# Patient Record
Sex: MALE | Race: WHITE | Employment: OTHER | URBAN - METROPOLITAN AREA
[De-identification: names, ages, dates, MRNs, and addresses within clinical notes are randomized per-mention and may not be internally consistent; named-entity substitution may affect disease eponyms.]

---

## 2017-01-01 ENCOUNTER — ANESTHESIA (OUTPATIENT)
Dept: ENDOSCOPY | Facility: OTHER | Age: 77
DRG: 064 | End: 2017-01-01
Payer: MEDICARE

## 2017-01-01 ENCOUNTER — SURGERY (OUTPATIENT)
Age: 77
End: 2017-01-01

## 2017-01-01 ENCOUNTER — ANESTHESIA EVENT (OUTPATIENT)
Dept: ENDOSCOPY | Facility: OTHER | Age: 77
DRG: 064 | End: 2017-01-01
Payer: MEDICARE

## 2017-01-01 ENCOUNTER — HOSPITAL ENCOUNTER (INPATIENT)
Facility: OTHER | Age: 77
LOS: 11 days | Discharge: NURSING, REHAB OR LONG-TERM CARE | DRG: 064 | End: 2017-02-24
Attending: INTERNAL MEDICINE | Admitting: INTERNAL MEDICINE
Payer: MEDICARE

## 2017-01-01 ENCOUNTER — TELEPHONE (OUTPATIENT)
Dept: NEUROLOGY | Facility: CLINIC | Age: 77
End: 2017-01-01

## 2017-01-01 ENCOUNTER — PATIENT OUTREACH (OUTPATIENT)
Dept: ADMINISTRATIVE | Facility: CLINIC | Age: 77
End: 2017-01-01
Payer: MEDICARE

## 2017-01-01 ENCOUNTER — TELEMEDICINE (OUTPATIENT)
Dept: TELEMEDICINE | Facility: OTHER | Age: 77
End: 2017-01-01
Payer: MEDICARE

## 2017-01-01 VITALS
TEMPERATURE: 98 F | WEIGHT: 169.56 LBS | HEIGHT: 70 IN | SYSTOLIC BLOOD PRESSURE: 148 MMHG | HEART RATE: 73 BPM | BODY MASS INDEX: 24.27 KG/M2 | RESPIRATION RATE: 20 BRPM | DIASTOLIC BLOOD PRESSURE: 70 MMHG | OXYGEN SATURATION: 91 %

## 2017-01-01 DIAGNOSIS — R13.11 ORAL PHASE DYSPHAGIA: ICD-10-CM

## 2017-01-01 DIAGNOSIS — I10 ESSENTIAL HYPERTENSION: ICD-10-CM

## 2017-01-01 DIAGNOSIS — I63.412 EMBOLIC STROKE INVOLVING LEFT MIDDLE CEREBRAL ARTERY: ICD-10-CM

## 2017-01-01 DIAGNOSIS — R47.01 APHASIA: ICD-10-CM

## 2017-01-01 DIAGNOSIS — R45.1 AGITATION: ICD-10-CM

## 2017-01-01 DIAGNOSIS — R06.03 RESPIRATORY DISTRESS: Primary | ICD-10-CM

## 2017-01-01 DIAGNOSIS — G81.91 RIGHT HEMIPLEGIA: ICD-10-CM

## 2017-01-01 DIAGNOSIS — I63.512 ACUTE ISCHEMIC LEFT MCA STROKE: ICD-10-CM

## 2017-01-01 DIAGNOSIS — I63.9 CVA (CEREBRAL VASCULAR ACCIDENT): ICD-10-CM

## 2017-01-01 LAB
25(OH)D3+25(OH)D2 SERPL-MCNC: 16 NG/ML
ALBUMIN SERPL BCP-MCNC: 3.2 G/DL
ALBUMIN SERPL BCP-MCNC: 3.6 G/DL
ALBUMIN SERPL ELPH-MCNC: 2.64 G/DL
ALP SERPL-CCNC: 123 U/L
ALP SERPL-CCNC: 135 U/L
ALPHA1 GLOB SERPL ELPH-MCNC: 0.53 G/DL
ALPHA2 GLOB SERPL ELPH-MCNC: 1.51 G/DL
ALT SERPL W/O P-5'-P-CCNC: 7 U/L
ALT SERPL W/O P-5'-P-CCNC: 8 U/L
ANA SER QL IF: NORMAL
ANION GAP SERPL CALC-SCNC: 10 MMOL/L
ANION GAP SERPL CALC-SCNC: 11 MMOL/L
ANION GAP SERPL CALC-SCNC: 12 MMOL/L
ANION GAP SERPL CALC-SCNC: 13 MMOL/L
ANION GAP SERPL CALC-SCNC: 13 MMOL/L
ANION GAP SERPL CALC-SCNC: 14 MMOL/L
ANION GAP SERPL CALC-SCNC: 21 MMOL/L
ANISOCYTOSIS BLD QL SMEAR: SLIGHT
AST SERPL-CCNC: 8 U/L
AST SERPL-CCNC: 8 U/L
B-GLOBULIN SERPL ELPH-MCNC: 0.85 G/DL
BACTERIA #/AREA URNS HPF: ABNORMAL /HPF
BACTERIA UR CULT: NO GROWTH
BASO STIPL BLD QL SMEAR: ABNORMAL
BASOPHILS # BLD AUTO: 0.02 K/UL
BASOPHILS # BLD AUTO: 0.03 K/UL
BASOPHILS # BLD AUTO: 0.05 K/UL
BASOPHILS # BLD AUTO: ABNORMAL K/UL
BASOPHILS NFR BLD: 0.1 %
BASOPHILS NFR BLD: 0.1 %
BASOPHILS NFR BLD: 0.2 %
BASOPHILS NFR BLD: 0.3 %
BASOPHILS NFR BLD: 0.5 %
BASOPHILS NFR BLD: 1 %
BILIRUB SERPL-MCNC: 0.4 MG/DL
BILIRUB SERPL-MCNC: 0.5 MG/DL
BILIRUB UR QL STRIP: NEGATIVE
BUN SERPL-MCNC: 102 MG/DL
BUN SERPL-MCNC: 102 MG/DL
BUN SERPL-MCNC: 112 MG/DL
BUN SERPL-MCNC: 25 MG/DL
BUN SERPL-MCNC: 27 MG/DL
BUN SERPL-MCNC: 31 MG/DL
BUN SERPL-MCNC: 37 MG/DL
BUN SERPL-MCNC: 53 MG/DL
BUN SERPL-MCNC: 71 MG/DL
BUN SERPL-MCNC: 74 MG/DL
BUN SERPL-MCNC: 81 MG/DL
BUN SERPL-MCNC: 99 MG/DL
CALCIUM SERPL-MCNC: 8.3 MG/DL
CALCIUM SERPL-MCNC: 8.3 MG/DL
CALCIUM SERPL-MCNC: 8.4 MG/DL
CALCIUM SERPL-MCNC: 8.4 MG/DL
CALCIUM SERPL-MCNC: 8.5 MG/DL
CALCIUM SERPL-MCNC: 8.7 MG/DL
CALCIUM SERPL-MCNC: 8.8 MG/DL
CALCIUM SERPL-MCNC: 8.9 MG/DL
CALCIUM SERPL-MCNC: 9.3 MG/DL
CHLORIDE SERPL-SCNC: 102 MMOL/L
CHLORIDE SERPL-SCNC: 106 MMOL/L
CHLORIDE SERPL-SCNC: 107 MMOL/L
CHLORIDE SERPL-SCNC: 110 MMOL/L
CHLORIDE SERPL-SCNC: 111 MMOL/L
CHLORIDE SERPL-SCNC: 112 MMOL/L
CHLORIDE SERPL-SCNC: 114 MMOL/L
CHLORIDE SERPL-SCNC: 117 MMOL/L
CHOLEST/HDLC SERPL: 8.4 {RATIO}
CLARITY UR: CLEAR
CO2 SERPL-SCNC: 16 MMOL/L
CO2 SERPL-SCNC: 16 MMOL/L
CO2 SERPL-SCNC: 18 MMOL/L
CO2 SERPL-SCNC: 21 MMOL/L
CO2 SERPL-SCNC: 21 MMOL/L
CO2 SERPL-SCNC: 22 MMOL/L
CO2 SERPL-SCNC: 24 MMOL/L
CO2 SERPL-SCNC: 24 MMOL/L
CO2 SERPL-SCNC: 9 MMOL/L
COLOR UR: YELLOW
CREAT SERPL-MCNC: 2 MG/DL
CREAT SERPL-MCNC: 2.2 MG/DL
CREAT SERPL-MCNC: 2.2 MG/DL
CREAT SERPL-MCNC: 2.4 MG/DL
CREAT SERPL-MCNC: 3.1 MG/DL
CREAT SERPL-MCNC: 3.5 MG/DL
CREAT SERPL-MCNC: 3.6 MG/DL
CREAT SERPL-MCNC: 3.6 MG/DL
CREAT SERPL-MCNC: 4 MG/DL
CREAT SERPL-MCNC: 4.3 MG/DL
CREAT SERPL-MCNC: 4.3 MG/DL
CREAT SERPL-MCNC: 4.5 MG/DL
CREAT UR-MCNC: 28 MG/DL
CREAT UR-MCNC: 28 MG/DL
DIASTOLIC DYSFUNCTION: YES
DIFFERENTIAL METHOD: ABNORMAL
DOHLE BOD BLD QL SMEAR: PRESENT
EOSINOPHIL # BLD AUTO: 0.1 K/UL
EOSINOPHIL # BLD AUTO: 0.2 K/UL
EOSINOPHIL # BLD AUTO: 0.4 K/UL
EOSINOPHIL # BLD AUTO: 0.4 K/UL
EOSINOPHIL # BLD AUTO: 0.6 K/UL
EOSINOPHIL # BLD AUTO: 0.7 K/UL
EOSINOPHIL # BLD AUTO: 1 K/UL
EOSINOPHIL # BLD AUTO: 1.1 K/UL
EOSINOPHIL # BLD AUTO: ABNORMAL K/UL
EOSINOPHIL NFR BLD: 0.7 %
EOSINOPHIL NFR BLD: 0.8 %
EOSINOPHIL NFR BLD: 1.1 %
EOSINOPHIL NFR BLD: 1.2 %
EOSINOPHIL NFR BLD: 2.2 %
EOSINOPHIL NFR BLD: 3 %
EOSINOPHIL NFR BLD: 3 %
EOSINOPHIL NFR BLD: 3.1 %
EOSINOPHIL NFR BLD: 3.8 %
EOSINOPHIL NFR BLD: 5.4 %
EOSINOPHIL NFR BLD: 7.4 %
EOSINOPHIL NFR BLD: 9.5 %
EOSINOPHIL URNS QL WRIGHT STN: NORMAL
ERYTHROCYTE [DISTWIDTH] IN BLOOD BY AUTOMATED COUNT: 14.6 %
ERYTHROCYTE [DISTWIDTH] IN BLOOD BY AUTOMATED COUNT: 14.7 %
ERYTHROCYTE [DISTWIDTH] IN BLOOD BY AUTOMATED COUNT: 14.8 %
ERYTHROCYTE [DISTWIDTH] IN BLOOD BY AUTOMATED COUNT: 15 %
ERYTHROCYTE [DISTWIDTH] IN BLOOD BY AUTOMATED COUNT: 15 %
ERYTHROCYTE [DISTWIDTH] IN BLOOD BY AUTOMATED COUNT: 15.2 %
ERYTHROCYTE [DISTWIDTH] IN BLOOD BY AUTOMATED COUNT: 15.6 %
ERYTHROCYTE [DISTWIDTH] IN BLOOD BY AUTOMATED COUNT: 15.7 %
ERYTHROCYTE [DISTWIDTH] IN BLOOD BY AUTOMATED COUNT: 15.8 %
ERYTHROCYTE [DISTWIDTH] IN BLOOD BY AUTOMATED COUNT: 16.8 %
EST. GFR  (AFRICAN AMERICAN): 13 ML/MIN/1.73 M^2
EST. GFR  (AFRICAN AMERICAN): 14 ML/MIN/1.73 M^2
EST. GFR  (AFRICAN AMERICAN): 14 ML/MIN/1.73 M^2
EST. GFR  (AFRICAN AMERICAN): 15 ML/MIN/1.73 M^2
EST. GFR  (AFRICAN AMERICAN): 18 ML/MIN/1.73 M^2
EST. GFR  (AFRICAN AMERICAN): 21 ML/MIN/1.73 M^2
EST. GFR  (AFRICAN AMERICAN): 29 ML/MIN/1.73 M^2
EST. GFR  (AFRICAN AMERICAN): 32 ML/MIN/1.73 M^2
EST. GFR  (AFRICAN AMERICAN): 32 ML/MIN/1.73 M^2
EST. GFR  (AFRICAN AMERICAN): 36 ML/MIN/1.73 M^2
EST. GFR  (NON AFRICAN AMERICAN): 12 ML/MIN/1.73 M^2
EST. GFR  (NON AFRICAN AMERICAN): 13 ML/MIN/1.73 M^2
EST. GFR  (NON AFRICAN AMERICAN): 15 ML/MIN/1.73 M^2
EST. GFR  (NON AFRICAN AMERICAN): 15 ML/MIN/1.73 M^2
EST. GFR  (NON AFRICAN AMERICAN): 16 ML/MIN/1.73 M^2
EST. GFR  (NON AFRICAN AMERICAN): 18 ML/MIN/1.73 M^2
EST. GFR  (NON AFRICAN AMERICAN): 25 ML/MIN/1.73 M^2
EST. GFR  (NON AFRICAN AMERICAN): 27 ML/MIN/1.73 M^2
EST. GFR  (NON AFRICAN AMERICAN): 27 ML/MIN/1.73 M^2
EST. GFR  (NON AFRICAN AMERICAN): 31 ML/MIN/1.73 M^2
ESTIMATED AVG GLUCOSE: 232 MG/DL
ESTIMATED PA SYSTOLIC PRESSURE: 32.16
FERRITIN SERPL-MCNC: 78 NG/ML
GAMMA GLOB SERPL ELPH-MCNC: 0.68 G/DL
GIANT PLATELETS BLD QL SMEAR: PRESENT
GLOBAL PERICARDIAL EFFUSION: ABNORMAL
GLUCOSE SERPL-MCNC: 121 MG/DL
GLUCOSE SERPL-MCNC: 131 MG/DL
GLUCOSE SERPL-MCNC: 137 MG/DL
GLUCOSE SERPL-MCNC: 200 MG/DL
GLUCOSE SERPL-MCNC: 203 MG/DL
GLUCOSE SERPL-MCNC: 215 MG/DL
GLUCOSE SERPL-MCNC: 222 MG/DL
GLUCOSE SERPL-MCNC: 230 MG/DL
GLUCOSE SERPL-MCNC: 236 MG/DL
GLUCOSE SERPL-MCNC: 257 MG/DL
GLUCOSE SERPL-MCNC: 271 MG/DL
GLUCOSE SERPL-MCNC: 59 MG/DL
GLUCOSE UR QL STRIP: ABNORMAL
HAV IGM SERPL QL IA: NEGATIVE
HBA1C MFR BLD HPLC: 9.7 %
HBV CORE IGM SERPL QL IA: NEGATIVE
HBV SURFACE AG SERPL QL IA: NEGATIVE
HCT VFR BLD AUTO: 30.2 %
HCT VFR BLD AUTO: 30.6 %
HCT VFR BLD AUTO: 32 %
HCT VFR BLD AUTO: 32.4 %
HCT VFR BLD AUTO: 33.2 %
HCT VFR BLD AUTO: 33.5 %
HCT VFR BLD AUTO: 33.8 %
HCT VFR BLD AUTO: 33.9 %
HCT VFR BLD AUTO: 34.8 %
HCT VFR BLD AUTO: 35 %
HCT VFR BLD AUTO: 35.9 %
HCT VFR BLD AUTO: 36.5 %
HCV AB SERPL QL IA: NEGATIVE
HDL/CHOLESTEROL RATIO: 11.9 %
HDLC SERPL-MCNC: 236 MG/DL
HDLC SERPL-MCNC: 28 MG/DL
HGB BLD-MCNC: 10.1 G/DL
HGB BLD-MCNC: 10.3 G/DL
HGB BLD-MCNC: 10.4 G/DL
HGB BLD-MCNC: 10.5 G/DL
HGB BLD-MCNC: 10.5 G/DL
HGB BLD-MCNC: 10.9 G/DL
HGB BLD-MCNC: 11 G/DL
HGB BLD-MCNC: 11 G/DL
HGB BLD-MCNC: 11.3 G/DL
HGB BLD-MCNC: 9.2 G/DL
HGB BLD-MCNC: 9.4 G/DL
HGB BLD-MCNC: 9.8 G/DL
HGB UR QL STRIP: ABNORMAL
HIV 1+2 AB+HIV1 P24 AG SERPL QL IA: NEGATIVE
HYALINE CASTS #/AREA URNS LPF: 0 /LPF
HYPOCHROMIA BLD QL SMEAR: ABNORMAL
INR PPP: 1
INTERPRETATION SERPL IFE-IMP: NORMAL
INTERPRETATION UR IFE-IMP: NORMAL
IRON SERPL-MCNC: 14 UG/DL
KETONES UR QL STRIP: NEGATIVE
LACTATE SERPL-SCNC: 1.4 MMOL/L
LDLC SERPL CALC-MCNC: 128.6 MG/DL
LEUKOCYTE ESTERASE UR QL STRIP: NEGATIVE
LYMPHOCYTES # BLD AUTO: 1.5 K/UL
LYMPHOCYTES # BLD AUTO: 1.9 K/UL
LYMPHOCYTES # BLD AUTO: 1.9 K/UL
LYMPHOCYTES # BLD AUTO: 2 K/UL
LYMPHOCYTES # BLD AUTO: 2.1 K/UL
LYMPHOCYTES # BLD AUTO: 2.2 K/UL
LYMPHOCYTES # BLD AUTO: 2.2 K/UL
LYMPHOCYTES # BLD AUTO: 2.3 K/UL
LYMPHOCYTES # BLD AUTO: 2.4 K/UL
LYMPHOCYTES # BLD AUTO: 2.6 K/UL
LYMPHOCYTES # BLD AUTO: ABNORMAL K/UL
LYMPHOCYTES NFR BLD: 11.4 %
LYMPHOCYTES NFR BLD: 12.6 %
LYMPHOCYTES NFR BLD: 14.4 %
LYMPHOCYTES NFR BLD: 15.3 %
LYMPHOCYTES NFR BLD: 15.3 %
LYMPHOCYTES NFR BLD: 16.2 %
LYMPHOCYTES NFR BLD: 16.9 %
LYMPHOCYTES NFR BLD: 16.9 %
LYMPHOCYTES NFR BLD: 17.3 %
LYMPHOCYTES NFR BLD: 17.3 %
LYMPHOCYTES NFR BLD: 26.1 %
LYMPHOCYTES NFR BLD: 9 %
MAGNESIUM SERPL-MCNC: 1.7 MG/DL
MCH RBC QN AUTO: 18.7 PG
MCH RBC QN AUTO: 18.8 PG
MCH RBC QN AUTO: 18.8 PG
MCH RBC QN AUTO: 18.9 PG
MCH RBC QN AUTO: 19 PG
MCH RBC QN AUTO: 19.1 PG
MCH RBC QN AUTO: 19.3 PG
MCH RBC QN AUTO: 19.3 PG
MCH RBC QN AUTO: 19.4 PG
MCHC RBC AUTO-ENTMCNC: 30.5 %
MCHC RBC AUTO-ENTMCNC: 30.6 %
MCHC RBC AUTO-ENTMCNC: 30.6 %
MCHC RBC AUTO-ENTMCNC: 30.7 %
MCHC RBC AUTO-ENTMCNC: 31 %
MCHC RBC AUTO-ENTMCNC: 31.1 %
MCHC RBC AUTO-ENTMCNC: 31.1 %
MCHC RBC AUTO-ENTMCNC: 31.2 %
MCHC RBC AUTO-ENTMCNC: 31.6 %
MCV RBC AUTO: 60 FL
MCV RBC AUTO: 60 FL
MCV RBC AUTO: 61 FL
MCV RBC AUTO: 62 FL
MCV RBC AUTO: 63 FL
MCV RBC AUTO: 63 FL
MICROSCOPIC COMMENT: ABNORMAL
MONOCYTES # BLD AUTO: 0.7 K/UL
MONOCYTES # BLD AUTO: 0.9 K/UL
MONOCYTES # BLD AUTO: 1 K/UL
MONOCYTES # BLD AUTO: 1.2 K/UL
MONOCYTES # BLD AUTO: 1.2 K/UL
MONOCYTES # BLD AUTO: 1.3 K/UL
MONOCYTES # BLD AUTO: 1.4 K/UL
MONOCYTES # BLD AUTO: 1.4 K/UL
MONOCYTES # BLD AUTO: 1.5 K/UL
MONOCYTES # BLD AUTO: 1.7 K/UL
MONOCYTES # BLD AUTO: ABNORMAL K/UL
MONOCYTES NFR BLD: 10.3 %
MONOCYTES NFR BLD: 10.9 %
MONOCYTES NFR BLD: 11.1 %
MONOCYTES NFR BLD: 12.4 %
MONOCYTES NFR BLD: 14.2 %
MONOCYTES NFR BLD: 15 %
MONOCYTES NFR BLD: 6.9 %
MONOCYTES NFR BLD: 7.1 %
MONOCYTES NFR BLD: 8 %
MONOCYTES NFR BLD: 8.2 %
MONOCYTES NFR BLD: 8.6 %
MONOCYTES NFR BLD: 9.6 %
NEUTROPHILS # BLD AUTO: 11.1 K/UL
NEUTROPHILS # BLD AUTO: 13.3 K/UL
NEUTROPHILS # BLD AUTO: 5.6 K/UL
NEUTROPHILS # BLD AUTO: 8.4 K/UL
NEUTROPHILS # BLD AUTO: 8.4 K/UL
NEUTROPHILS # BLD AUTO: 8.7 K/UL
NEUTROPHILS # BLD AUTO: 9 K/UL
NEUTROPHILS # BLD AUTO: 9.1 K/UL
NEUTROPHILS # BLD AUTO: 9.2 K/UL
NEUTROPHILS # BLD AUTO: 9.4 K/UL
NEUTROPHILS NFR BLD: 56.8 %
NEUTROPHILS NFR BLD: 64.8 %
NEUTROPHILS NFR BLD: 66.6 %
NEUTROPHILS NFR BLD: 68.2 %
NEUTROPHILS NFR BLD: 68.5 %
NEUTROPHILS NFR BLD: 68.8 %
NEUTROPHILS NFR BLD: 71 %
NEUTROPHILS NFR BLD: 71.4 %
NEUTROPHILS NFR BLD: 73.4 %
NEUTROPHILS NFR BLD: 75.2 %
NEUTROPHILS NFR BLD: 78.4 %
NEUTROPHILS NFR BLD: 80.3 %
NEUTS BAND NFR BLD MANUAL: 1 %
NITRITE UR QL STRIP: NEGATIVE
NONHDLC SERPL-MCNC: 208 MG/DL
NRBC BLD-RTO: 0 /100 WBC
NRBC BLD-RTO: 0 /100 WBC
OB PNL STL: NEGATIVE
OVALOCYTES BLD QL SMEAR: ABNORMAL
PATHOLOGIST INTERPRETATION IFE: NORMAL
PATHOLOGIST INTERPRETATION SPE: NORMAL
PATHOLOGIST INTERPRETATION UIFE: NORMAL
PATHOLOGIST INTERPRETATION UPE: NORMAL
PH UR STRIP: 5 [PH] (ref 5–8)
PHOSPHATE SERPL-MCNC: 2.8 MG/DL
PLATELET # BLD AUTO: 244 K/UL
PLATELET # BLD AUTO: 255 K/UL
PLATELET # BLD AUTO: 257 K/UL
PLATELET # BLD AUTO: 264 K/UL
PLATELET # BLD AUTO: 272 K/UL
PLATELET # BLD AUTO: 282 K/UL
PLATELET # BLD AUTO: 287 K/UL
PLATELET # BLD AUTO: 298 K/UL
PLATELET # BLD AUTO: 300 K/UL
PLATELET # BLD AUTO: 334 K/UL
PLATELET # BLD AUTO: 389 K/UL
PLATELET # BLD AUTO: 389 K/UL
PLATELET BLD QL SMEAR: ABNORMAL
PMV BLD AUTO: 11 FL
PMV BLD AUTO: 11.3 FL
PMV BLD AUTO: 11.6 FL
PMV BLD AUTO: ABNORMAL FL
POCT GLUCOSE: 109 MG/DL (ref 70–110)
POCT GLUCOSE: 117 MG/DL (ref 70–110)
POCT GLUCOSE: 120 MG/DL (ref 70–110)
POCT GLUCOSE: 121 MG/DL (ref 70–110)
POCT GLUCOSE: 133 MG/DL (ref 70–110)
POCT GLUCOSE: 136 MG/DL (ref 70–110)
POCT GLUCOSE: 141 MG/DL (ref 70–110)
POCT GLUCOSE: 152 MG/DL (ref 70–110)
POCT GLUCOSE: 166 MG/DL (ref 70–110)
POCT GLUCOSE: 166 MG/DL (ref 70–110)
POCT GLUCOSE: 171 MG/DL (ref 70–110)
POCT GLUCOSE: 173 MG/DL (ref 70–110)
POCT GLUCOSE: 181 MG/DL (ref 70–110)
POCT GLUCOSE: 182 MG/DL (ref 70–110)
POCT GLUCOSE: 182 MG/DL (ref 70–110)
POCT GLUCOSE: 187 MG/DL (ref 70–110)
POCT GLUCOSE: 194 MG/DL (ref 70–110)
POCT GLUCOSE: 203 MG/DL (ref 70–110)
POCT GLUCOSE: 203 MG/DL (ref 70–110)
POCT GLUCOSE: 204 MG/DL (ref 70–110)
POCT GLUCOSE: 207 MG/DL (ref 70–110)
POCT GLUCOSE: 219 MG/DL (ref 70–110)
POCT GLUCOSE: 219 MG/DL (ref 70–110)
POCT GLUCOSE: 220 MG/DL (ref 70–110)
POCT GLUCOSE: 221 MG/DL (ref 70–110)
POCT GLUCOSE: 223 MG/DL (ref 70–110)
POCT GLUCOSE: 227 MG/DL (ref 70–110)
POCT GLUCOSE: 229 MG/DL (ref 70–110)
POCT GLUCOSE: 232 MG/DL (ref 70–110)
POCT GLUCOSE: 239 MG/DL (ref 70–110)
POCT GLUCOSE: 240 MG/DL (ref 70–110)
POCT GLUCOSE: 240 MG/DL (ref 70–110)
POCT GLUCOSE: 241 MG/DL (ref 70–110)
POCT GLUCOSE: 246 MG/DL (ref 70–110)
POCT GLUCOSE: 250 MG/DL (ref 70–110)
POCT GLUCOSE: 252 MG/DL (ref 70–110)
POCT GLUCOSE: 259 MG/DL (ref 70–110)
POCT GLUCOSE: 262 MG/DL (ref 70–110)
POCT GLUCOSE: 271 MG/DL (ref 70–110)
POCT GLUCOSE: 274 MG/DL (ref 70–110)
POCT GLUCOSE: 274 MG/DL (ref 70–110)
POCT GLUCOSE: 282 MG/DL (ref 70–110)
POCT GLUCOSE: 292 MG/DL (ref 70–110)
POCT GLUCOSE: 302 MG/DL (ref 70–110)
POCT GLUCOSE: 315 MG/DL (ref 70–110)
POCT GLUCOSE: 339 MG/DL (ref 70–110)
POCT GLUCOSE: 342 MG/DL (ref 70–110)
POCT GLUCOSE: 355 MG/DL (ref 70–110)
POCT GLUCOSE: 361 MG/DL (ref 70–110)
POCT GLUCOSE: 366 MG/DL (ref 70–110)
POCT GLUCOSE: 444 MG/DL (ref 70–110)
POCT GLUCOSE: 67 MG/DL (ref 70–110)
POCT GLUCOSE: 94 MG/DL (ref 70–110)
POIKILOCYTOSIS BLD QL SMEAR: SLIGHT
POLYCHROMASIA BLD QL SMEAR: ABNORMAL
POTASSIUM SERPL-SCNC: 3.9 MMOL/L
POTASSIUM SERPL-SCNC: 4 MMOL/L
POTASSIUM SERPL-SCNC: 4.1 MMOL/L
POTASSIUM SERPL-SCNC: 4.1 MMOL/L
POTASSIUM SERPL-SCNC: 4.3 MMOL/L
POTASSIUM SERPL-SCNC: 4.4 MMOL/L
POTASSIUM SERPL-SCNC: 4.4 MMOL/L
POTASSIUM SERPL-SCNC: 4.5 MMOL/L
POTASSIUM SERPL-SCNC: 4.5 MMOL/L
POTASSIUM SERPL-SCNC: 4.8 MMOL/L
PROT 24H UR-MRATE: 570 MG/SPEC
PROT PATTERN UR ELPH-IMP: NORMAL
PROT SERPL-MCNC: 6.2 G/DL
PROT SERPL-MCNC: 6.3 G/DL
PROT SERPL-MCNC: 7 G/DL
PROT UR QL STRIP: ABNORMAL
PROT UR-MCNC: 40 MG/DL
PROT UR-MCNC: 95 MG/DL
PROT/CREAT RATIO, UR: 1.43
PROTHROMBIN TIME: 11.3 SEC
PTH-INTACT SERPL-MCNC: 198.1 PG/ML
RBC # BLD AUTO: 4.88 M/UL
RBC # BLD AUTO: 5.01 M/UL
RBC # BLD AUTO: 5.18 M/UL
RBC # BLD AUTO: 5.36 M/UL
RBC # BLD AUTO: 5.47 M/UL
RBC # BLD AUTO: 5.49 M/UL
RBC # BLD AUTO: 5.5 M/UL
RBC # BLD AUTO: 5.56 M/UL
RBC # BLD AUTO: 5.71 M/UL
RBC # BLD AUTO: 5.71 M/UL
RBC # BLD AUTO: 5.77 M/UL
RBC # BLD AUTO: 5.82 M/UL
RBC #/AREA URNS HPF: 75 /HPF (ref 0–4)
RETIRED EF AND QEF - SEE NOTES: 60 (ref 55–65)
RPR SER QL: NORMAL
SATURATED IRON: 5 %
SCHISTOCYTES BLD QL SMEAR: ABNORMAL
SODIUM SERPL-SCNC: 138 MMOL/L
SODIUM SERPL-SCNC: 140 MMOL/L
SODIUM SERPL-SCNC: 141 MMOL/L
SODIUM SERPL-SCNC: 141 MMOL/L
SODIUM SERPL-SCNC: 142 MMOL/L
SODIUM SERPL-SCNC: 147 MMOL/L
SODIUM SERPL-SCNC: 149 MMOL/L
SODIUM UR-SCNC: 94 MMOL/L
SP GR UR STRIP: 1.01 (ref 1–1.03)
T4 FREE SERPL-MCNC: 0.87 NG/DL
T4 FREE SERPL-MCNC: 0.92 NG/DL
TARGETS BLD QL SMEAR: ABNORMAL
TOTAL IRON BINDING CAPACITY: 258 UG/DL
TRANSFERRIN SERPL-MCNC: 174 MG/DL
TRICUSPID VALVE REGURGITATION: ABNORMAL
TRIGL SERPL-MCNC: 397 MG/DL
TSH SERPL DL<=0.005 MIU/L-ACNC: 4.49 UIU/ML
TSH SERPL DL<=0.005 MIU/L-ACNC: 5.01 UIU/ML
URATE SERPL-MCNC: 10.3 MG/DL
URINE COLLECTION DURATION: 24 HR
URINE VOLUME: 600 ML
URN SPEC COLLECT METH UR: ABNORMAL
UROBILINOGEN UR STRIP-ACNC: NEGATIVE EU/DL
WBC # BLD AUTO: 11.78 K/UL
WBC # BLD AUTO: 11.8 K/UL
WBC # BLD AUTO: 11.82 K/UL
WBC # BLD AUTO: 12.12 K/UL
WBC # BLD AUTO: 12.35 K/UL
WBC # BLD AUTO: 12.82 K/UL
WBC # BLD AUTO: 13.22 K/UL
WBC # BLD AUTO: 13.62 K/UL
WBC # BLD AUTO: 14.27 K/UL
WBC # BLD AUTO: 15.31 K/UL
WBC # BLD AUTO: 16.63 K/UL
WBC # BLD AUTO: 9.96 K/UL
WBC #/AREA URNS HPF: 1 /HPF (ref 0–5)
WBC NRBC COR # BLD: 11.82 K/UL

## 2017-01-01 PROCEDURE — 25000242 PHARM REV CODE 250 ALT 637 W/ HCPCS: Performed by: FAMILY MEDICINE

## 2017-01-01 PROCEDURE — 63600175 PHARM REV CODE 636 W HCPCS: Performed by: INTERNAL MEDICINE

## 2017-01-01 PROCEDURE — 97112 NEUROMUSCULAR REEDUCATION: CPT

## 2017-01-01 PROCEDURE — 27000221 HC OXYGEN, UP TO 24 HOURS

## 2017-01-01 PROCEDURE — 25000003 PHARM REV CODE 250: Performed by: INTERNAL MEDICINE

## 2017-01-01 PROCEDURE — 97530 THERAPEUTIC ACTIVITIES: CPT

## 2017-01-01 PROCEDURE — 84550 ASSAY OF BLOOD/URIC ACID: CPT

## 2017-01-01 PROCEDURE — 87086 URINE CULTURE/COLONY COUNT: CPT

## 2017-01-01 PROCEDURE — 25000003 PHARM REV CODE 250: Performed by: HOSPITALIST

## 2017-01-01 PROCEDURE — 94640 AIRWAY INHALATION TREATMENT: CPT

## 2017-01-01 PROCEDURE — 84443 ASSAY THYROID STIM HORMONE: CPT

## 2017-01-01 PROCEDURE — 90471 IMMUNIZATION ADMIN: CPT | Performed by: HOSPITALIST

## 2017-01-01 PROCEDURE — 27000190 HC CPAP FULL FACE MASK W/VALVE

## 2017-01-01 PROCEDURE — 86334 IMMUNOFIX E-PHORESIS SERUM: CPT | Mod: 26,,, | Performed by: PATHOLOGY

## 2017-01-01 PROCEDURE — 11000001 HC ACUTE MED/SURG PRIVATE ROOM

## 2017-01-01 PROCEDURE — 86703 HIV-1/HIV-2 1 RESULT ANTBDY: CPT

## 2017-01-01 PROCEDURE — G0427 INPT/ED TELECONSULT70: HCPCS | Mod: GT,,, | Performed by: PSYCHIATRY & NEUROLOGY

## 2017-01-01 PROCEDURE — 93005 ELECTROCARDIOGRAM TRACING: CPT

## 2017-01-01 PROCEDURE — 43246 EGD PLACE GASTROSTOMY TUBE: CPT | Performed by: INTERNAL MEDICINE

## 2017-01-01 PROCEDURE — 99900035 HC TECH TIME PER 15 MIN (STAT)

## 2017-01-01 PROCEDURE — 97110 THERAPEUTIC EXERCISES: CPT

## 2017-01-01 PROCEDURE — 92526 ORAL FUNCTION THERAPY: CPT

## 2017-01-01 PROCEDURE — 93017 CV STRESS TEST TRACING ONLY: CPT

## 2017-01-01 PROCEDURE — 27201018 HC KIT, PEG (ANY): Performed by: INTERNAL MEDICINE

## 2017-01-01 PROCEDURE — 85025 COMPLETE CBC W/AUTO DIFF WBC: CPT

## 2017-01-01 PROCEDURE — G0008 ADMIN INFLUENZA VIRUS VAC: HCPCS | Performed by: HOSPITALIST

## 2017-01-01 PROCEDURE — 80048 BASIC METABOLIC PNL TOTAL CA: CPT

## 2017-01-01 PROCEDURE — 94660 CPAP INITIATION&MGMT: CPT

## 2017-01-01 PROCEDURE — 36415 COLL VENOUS BLD VENIPUNCTURE: CPT

## 2017-01-01 PROCEDURE — 99233 SBSQ HOSP IP/OBS HIGH 50: CPT | Mod: ,,, | Performed by: PSYCHIATRY & NEUROLOGY

## 2017-01-01 PROCEDURE — 83540 ASSAY OF IRON: CPT

## 2017-01-01 PROCEDURE — 97535 SELF CARE MNGMENT TRAINING: CPT

## 2017-01-01 PROCEDURE — 93010 ELECTROCARDIOGRAM REPORT: CPT | Mod: ,,, | Performed by: INTERNAL MEDICINE

## 2017-01-01 PROCEDURE — 85007 BL SMEAR W/DIFF WBC COUNT: CPT

## 2017-01-01 PROCEDURE — 99233 SBSQ HOSP IP/OBS HIGH 50: CPT | Mod: ,,, | Performed by: HOSPITALIST

## 2017-01-01 PROCEDURE — 99223 1ST HOSP IP/OBS HIGH 75: CPT | Mod: ,,, | Performed by: PSYCHIATRY & NEUROLOGY

## 2017-01-01 PROCEDURE — 84165 PROTEIN E-PHORESIS SERUM: CPT

## 2017-01-01 PROCEDURE — 3E0234Z INTRODUCTION OF SERUM, TOXOID AND VACCINE INTO MUSCLE, PERCUTANEOUS APPROACH: ICD-10-PCS | Performed by: INTERNAL MEDICINE

## 2017-01-01 PROCEDURE — 92507 TX SP LANG VOICE COMM INDIV: CPT

## 2017-01-01 PROCEDURE — 37000009 HC ANESTHESIA EA ADD 15 MINS: Performed by: INTERNAL MEDICINE

## 2017-01-01 PROCEDURE — 63600175 PHARM REV CODE 636 W HCPCS: Performed by: HOSPITALIST

## 2017-01-01 PROCEDURE — 84166 PROTEIN E-PHORESIS/URINE/CSF: CPT | Mod: 26,,, | Performed by: PATHOLOGY

## 2017-01-01 PROCEDURE — 20000000 HC ICU ROOM

## 2017-01-01 PROCEDURE — 84156 ASSAY OF PROTEIN URINE: CPT

## 2017-01-01 PROCEDURE — 25000003 PHARM REV CODE 250: Performed by: NURSE PRACTITIONER

## 2017-01-01 PROCEDURE — 82728 ASSAY OF FERRITIN: CPT

## 2017-01-01 PROCEDURE — 97802 MEDICAL NUTRITION INDIV IN: CPT

## 2017-01-01 PROCEDURE — 87205 SMEAR GRAM STAIN: CPT

## 2017-01-01 PROCEDURE — 37000008 HC ANESTHESIA 1ST 15 MINUTES: Performed by: INTERNAL MEDICINE

## 2017-01-01 PROCEDURE — 80053 COMPREHEN METABOLIC PANEL: CPT

## 2017-01-01 PROCEDURE — 83605 ASSAY OF LACTIC ACID: CPT

## 2017-01-01 PROCEDURE — 80074 ACUTE HEPATITIS PANEL: CPT

## 2017-01-01 PROCEDURE — 0DH63UZ INSERTION OF FEEDING DEVICE INTO STOMACH, PERCUTANEOUS APPROACH: ICD-10-PCS | Performed by: INTERNAL MEDICINE

## 2017-01-01 PROCEDURE — 93306 TTE W/DOPPLER COMPLETE: CPT

## 2017-01-01 PROCEDURE — 63600175 PHARM REV CODE 636 W HCPCS: Performed by: FAMILY MEDICINE

## 2017-01-01 PROCEDURE — 86335 IMMUNFIX E-PHORSIS/URINE/CSF: CPT

## 2017-01-01 PROCEDURE — 97803 MED NUTRITION INDIV SUBSEQ: CPT

## 2017-01-01 PROCEDURE — 92523 SPEECH SOUND LANG COMPREHEN: CPT

## 2017-01-01 PROCEDURE — 31720 CLEARANCE OF AIRWAYS: CPT

## 2017-01-01 PROCEDURE — 86334 IMMUNOFIX E-PHORESIS SERUM: CPT

## 2017-01-01 PROCEDURE — 63600175 PHARM REV CODE 636 W HCPCS: Performed by: NURSE ANESTHETIST, CERTIFIED REGISTERED

## 2017-01-01 PROCEDURE — 83735 ASSAY OF MAGNESIUM: CPT

## 2017-01-01 PROCEDURE — 85610 PROTHROMBIN TIME: CPT

## 2017-01-01 PROCEDURE — 97532 *HC OT COG SKL DEV EA 15: CPT

## 2017-01-01 PROCEDURE — 63600175 PHARM REV CODE 636 W HCPCS: Performed by: ANESTHESIOLOGY

## 2017-01-01 PROCEDURE — 85027 COMPLETE CBC AUTOMATED: CPT

## 2017-01-01 PROCEDURE — 99232 SBSQ HOSP IP/OBS MODERATE 35: CPT | Mod: ,,, | Performed by: INTERNAL MEDICINE

## 2017-01-01 PROCEDURE — 97162 PT EVAL MOD COMPLEX 30 MIN: CPT

## 2017-01-01 PROCEDURE — 84166 PROTEIN E-PHORESIS/URINE/CSF: CPT

## 2017-01-01 PROCEDURE — 97166 OT EVAL MOD COMPLEX 45 MIN: CPT

## 2017-01-01 PROCEDURE — 86335 IMMUNFIX E-PHORSIS/URINE/CSF: CPT | Mod: 26,,, | Performed by: PATHOLOGY

## 2017-01-01 PROCEDURE — 99233 SBSQ HOSP IP/OBS HIGH 50: CPT | Mod: ,,, | Performed by: INTERNAL MEDICINE

## 2017-01-01 PROCEDURE — 82272 OCCULT BLD FECES 1-3 TESTS: CPT

## 2017-01-01 PROCEDURE — 84165 PROTEIN E-PHORESIS SERUM: CPT | Mod: 26,,, | Performed by: PATHOLOGY

## 2017-01-01 PROCEDURE — 99223 1ST HOSP IP/OBS HIGH 75: CPT | Mod: ,,, | Performed by: INTERNAL MEDICINE

## 2017-01-01 PROCEDURE — 84300 ASSAY OF URINE SODIUM: CPT

## 2017-01-01 PROCEDURE — 81000 URINALYSIS NONAUTO W/SCOPE: CPT

## 2017-01-01 PROCEDURE — 90662 IIV NO PRSV INCREASED AG IM: CPT | Performed by: HOSPITALIST

## 2017-01-01 PROCEDURE — 25000003 PHARM REV CODE 250: Performed by: NURSE ANESTHETIST, CERTIFIED REGISTERED

## 2017-01-01 PROCEDURE — 82306 VITAMIN D 25 HYDROXY: CPT

## 2017-01-01 PROCEDURE — 86592 SYPHILIS TEST NON-TREP QUAL: CPT

## 2017-01-01 PROCEDURE — 25000242 PHARM REV CODE 250 ALT 637 W/ HCPCS: Performed by: HOSPITALIST

## 2017-01-01 PROCEDURE — 84439 ASSAY OF FREE THYROXINE: CPT

## 2017-01-01 PROCEDURE — 92610 EVALUATE SWALLOWING FUNCTION: CPT

## 2017-01-01 PROCEDURE — 0DJ08ZZ INSPECTION OF UPPER INTESTINAL TRACT, VIA NATURAL OR ARTIFICIAL OPENING ENDOSCOPIC: ICD-10-PCS | Performed by: INTERNAL MEDICINE

## 2017-01-01 PROCEDURE — 86038 ANTINUCLEAR ANTIBODIES: CPT

## 2017-01-01 PROCEDURE — 83036 HEMOGLOBIN GLYCOSYLATED A1C: CPT

## 2017-01-01 PROCEDURE — 84100 ASSAY OF PHOSPHORUS: CPT

## 2017-01-01 PROCEDURE — 80061 LIPID PANEL: CPT

## 2017-01-01 PROCEDURE — 83970 ASSAY OF PARATHORMONE: CPT

## 2017-01-01 RX ORDER — FUROSEMIDE 10 MG/ML
40 INJECTION INTRAMUSCULAR; INTRAVENOUS DAILY
Status: DISCONTINUED | OUTPATIENT
Start: 2017-01-01 | End: 2017-01-01

## 2017-01-01 RX ORDER — ALLOPURINOL 100 MG/1
200 TABLET ORAL DAILY
Status: DISCONTINUED | OUTPATIENT
Start: 2017-01-01 | End: 2017-01-01

## 2017-01-01 RX ORDER — FENTANYL CITRATE 50 UG/ML
25 INJECTION, SOLUTION INTRAMUSCULAR; INTRAVENOUS EVERY 5 MIN PRN
Status: DISCONTINUED | OUTPATIENT
Start: 2017-01-01 | End: 2017-01-01 | Stop reason: HOSPADM

## 2017-01-01 RX ORDER — AMIODARONE HYDROCHLORIDE 200 MG/1
200 TABLET ORAL
Status: DISCONTINUED | OUTPATIENT
Start: 2017-01-01 | End: 2017-01-01 | Stop reason: HOSPADM

## 2017-01-01 RX ORDER — AMIODARONE HYDROCHLORIDE 200 MG/1
200 TABLET ORAL DAILY
Qty: 60 TABLET | Refills: 1 | Status: SHIPPED | OUTPATIENT
Start: 2017-01-01 | End: 2018-02-24

## 2017-01-01 RX ORDER — INSULIN ASPART 100 [IU]/ML
1-10 INJECTION, SOLUTION INTRAVENOUS; SUBCUTANEOUS EVERY 6 HOURS PRN
Status: DISCONTINUED | OUTPATIENT
Start: 2017-01-01 | End: 2017-01-01 | Stop reason: HOSPADM

## 2017-01-01 RX ORDER — ONDANSETRON 2 MG/ML
4 INJECTION INTRAMUSCULAR; INTRAVENOUS ONCE AS NEEDED
Status: ACTIVE | OUTPATIENT
Start: 2017-01-01 | End: 2017-01-01

## 2017-01-01 RX ORDER — METOPROLOL TARTRATE 25 MG/1
25 TABLET, FILM COATED ORAL 2 TIMES DAILY
Status: DISCONTINUED | OUTPATIENT
Start: 2017-01-01 | End: 2017-01-01

## 2017-01-01 RX ORDER — METOPROLOL TARTRATE 50 MG/1
50 TABLET ORAL 2 TIMES DAILY
Status: DISCONTINUED | OUTPATIENT
Start: 2017-01-01 | End: 2017-01-01 | Stop reason: HOSPADM

## 2017-01-01 RX ORDER — HYDRALAZINE HYDROCHLORIDE 25 MG/1
25 TABLET, FILM COATED ORAL EVERY 8 HOURS
Status: DISCONTINUED | OUTPATIENT
Start: 2017-01-01 | End: 2017-01-01

## 2017-01-01 RX ORDER — ATORVASTATIN CALCIUM 20 MG/1
40 TABLET, FILM COATED ORAL DAILY
Status: DISCONTINUED | OUTPATIENT
Start: 2017-01-01 | End: 2017-01-01 | Stop reason: HOSPADM

## 2017-01-01 RX ORDER — CHOLECALCIFEROL (VITAMIN D3) 25 MCG
2000 TABLET ORAL DAILY
Status: DISCONTINUED | OUTPATIENT
Start: 2017-01-01 | End: 2017-01-01 | Stop reason: HOSPADM

## 2017-01-01 RX ORDER — HEPARIN SODIUM 5000 [USP'U]/ML
5000 INJECTION, SOLUTION INTRAVENOUS; SUBCUTANEOUS EVERY 8 HOURS
Status: DISCONTINUED | OUTPATIENT
Start: 2017-01-01 | End: 2017-01-01

## 2017-01-01 RX ORDER — HYDRALAZINE HYDROCHLORIDE 20 MG/ML
20 INJECTION INTRAMUSCULAR; INTRAVENOUS ONCE
Status: COMPLETED | OUTPATIENT
Start: 2017-01-01 | End: 2017-01-01

## 2017-01-01 RX ORDER — ASPIRIN 81 MG/1
81 TABLET ORAL DAILY
Refills: 0 | COMMUNITY
Start: 2017-01-01 | End: 2018-02-24

## 2017-01-01 RX ORDER — NIFEDIPINE 60 MG/1
60 TABLET, EXTENDED RELEASE ORAL DAILY
Qty: 30 TABLET | Refills: 0 | Status: SHIPPED | OUTPATIENT
Start: 2017-01-01 | End: 2018-02-24

## 2017-01-01 RX ORDER — SODIUM CHLORIDE 0.9 % (FLUSH) 0.9 %
3 SYRINGE (ML) INJECTION
Status: DISCONTINUED | OUTPATIENT
Start: 2017-01-01 | End: 2017-01-01 | Stop reason: HOSPADM

## 2017-01-01 RX ORDER — SIMVASTATIN 40 MG/1
40 TABLET, FILM COATED ORAL NIGHTLY
Status: ON HOLD | COMMUNITY
End: 2017-01-01 | Stop reason: HOSPADM

## 2017-01-01 RX ORDER — SODIUM CHLORIDE 9 MG/ML
INJECTION, SOLUTION INTRAVENOUS CONTINUOUS
Status: DISCONTINUED | OUTPATIENT
Start: 2017-01-01 | End: 2017-01-01

## 2017-01-01 RX ORDER — SODIUM CHLORIDE, SODIUM LACTATE, POTASSIUM CHLORIDE, CALCIUM CHLORIDE 600; 310; 30; 20 MG/100ML; MG/100ML; MG/100ML; MG/100ML
INJECTION, SOLUTION INTRAVENOUS CONTINUOUS PRN
Status: DISCONTINUED | OUTPATIENT
Start: 2017-01-01 | End: 2017-01-01

## 2017-01-01 RX ORDER — SODIUM CHLORIDE 450 MG/100ML
INJECTION, SOLUTION INTRAVENOUS CONTINUOUS
Status: DISCONTINUED | OUTPATIENT
Start: 2017-01-01 | End: 2017-01-01

## 2017-01-01 RX ORDER — GLUCAGON 1 MG
1 KIT INJECTION
Status: DISCONTINUED | OUTPATIENT
Start: 2017-01-01 | End: 2017-01-01 | Stop reason: HOSPADM

## 2017-01-01 RX ORDER — GABAPENTIN 300 MG/1
300 CAPSULE ORAL 3 TIMES DAILY
COMMUNITY

## 2017-01-01 RX ORDER — METOPROLOL TARTRATE 50 MG/1
50 TABLET ORAL 2 TIMES DAILY
Qty: 60 TABLET | Refills: 1 | Status: SHIPPED | OUTPATIENT
Start: 2017-01-01 | End: 2018-02-24

## 2017-01-01 RX ORDER — METOPROLOL TARTRATE 50 MG/1
50 TABLET ORAL 3 TIMES DAILY
Status: DISCONTINUED | OUTPATIENT
Start: 2017-01-01 | End: 2017-01-01

## 2017-01-01 RX ORDER — ATORVASTATIN CALCIUM 40 MG/1
40 TABLET, FILM COATED ORAL DAILY
Qty: 30 TABLET | Refills: 1 | Status: SHIPPED | OUTPATIENT
Start: 2017-01-01 | End: 2018-02-24

## 2017-01-01 RX ORDER — IPRATROPIUM BROMIDE AND ALBUTEROL SULFATE 2.5; .5 MG/3ML; MG/3ML
3 SOLUTION RESPIRATORY (INHALATION) EVERY 6 HOURS
Status: DISCONTINUED | OUTPATIENT
Start: 2017-01-01 | End: 2017-01-01 | Stop reason: HOSPADM

## 2017-01-01 RX ORDER — OXYCODONE HYDROCHLORIDE 5 MG/1
5 TABLET ORAL
Status: DISCONTINUED | OUTPATIENT
Start: 2017-01-01 | End: 2017-01-01 | Stop reason: HOSPADM

## 2017-01-01 RX ORDER — CALCITRIOL 0.25 UG/1
0.25 CAPSULE ORAL DAILY
Status: DISCONTINUED | OUTPATIENT
Start: 2017-01-01 | End: 2017-01-01 | Stop reason: HOSPADM

## 2017-01-01 RX ORDER — ACETAMINOPHEN 650 MG/1
650 SUPPOSITORY RECTAL EVERY 6 HOURS PRN
Status: DISCONTINUED | OUTPATIENT
Start: 2017-01-01 | End: 2017-01-01 | Stop reason: HOSPADM

## 2017-01-01 RX ORDER — FEBUXOSTAT 40 MG/1
40 TABLET, FILM COATED ORAL DAILY
Status: DISCONTINUED | OUTPATIENT
Start: 2017-01-01 | End: 2017-01-01 | Stop reason: HOSPADM

## 2017-01-01 RX ORDER — HYDRALAZINE HYDROCHLORIDE 20 MG/ML
20 INJECTION INTRAMUSCULAR; INTRAVENOUS ONCE
Status: DISCONTINUED | OUTPATIENT
Start: 2017-01-01 | End: 2017-01-01

## 2017-01-01 RX ORDER — ASPIRIN 81 MG/1
81 TABLET ORAL DAILY
Status: DISCONTINUED | OUTPATIENT
Start: 2017-01-01 | End: 2017-01-01

## 2017-01-01 RX ORDER — INSULIN GLARGINE 100 [IU]/ML
25 INJECTION, SOLUTION SUBCUTANEOUS NIGHTLY
Qty: 7.5 ML | Refills: 11 | Status: SHIPPED | OUTPATIENT
Start: 2017-01-01 | End: 2018-02-24

## 2017-01-01 RX ORDER — FAMOTIDINE 10 MG/ML
20 INJECTION INTRAVENOUS 2 TIMES DAILY
Status: DISCONTINUED | OUTPATIENT
Start: 2017-01-01 | End: 2017-01-01 | Stop reason: HOSPADM

## 2017-01-01 RX ORDER — CLOPIDOGREL BISULFATE 75 MG/1
75 TABLET ORAL DAILY
Status: DISCONTINUED | OUTPATIENT
Start: 2017-01-01 | End: 2017-01-01

## 2017-01-01 RX ORDER — FUROSEMIDE 10 MG/ML
10 INJECTION INTRAMUSCULAR; INTRAVENOUS ONCE
Status: COMPLETED | OUTPATIENT
Start: 2017-01-01 | End: 2017-01-01

## 2017-01-01 RX ORDER — LISINOPRIL 20 MG/1
20 TABLET ORAL DAILY
Status: DISCONTINUED | OUTPATIENT
Start: 2017-01-01 | End: 2017-01-01

## 2017-01-01 RX ORDER — IPRATROPIUM BROMIDE AND ALBUTEROL SULFATE 2.5; .5 MG/3ML; MG/3ML
3 SOLUTION RESPIRATORY (INHALATION) ONCE
Status: COMPLETED | OUTPATIENT
Start: 2017-01-01 | End: 2017-01-01

## 2017-01-01 RX ORDER — ASPIRIN 81 MG/1
81 TABLET ORAL DAILY
Status: DISCONTINUED | OUTPATIENT
Start: 2017-01-01 | End: 2017-01-01 | Stop reason: HOSPADM

## 2017-01-01 RX ORDER — LORAZEPAM 2 MG/ML
0.5 INJECTION INTRAMUSCULAR ONCE
Status: COMPLETED | OUTPATIENT
Start: 2017-01-01 | End: 2017-01-01

## 2017-01-01 RX ORDER — MEPERIDINE HYDROCHLORIDE 50 MG/ML
12.5 INJECTION INTRAMUSCULAR; INTRAVENOUS; SUBCUTANEOUS ONCE AS NEEDED
Status: ACTIVE | OUTPATIENT
Start: 2017-01-01 | End: 2017-01-01

## 2017-01-01 RX ORDER — LABETALOL HYDROCHLORIDE 5 MG/ML
10 INJECTION, SOLUTION INTRAVENOUS EVERY 4 HOURS PRN
Status: DISCONTINUED | OUTPATIENT
Start: 2017-01-01 | End: 2017-01-01 | Stop reason: HOSPADM

## 2017-01-01 RX ORDER — ASPIRIN 300 MG/1
300 SUPPOSITORY RECTAL DAILY
Status: DISCONTINUED | OUTPATIENT
Start: 2017-01-01 | End: 2017-01-01

## 2017-01-01 RX ORDER — NIFEDIPINE 30 MG/1
60 TABLET, EXTENDED RELEASE ORAL DAILY
Status: DISCONTINUED | OUTPATIENT
Start: 2017-01-01 | End: 2017-01-01 | Stop reason: HOSPADM

## 2017-01-01 RX ORDER — HYDROMORPHONE HYDROCHLORIDE 2 MG/ML
0.4 INJECTION, SOLUTION INTRAMUSCULAR; INTRAVENOUS; SUBCUTANEOUS EVERY 5 MIN PRN
Status: DISCONTINUED | OUTPATIENT
Start: 2017-01-01 | End: 2017-01-01 | Stop reason: HOSPADM

## 2017-01-01 RX ORDER — LABETALOL HYDROCHLORIDE 5 MG/ML
10 INJECTION, SOLUTION INTRAVENOUS
Status: DISCONTINUED | OUTPATIENT
Start: 2017-01-01 | End: 2017-01-01

## 2017-01-01 RX ORDER — PROPOFOL 10 MG/ML
VIAL (ML) INTRAVENOUS
Status: DISCONTINUED | OUTPATIENT
Start: 2017-01-01 | End: 2017-01-01

## 2017-01-01 RX ORDER — CLOPIDOGREL BISULFATE 75 MG/1
75 TABLET ORAL DAILY
COMMUNITY

## 2017-01-01 RX ORDER — FERROUS SULFATE 325(65) MG
325 TABLET, DELAYED RELEASE (ENTERIC COATED) ORAL 2 TIMES DAILY
Status: DISCONTINUED | OUTPATIENT
Start: 2017-01-01 | End: 2017-01-01 | Stop reason: HOSPADM

## 2017-01-01 RX ORDER — GLIPIZIDE 10 MG/1
10 TABLET ORAL 2 TIMES DAILY WITH MEALS
Status: ON HOLD | COMMUNITY
End: 2017-01-01 | Stop reason: HOSPADM

## 2017-01-01 RX ORDER — ERGOCALCIFEROL 1.25 MG/1
50000 CAPSULE ORAL
COMMUNITY

## 2017-01-01 RX ORDER — LISINOPRIL 40 MG/1
40 TABLET ORAL DAILY
Status: ON HOLD | COMMUNITY
End: 2017-01-01 | Stop reason: HOSPADM

## 2017-01-01 RX ORDER — ALLOPURINOL 100 MG/1
200 TABLET ORAL DAILY
COMMUNITY

## 2017-01-01 RX ADMIN — FAMOTIDINE 20 MG: 10 INJECTION, SOLUTION INTRAVENOUS at 08:02

## 2017-01-01 RX ADMIN — HYDRALAZINE HYDROCHLORIDE 25 MG: 25 TABLET, FILM COATED ORAL at 05:02

## 2017-01-01 RX ADMIN — LABETALOL HYDROCHLORIDE 10 MG: 5 INJECTION, SOLUTION INTRAVENOUS at 02:02

## 2017-01-01 RX ADMIN — DEXTROSE MONOHYDRATE 12.5 G: 25 INJECTION, SOLUTION INTRAVENOUS at 05:02

## 2017-01-01 RX ADMIN — INSULIN ASPART 6 UNITS: 100 INJECTION, SOLUTION INTRAVENOUS; SUBCUTANEOUS at 10:02

## 2017-01-01 RX ADMIN — FAMOTIDINE 20 MG: 10 INJECTION, SOLUTION INTRAVENOUS at 09:02

## 2017-01-01 RX ADMIN — HEPARIN SODIUM 5000 UNITS: 5000 INJECTION, SOLUTION INTRAVENOUS; SUBCUTANEOUS at 09:02

## 2017-01-01 RX ADMIN — NIFEDIPINE 60 MG: 30 TABLET, FILM COATED, EXTENDED RELEASE ORAL at 11:02

## 2017-01-01 RX ADMIN — INSULIN ASPART 4 UNITS: 100 INJECTION, SOLUTION INTRAVENOUS; SUBCUTANEOUS at 12:02

## 2017-01-01 RX ADMIN — ASPIRIN 81 MG: 81 TABLET, COATED ORAL at 11:02

## 2017-01-01 RX ADMIN — LABETALOL HYDROCHLORIDE 10 MG: 5 INJECTION, SOLUTION INTRAVENOUS at 06:02

## 2017-01-01 RX ADMIN — ATORVASTATIN CALCIUM 40 MG: 20 TABLET, FILM COATED ORAL at 08:02

## 2017-01-01 RX ADMIN — IPRATROPIUM BROMIDE AND ALBUTEROL SULFATE 3 ML: .5; 3 SOLUTION RESPIRATORY (INHALATION) at 01:02

## 2017-01-01 RX ADMIN — LABETALOL HYDROCHLORIDE 10 MG: 5 INJECTION, SOLUTION INTRAVENOUS at 10:02

## 2017-01-01 RX ADMIN — HEPARIN SODIUM 5000 UNITS: 5000 INJECTION, SOLUTION INTRAVENOUS; SUBCUTANEOUS at 02:02

## 2017-01-01 RX ADMIN — SODIUM CHLORIDE, SODIUM LACTATE, POTASSIUM CHLORIDE, AND CALCIUM CHLORIDE: 600; 310; 30; 20 INJECTION, SOLUTION INTRAVENOUS at 07:02

## 2017-01-01 RX ADMIN — LABETALOL HYDROCHLORIDE 10 MG: 5 INJECTION, SOLUTION INTRAVENOUS at 11:02

## 2017-01-01 RX ADMIN — AMIODARONE HYDROCHLORIDE 200 MG: 200 TABLET ORAL at 09:02

## 2017-01-01 RX ADMIN — AMIODARONE HYDROCHLORIDE 200 MG: 200 TABLET ORAL at 05:02

## 2017-01-01 RX ADMIN — INSULIN ASPART 3 UNITS: 100 INJECTION, SOLUTION INTRAVENOUS; SUBCUTANEOUS at 12:02

## 2017-01-01 RX ADMIN — ALLOPURINOL 200 MG: 100 TABLET ORAL at 10:02

## 2017-01-01 RX ADMIN — AMIODARONE HYDROCHLORIDE 0.5 MG/MIN: 1.8 INJECTION, SOLUTION INTRAVENOUS at 05:02

## 2017-01-01 RX ADMIN — FAMOTIDINE 20 MG: 10 INJECTION, SOLUTION INTRAVENOUS at 10:02

## 2017-01-01 RX ADMIN — AMIODARONE HYDROCHLORIDE 0.5 MG/MIN: 1.8 INJECTION, SOLUTION INTRAVENOUS at 08:02

## 2017-01-01 RX ADMIN — ATORVASTATIN CALCIUM 40 MG: 20 TABLET, FILM COATED ORAL at 01:02

## 2017-01-01 RX ADMIN — METOPROLOL TARTRATE 25 MG: 25 TABLET, FILM COATED ORAL at 08:02

## 2017-01-01 RX ADMIN — IPRATROPIUM BROMIDE AND ALBUTEROL SULFATE 3 ML: .5; 3 SOLUTION RESPIRATORY (INHALATION) at 07:02

## 2017-01-01 RX ADMIN — HEPARIN SODIUM 5000 UNITS: 5000 INJECTION, SOLUTION INTRAVENOUS; SUBCUTANEOUS at 05:02

## 2017-01-01 RX ADMIN — INSULIN ASPART 1 UNITS: 100 INJECTION, SOLUTION INTRAVENOUS; SUBCUTANEOUS at 09:02

## 2017-01-01 RX ADMIN — METOPROLOL TARTRATE 50 MG: 50 TABLET ORAL at 05:02

## 2017-01-01 RX ADMIN — INSULIN ASPART 2 UNITS: 100 INJECTION, SOLUTION INTRAVENOUS; SUBCUTANEOUS at 01:02

## 2017-01-01 RX ADMIN — CLOPIDOGREL 75 MG: 75 TABLET, FILM COATED ORAL at 08:02

## 2017-01-01 RX ADMIN — NIFEDIPINE 60 MG: 30 TABLET, FILM COATED, EXTENDED RELEASE ORAL at 01:02

## 2017-01-01 RX ADMIN — LABETALOL HYDROCHLORIDE 10 MG: 5 INJECTION, SOLUTION INTRAVENOUS at 03:02

## 2017-01-01 RX ADMIN — INSULIN ASPART 1 UNITS: 100 INJECTION, SOLUTION INTRAVENOUS; SUBCUTANEOUS at 11:02

## 2017-01-01 RX ADMIN — IPRATROPIUM BROMIDE AND ALBUTEROL SULFATE 3 ML: .5; 3 SOLUTION RESPIRATORY (INHALATION) at 12:02

## 2017-01-01 RX ADMIN — NIFEDIPINE 60 MG: 30 TABLET, FILM COATED, EXTENDED RELEASE ORAL at 08:02

## 2017-01-01 RX ADMIN — FERROUS SULFATE TAB EC 324 MG (65 MG FE EQUIVALENT) 325 MG: 324 (65 FE) TABLET DELAYED RESPONSE at 08:02

## 2017-01-01 RX ADMIN — INSULIN ASPART 6 UNITS: 100 INJECTION, SOLUTION INTRAVENOUS; SUBCUTANEOUS at 02:02

## 2017-01-01 RX ADMIN — LABETALOL HYDROCHLORIDE 10 MG: 5 INJECTION, SOLUTION INTRAVENOUS at 09:02

## 2017-01-01 RX ADMIN — AMIODARONE HYDROCHLORIDE 200 MG: 200 TABLET ORAL at 06:02

## 2017-01-01 RX ADMIN — INSULIN ASPART 6 UNITS: 100 INJECTION, SOLUTION INTRAVENOUS; SUBCUTANEOUS at 06:02

## 2017-01-01 RX ADMIN — INFLUENZA A VIRUS A/CALIFORNIA/7/2009 X-179A (H1N1) ANTIGEN (FORMALDEHYDE INACTIVATED), INFLUENZA A VIRUS A/HONG KONG/4801/2014 X-263B (H3N2) ANTIGEN (FORMALDEHYDE INACTIVATED), AND INFLUENZA B VIRUS B/BRISBANE/60/2008 ANTIGEN (FORMALDEHYDE INACTIVATED) 0.5 ML: 60; 60; 60 INJECTION, SUSPENSION INTRAMUSCULAR at 10:02

## 2017-01-01 RX ADMIN — ATORVASTATIN CALCIUM 40 MG: 20 TABLET, FILM COATED ORAL at 11:02

## 2017-01-01 RX ADMIN — METOPROLOL TARTRATE 50 MG: 50 TABLET ORAL at 10:02

## 2017-01-01 RX ADMIN — AMIODARONE HYDROCHLORIDE 200 MG: 200 TABLET ORAL at 02:02

## 2017-01-01 RX ADMIN — INSULIN ASPART 8 UNITS: 100 INJECTION, SOLUTION INTRAVENOUS; SUBCUTANEOUS at 05:02

## 2017-01-01 RX ADMIN — IPRATROPIUM BROMIDE AND ALBUTEROL SULFATE 3 ML: .5; 3 SOLUTION RESPIRATORY (INHALATION) at 06:02

## 2017-01-01 RX ADMIN — METOPROLOL TARTRATE 50 MG: 50 TABLET ORAL at 09:02

## 2017-01-01 RX ADMIN — AMIODARONE HYDROCHLORIDE 200 MG: 200 TABLET ORAL at 10:02

## 2017-01-01 RX ADMIN — HYDRALAZINE HYDROCHLORIDE 25 MG: 25 TABLET, FILM COATED ORAL at 09:02

## 2017-01-01 RX ADMIN — CALCITRIOL 0.25 MCG: 0.25 CAPSULE, LIQUID FILLED ORAL at 01:02

## 2017-01-01 RX ADMIN — ASPIRIN 81 MG: 81 TABLET, COATED ORAL at 08:02

## 2017-01-01 RX ADMIN — INSULIN ASPART 2 UNITS: 100 INJECTION, SOLUTION INTRAVENOUS; SUBCUTANEOUS at 05:02

## 2017-01-01 RX ADMIN — AMIODARONE HYDROCHLORIDE 200 MG: 200 TABLET ORAL at 08:02

## 2017-01-01 RX ADMIN — ATORVASTATIN CALCIUM 40 MG: 20 TABLET, FILM COATED ORAL at 10:02

## 2017-01-01 RX ADMIN — NIFEDIPINE 60 MG: 30 TABLET, FILM COATED, EXTENDED RELEASE ORAL at 10:02

## 2017-01-01 RX ADMIN — CALCITRIOL 0.25 MCG: 0.25 CAPSULE, LIQUID FILLED ORAL at 11:02

## 2017-01-01 RX ADMIN — LABETALOL HYDROCHLORIDE 10 MG: 5 INJECTION, SOLUTION INTRAVENOUS at 05:02

## 2017-01-01 RX ADMIN — INSULIN DETEMIR 5 UNITS: 100 INJECTION, SOLUTION SUBCUTANEOUS at 09:02

## 2017-01-01 RX ADMIN — HEPARIN SODIUM 5000 UNITS: 5000 INJECTION, SOLUTION INTRAVENOUS; SUBCUTANEOUS at 01:02

## 2017-01-01 RX ADMIN — FEBUXOSTAT 40 MG: 40 TABLET ORAL at 08:02

## 2017-01-01 RX ADMIN — ALLOPURINOL 200 MG: 100 TABLET ORAL at 08:02

## 2017-01-01 RX ADMIN — FUROSEMIDE 10 MG: 10 INJECTION, SOLUTION INTRAMUSCULAR; INTRAVENOUS at 02:02

## 2017-01-01 RX ADMIN — HYDRALAZINE HYDROCHLORIDE 25 MG: 25 TABLET, FILM COATED ORAL at 01:02

## 2017-01-01 RX ADMIN — AMIODARONE HYDROCHLORIDE 150 MG: 1.5 INJECTION, SOLUTION INTRAVENOUS at 01:02

## 2017-01-01 RX ADMIN — INSULIN ASPART 2 UNITS: 100 INJECTION, SOLUTION INTRAVENOUS; SUBCUTANEOUS at 12:02

## 2017-01-01 RX ADMIN — HYDRALAZINE HYDROCHLORIDE 25 MG: 25 TABLET, FILM COATED ORAL at 02:02

## 2017-01-01 RX ADMIN — HEPARIN SODIUM 5000 UNITS: 5000 INJECTION, SOLUTION INTRAVENOUS; SUBCUTANEOUS at 06:02

## 2017-01-01 RX ADMIN — SODIUM CHLORIDE: 0.45 INJECTION, SOLUTION INTRAVENOUS at 05:02

## 2017-01-01 RX ADMIN — VITAMIN D, TAB 1000IU (100/BT) 2000 UNITS: 25 TAB at 08:02

## 2017-01-01 RX ADMIN — ASPIRIN 81 MG: 81 TABLET, COATED ORAL at 10:02

## 2017-01-01 RX ADMIN — FENTANYL CITRATE 25 MCG: 50 INJECTION, SOLUTION INTRAMUSCULAR; INTRAVENOUS at 08:02

## 2017-01-01 RX ADMIN — INSULIN ASPART 4 UNITS: 100 INJECTION, SOLUTION INTRAVENOUS; SUBCUTANEOUS at 01:02

## 2017-01-01 RX ADMIN — INSULIN DETEMIR 25 UNITS: 100 INJECTION, SOLUTION SUBCUTANEOUS at 09:02

## 2017-01-01 RX ADMIN — SODIUM CHLORIDE: 0.9 INJECTION, SOLUTION INTRAVENOUS at 12:02

## 2017-01-01 RX ADMIN — LISINOPRIL 20 MG: 20 TABLET ORAL at 02:02

## 2017-01-01 RX ADMIN — LABETALOL HYDROCHLORIDE 10 MG: 5 INJECTION, SOLUTION INTRAVENOUS at 12:02

## 2017-01-01 RX ADMIN — ALLOPURINOL 200 MG: 100 TABLET ORAL at 01:02

## 2017-01-01 RX ADMIN — AMIODARONE HYDROCHLORIDE 200 MG: 200 TABLET ORAL at 12:02

## 2017-01-01 RX ADMIN — INSULIN ASPART 1 UNITS: 100 INJECTION, SOLUTION INTRAVENOUS; SUBCUTANEOUS at 05:02

## 2017-01-01 RX ADMIN — VITAMIN D, TAB 1000IU (100/BT) 2000 UNITS: 25 TAB at 01:02

## 2017-01-01 RX ADMIN — SODIUM CHLORIDE: 0.9 INJECTION, SOLUTION INTRAVENOUS at 09:02

## 2017-01-01 RX ADMIN — SODIUM CHLORIDE: 0.45 INJECTION, SOLUTION INTRAVENOUS at 09:02

## 2017-01-01 RX ADMIN — INSULIN ASPART 10 UNITS: 100 INJECTION, SOLUTION INTRAVENOUS; SUBCUTANEOUS at 02:02

## 2017-01-01 RX ADMIN — PROPOFOL 30 MG: 10 INJECTION, EMULSION INTRAVENOUS at 07:02

## 2017-01-01 RX ADMIN — INSULIN ASPART 4 UNITS: 100 INJECTION, SOLUTION INTRAVENOUS; SUBCUTANEOUS at 03:02

## 2017-01-01 RX ADMIN — INSULIN ASPART 4 UNITS: 100 INJECTION, SOLUTION INTRAVENOUS; SUBCUTANEOUS at 07:02

## 2017-01-01 RX ADMIN — SODIUM CHLORIDE: 0.9 INJECTION, SOLUTION INTRAVENOUS at 02:02

## 2017-01-01 RX ADMIN — METOPROLOL TARTRATE 50 MG: 50 TABLET ORAL at 01:02

## 2017-01-01 RX ADMIN — METOPROLOL TARTRATE 50 MG: 50 TABLET ORAL at 02:02

## 2017-01-01 RX ADMIN — AMIODARONE HYDROCHLORIDE 200 MG: 200 TABLET ORAL at 11:02

## 2017-01-01 RX ADMIN — INSULIN DETEMIR 25 UNITS: 100 INJECTION, SOLUTION SUBCUTANEOUS at 08:02

## 2017-01-01 RX ADMIN — LORAZEPAM 0.5 MG: 2 INJECTION, SOLUTION INTRAMUSCULAR; INTRAVENOUS at 03:02

## 2017-01-01 RX ADMIN — INSULIN ASPART 2 UNITS: 100 INJECTION, SOLUTION INTRAVENOUS; SUBCUTANEOUS at 09:02

## 2017-01-01 RX ADMIN — AMIODARONE HYDROCHLORIDE 0.5 MG/MIN: 1.8 INJECTION, SOLUTION INTRAVENOUS at 02:02

## 2017-01-01 RX ADMIN — INSULIN ASPART 4 UNITS: 100 INJECTION, SOLUTION INTRAVENOUS; SUBCUTANEOUS at 05:02

## 2017-01-01 RX ADMIN — METOPROLOL TARTRATE 50 MG: 50 TABLET ORAL at 11:02

## 2017-01-01 RX ADMIN — FAMOTIDINE 20 MG: 10 INJECTION, SOLUTION INTRAVENOUS at 11:02

## 2017-01-01 RX ADMIN — FERROUS SULFATE TAB EC 324 MG (65 MG FE EQUIVALENT) 325 MG: 324 (65 FE) TABLET DELAYED RESPONSE at 09:02

## 2017-01-01 RX ADMIN — FERROUS SULFATE TAB EC 324 MG (65 MG FE EQUIVALENT) 325 MG: 324 (65 FE) TABLET DELAYED RESPONSE at 10:02

## 2017-01-01 RX ADMIN — VITAMIN D, TAB 1000IU (100/BT) 2000 UNITS: 25 TAB at 11:02

## 2017-01-01 RX ADMIN — CALCITRIOL 0.25 MCG: 0.25 CAPSULE, LIQUID FILLED ORAL at 08:02

## 2017-01-01 RX ADMIN — IPRATROPIUM BROMIDE AND ALBUTEROL SULFATE 3 ML: .5; 3 SOLUTION RESPIRATORY (INHALATION) at 08:02

## 2017-01-01 RX ADMIN — HEPARIN SODIUM 5000 UNITS: 5000 INJECTION, SOLUTION INTRAVENOUS; SUBCUTANEOUS at 10:02

## 2017-01-01 RX ADMIN — CALCITRIOL 0.25 MCG: 0.25 CAPSULE, LIQUID FILLED ORAL at 10:02

## 2017-01-01 RX ADMIN — VITAMIN D, TAB 1000IU (100/BT) 2000 UNITS: 25 TAB at 10:02

## 2017-01-01 RX ADMIN — AMIODARONE HYDROCHLORIDE 1 MG/MIN: 1.8 INJECTION, SOLUTION INTRAVENOUS at 01:02

## 2017-01-01 RX ADMIN — FEBUXOSTAT 40 MG: 40 TABLET ORAL at 02:02

## 2017-01-01 RX ADMIN — AMIODARONE HYDROCHLORIDE 0.5 MG/MIN: 1.8 INJECTION, SOLUTION INTRAVENOUS at 07:02

## 2017-01-01 RX ADMIN — HYDRALAZINE HYDROCHLORIDE 25 MG: 25 TABLET, FILM COATED ORAL at 06:02

## 2017-01-01 RX ADMIN — INSULIN ASPART 6 UNITS: 100 INJECTION, SOLUTION INTRAVENOUS; SUBCUTANEOUS at 05:02

## 2017-01-01 RX ADMIN — CLOPIDOGREL 75 MG: 75 TABLET, FILM COATED ORAL at 10:02

## 2017-01-01 RX ADMIN — AMIODARONE HYDROCHLORIDE 200 MG: 200 TABLET ORAL at 01:02

## 2017-01-01 RX ADMIN — INSULIN ASPART 3 UNITS: 100 INJECTION, SOLUTION INTRAVENOUS; SUBCUTANEOUS at 04:02

## 2017-01-01 RX ADMIN — FERROUS SULFATE TAB EC 324 MG (65 MG FE EQUIVALENT) 325 MG: 324 (65 FE) TABLET DELAYED RESPONSE at 11:02

## 2017-01-01 RX ADMIN — FAMOTIDINE 20 MG: 10 INJECTION, SOLUTION INTRAVENOUS at 01:02

## 2017-01-01 RX ADMIN — METOPROLOL TARTRATE 25 MG: 25 TABLET, FILM COATED ORAL at 09:02

## 2017-01-01 RX ADMIN — INSULIN ASPART 4 UNITS: 100 INJECTION, SOLUTION INTRAVENOUS; SUBCUTANEOUS at 06:02

## 2017-01-01 RX ADMIN — ASPIRIN 81 MG: 81 TABLET, COATED ORAL at 01:02

## 2017-01-01 RX ADMIN — FEBUXOSTAT 40 MG: 40 TABLET ORAL at 11:02

## 2017-01-01 RX ADMIN — INSULIN ASPART 4 UNITS: 100 INJECTION, SOLUTION INTRAVENOUS; SUBCUTANEOUS at 02:02

## 2017-01-01 RX ADMIN — INSULIN ASPART 10 UNITS: 100 INJECTION, SOLUTION INTRAVENOUS; SUBCUTANEOUS at 05:02

## 2017-01-01 RX ADMIN — HYDRALAZINE HYDROCHLORIDE 20 MG: 20 INJECTION INTRAMUSCULAR; INTRAVENOUS at 03:02

## 2017-01-01 RX ADMIN — INSULIN ASPART 4 UNITS: 100 INJECTION, SOLUTION INTRAVENOUS; SUBCUTANEOUS at 11:02

## 2017-01-01 RX ADMIN — INSULIN DETEMIR 25 UNITS: 100 INJECTION, SOLUTION SUBCUTANEOUS at 11:02

## 2017-01-01 RX ADMIN — FUROSEMIDE 40 MG: 10 INJECTION, SOLUTION INTRAMUSCULAR; INTRAVENOUS at 10:02

## 2017-01-01 RX ADMIN — LABETALOL HYDROCHLORIDE 10 MG: 5 INJECTION, SOLUTION INTRAVENOUS at 08:02

## 2017-01-01 RX ADMIN — FUROSEMIDE 40 MG: 10 INJECTION, SOLUTION INTRAMUSCULAR; INTRAVENOUS at 01:02

## 2017-01-01 RX ADMIN — AMIODARONE HYDROCHLORIDE 200 MG: 200 TABLET ORAL at 07:02

## 2017-01-01 RX ADMIN — INSULIN ASPART 8 UNITS: 100 INJECTION, SOLUTION INTRAVENOUS; SUBCUTANEOUS at 11:02

## 2017-01-01 RX ADMIN — HYDRALAZINE HYDROCHLORIDE 25 MG: 25 TABLET, FILM COATED ORAL at 10:02

## 2017-01-01 RX ADMIN — IPRATROPIUM BROMIDE AND ALBUTEROL SULFATE 3 ML: .5; 3 SOLUTION RESPIRATORY (INHALATION) at 02:02

## 2017-01-01 RX ADMIN — METOPROLOL TARTRATE 50 MG: 50 TABLET ORAL at 08:02

## 2017-01-01 RX ADMIN — METOPROLOL TARTRATE 50 MG: 50 TABLET ORAL at 06:02

## 2017-02-13 PROBLEM — I63.9 CVA (CEREBRAL VASCULAR ACCIDENT): Status: ACTIVE | Noted: 2017-01-01

## 2017-02-13 PROBLEM — R47.01 APHASIA: Status: ACTIVE | Noted: 2017-01-01

## 2017-02-13 PROBLEM — N18.30 CKD (CHRONIC KIDNEY DISEASE), STAGE III: Status: ACTIVE | Noted: 2017-01-01

## 2017-02-13 PROBLEM — G81.91 HEMIPARESIS, RIGHT: Status: ACTIVE | Noted: 2017-01-01

## 2017-02-13 PROBLEM — E11.22 TYPE 2 DIABETES MELLITUS WITH DIABETIC CHRONIC KIDNEY DISEASE: Status: ACTIVE | Noted: 2017-01-01

## 2017-02-13 PROBLEM — D50.9 IRON DEFICIENCY ANEMIA: Status: ACTIVE | Noted: 2017-01-01

## 2017-02-13 PROBLEM — R13.11 ORAL PHASE DYSPHAGIA: Status: ACTIVE | Noted: 2017-01-01

## 2017-02-13 PROBLEM — I10 HTN (HYPERTENSION), MALIGNANT: Status: ACTIVE | Noted: 2017-01-01

## 2017-02-13 NOTE — PLAN OF CARE
Problem: Patient Care Overview  Goal: Plan of Care Review  Outcome: Ongoing (interventions implemented as appropriate)  Pt arrived via helicopter at 1335. Pt nonverbal, follows simple commands at times, and ZOHAIB orientation. Afebrile, NSR, and hypertensive. HTN managed with PRN labetalol and hydralazine. Sat's >95% on 2 L NC. Noticeable right sided weakness and deficits. Right sided facial droop noted. RUE flaccid; LUE weakness. Neuro consulted. MRI of brain and neck ordered. NG tube placed, waiting verification from xray for use. Marcial in place on arrival, adequate UOP; yellow/concentrated. ECHO and 12 lead preformed. NS continuous at 125 cc/hr. Safety maintained. Free from falls or injury. Bed locked and in low position. Side rails x3. Will continue to monitor closely.

## 2017-02-13 NOTE — TELEMEDICINE CONSULT
[facility]  Tele-Consult    Consultation started: 2/13/2017 at 11:06 AM   Consulting Provider:  Mandie  The patient location is [facility]  Spoke hospital nurse at bedside with patient assisting consultant.     Subjective:   Subjective   History of Present Illness:  Esteban Goins is a 77 y.o. male who presents with aphasia and right hemipleegia.  Found by brother at 0900 this morning.     These symptoms have never happened before. There are no identified triggers or modifying factors. There have been no recurrent events. There are no other associated symptoms.    Wake up Stroke?: yes  Last known normal:  2000  Recent bleeding noted: no  Does the patient take any Blood Thinners? no     H&P was obtained from relative(s).  Past Medical History: HTN, MI, DM    Medications: No current outpatient prescriptions on file.    Allergies: Review of patient's allergies indicates:  Allergies not on file    Objective:     Vitals: There were no vitals filed for this visit.     Objective   Physical Exam:  General: well developed, well nourished   HENT: Head:normocephalic and atraumatic   HENT: Ears: right ear normal and left ear normal  Nose: normal nares and no discharge  Eyes:conjunctivae/corneas clear. PERRL.  Neck: normal, no obvious masses and trachea to midline  Lungs: normal respiratory effort  Cardiovascular: Heart: regular rate and rhythm   Cardiovascular: Extremities: no cyanosis, no edema and no clubbing  Abdomen: appears normal and not distended  Skin:  skin color and texture normal, no rash and no bruises  Musculoskeletal: normal range of motion in all extremities       Imaging Notes: Abnormal CT mild hypodensity in left MCA territoy consistet w/ EIC's, no hyperdensev vessel seen. Impression performed at: 1112    NIH Stroke Scale:  Interval: baseline (upon arrival/admit)  Level of Consciousness: 0 - alert  LOC Questions: 2 - answers none correctly  LOC Commands: 1 -  performs one correctly  Best Gaze: 1 - partial gaze palsy  Visual: 0 - no visual loss  Facial Palsy: 0 - normal  Motor Left Arm: 0 - no drift  Motor Right Arm: 2 - can't resist gravity  Motor Left Le - no drift  Motor Right Leg: 3 - no effort against gravity  Limb Ataxia: 0 - absent  Sensory: 1 - mild to moderate loss  Best Language: 2 - severe aphasia  Dysarthria: 2 - near to unintelligible  Extinction and Inattention: 0 - no neglect  NIH Stroke Scale Total: 14        Assessment - Diagnosis - Goals:     Plan     Diagnosis/Impression: subacute left MCA stroke; early ischemic changes noted in the left MCA territory sparing the deep territorires likely consistent w/ subacute distal branch occlusion; out of window for IVtPA and endovascular, and likely not LVO given topography on CT; no proximal hyperdensity seen either    TPA Recommendation: TPA not recommended due to Outside of treament window    Recommendation: NPO until after pass dysphagia screen. Diagnostic studies: CTA Head to assess vasculature , CTA Neck/Arch to assess vasculature, HgbA1C to assess blood glucose levels, Lipid Profile to assess cholesterol levels, MRI head without contrast to assess brain parenchyma, Trans-thoracic cardiac echo to assess cardiac function/status  Consults: Rehab Consult; Occupational Therapy, Rehab Consult; Physical Therapy and Rehab Consult; Speech Therapy  Antithrombotics: Aspirin: 325 mg oral daily  Statins: Atorvastatin- 40 mg oral daily    Disposition Recommendation:  transfer to [facility] by  ground  stat       Possible Interventional Revascularization Candidate? out of window for therapy, changes arleady seen on CT scan and no clues to proximal occlusion    Recommended the emergency room physician to have a brief discussion with the patient and/or family if available regarding the risks and benefits of treatment, and to briefly document the occurrence of that discussion in his clinical encounter note.      The attending portion of this evaluation, treatment, and documentation was performed per Aren Potter via audiovisual.      Billing code:  (moderate to severe stroke, large areas of edema, some mimics)    · This patient has a critical neurological condition/illness, with high morbidity and mortality.  · There is a high probability for acute neurological change leading to clinical and possibly life-threatening deterioration requiring highest level of physician preparedness for urgent intervention.  · Care was coordinated with other physicians involved in the patient's care.  · Radiologic studies and laboratory data were reviewed and interpreted, and plan of care was re-assessed based on the results.  · Diagnosis, treatment options and prognosis may have been discussed with the patient and/or family members or caregiver.  Further advanced medical management and further evaluation is warranted for his care.      Consultation ended: 2/13/2017 at 1136    Marcial Potter MD  Vascular Neurology

## 2017-02-13 NOTE — SUBJECTIVE & OBJECTIVE
Past Medical History   Diagnosis Date    Coronary artery disease     Diabetes mellitus     Hypertension     MI (myocardial infarction)        History reviewed. No pertinent past surgical history.    Review of patient's allergies indicates:  No Known Allergies    Current Neurological Medications: none    No current facility-administered medications on file prior to encounter.      No current outpatient prescriptions on file prior to encounter.     Family History     None        Social History Main Topics    Smoking status: Unknown If Ever Smoked    Smokeless tobacco: Not on file    Alcohol use Not on file    Drug use: Not on file    Sexual activity: Not on file     Review of Systems   Unable to perform ROS: Patient nonverbal     Objective:     Vital Signs (Most Recent):  Temp: 97 °F (36.1 °C) (02/13/17 1500)  Pulse: 70 (02/13/17 1545)  Resp: 17 (02/13/17 1545)  BP: (!) 222/115 (02/13/17 1545)  SpO2: 99 % (02/13/17 1545) Vital Signs (24h Range):  Temp:  [96.5 °F (35.8 °C)-97 °F (36.1 °C)] 97 °F (36.1 °C)  Pulse:  [68-86] 70  Resp:  [13-19] 17  SpO2:  [91 %-100 %] 99 %  BP: (222-268)/(105-143) 222/115     Weight: 84.7 kg (186 lb 11.7 oz)  Body mass index is 26.79 kg/(m^2).    Physical Exam   Constitutional: He appears well-developed.   HENT:   Head: Normocephalic and atraumatic.   Eyes: EOM are normal. Pupils are equal, round, and reactive to light.   Neck: Normal range of motion. Neck supple. Carotid bruit is not present.   Neurological: Finger-nose-finger test: Normal on the left.   Reflex Scores:       Tricep reflexes are 1+ on the right side and 1+ on the left side.       Bicep reflexes are 1+ on the right side and 1+ on the left side.       Brachioradialis reflexes are 1+ on the right side and 1+ on the left side.       Patellar reflexes are 1+ on the right side and 0 on the left side.       Achilles reflexes are 0 on the right side and 0 on the left side.  Vitals reviewed.      NEUROLOGICAL EXAMINATION:      MENTAL STATUS   Speech: mute   Level of consciousness: alert       Patient is awake and responsive but essentially nonverbal secondary to a global aphasia predominantly expressive.  He follows simple commands such as grasping my hand on the left.     CRANIAL NERVES     CN II   Visual fields full to confrontation.     CN III, IV, VI   Pupils are equal, round, and reactive to light.  Extraocular motions are normal.     CN V   Facial sensation intact.     CN VII   Right facial weakness: central  Left facial weakness: none    CN VIII   CN VIII normal.     CN IX, X   CN IX normal.   CN X normal.     CN XI   CN XI normal.     CN XII   CN XII normal.        Gag slightly diminished     MOTOR EXAM     Strength   Right strength: Dense right hemiparesis with power 0/5 in upper extremity and only minimal movements of the toes with power in the lower extremities being 0/5 decreased tone.  Left strength: Normal power and tone on the left    REFLEXES     Reflexes   Right brachioradialis: 1+  Left brachioradialis: 1+  Right biceps: 1+  Left biceps: 1+  Right triceps: 1+  Left triceps: 1+  Right patellar: 1+  Left patellar: 0  Right achilles: 0  Left achilles: 0  Right plantar: upgoing  Left plantar: normal    SENSORY EXAM        Difficult to reliably assess because of his expressive aphasia but withdrawal responses to stimuli on the left are good.     GAIT AND COORDINATION      Coordination   Finger-nose-finger test: Normal on the left.    Tremor   Resting tremor: absent  Intention tremor: absent  Action tremor: absent       Gait not tested however the patient unable to ambulate because of the right hemiplegia       Significant Labs: All pertinent lab results from the past 24 hours have been reviewed.    Significant Imaging: I have reviewed all pertinent imaging results/findings within the past 24 hours.

## 2017-02-13 NOTE — PLAN OF CARE
02/13/17 1729   Discharge Assessment   Assessment Type Discharge Planning Assessment   Confirmed/corrected address and phone number on facesheet? Yes   Assessment information obtained from? Patient;Medical Record   Prior to hospitilization cognitive status: Alert/Oriented   Prior to hospitalization functional status: Assistive Equipment   Current cognitive status: Coma/Sedated/Intubated   Arrived From home or self-care   Able to Return to Prior Arrangements unable to determine at this time (comments)   Is patient able to care for self after discharge? Unable to determine at this time (comments)   How many people do you have in your home that can help with your care after discharge? 1   Patient currently being followed by outpatient case management? Unable to determine (comments)   On Dialysis? No   Discharge Plan A Skilled Nursing Facility   Discharge Plan B Rehab   Patient/Family In Agreement With Plan yes

## 2017-02-13 NOTE — CONSULTS
[facility]  Neurology  Consult Note    Patient Name: Esteban Goins  MRN: [id number]  Admission Date: 2/13/2017  Hospital Length of Stay: 0 days  Code Status: Full Code   Attending Provider: Christos Hernández MD   Consulting Provider: Florencio Gastelum MD  Primary Care Physician: Primary Doctor No  Principal Problem:CVA (cerebral vascular accident)    Consults   Subjective:     Chief Complaint:  Stroke     HPI:   This is a 77-year-old male who was referred for evaluation of right-sided weakness.  The patient apparently was found this morning by his brother, in bed with a dense right-sided weakness and significant speech difficulty.  It is reported that when he went to bed last night he had talked his brother and was reported to have no symptoms. EMS was called and he was brought to [facility].  A CT of the head showed a small 5mm low density area in the left thalamus consistent with a lacunar infarct. Chronic small vessel ischemic changes are noted.  On arrival to the emergency room he was noted to be severely hypertensive (200/119).  He was then transferred to this facility for a higher level of care.  The patient was unable to give any history because of the severe global aphasia.  Medical records from [facility] were available for review.  The patient was evaluated by telemetry medicine-neurology and was felt not to be a candidate for TPA.     Past Medical History   Diagnosis Date    Coronary artery disease     Diabetes mellitus     Hypertension     MI (myocardial infarction)        History reviewed. No pertinent past surgical history.    Review of patient's allergies indicates:  No Known Allergies    Current Neurological Medications: none    No current facility-administered medications on file prior to encounter.      No current outpatient prescriptions on file prior to encounter.     Family History     None        Social History Main Topics    Smoking  status: Unknown If Ever Smoked    Smokeless tobacco: Not on file    Alcohol use Not on file    Drug use: Not on file    Sexual activity: Not on file     Review of Systems   Unable to perform ROS: Patient nonverbal     Objective:     Vital Signs (Most Recent):  Temp: 97 °F (36.1 °C) (02/13/17 1500)  Pulse: 70 (02/13/17 1545)  Resp: 17 (02/13/17 1545)  BP: (!) 222/115 (02/13/17 1545)  SpO2: 99 % (02/13/17 1545) Vital Signs (24h Range):  Temp:  [96.5 °F (35.8 °C)-97 °F (36.1 °C)] 97 °F (36.1 °C)  Pulse:  [68-86] 70  Resp:  [13-19] 17  SpO2:  [91 %-100 %] 99 %  BP: (222-268)/(105-143) 222/115     Weight: 84.7 kg (186 lb 11.7 oz)  Body mass index is 26.79 kg/(m^2).    Physical Exam   Constitutional: He appears well-developed.   HENT:   Head: Normocephalic and atraumatic.   Eyes: EOM are normal. Pupils are equal, round, and reactive to light.   Neck: Normal range of motion. Neck supple. Carotid bruit is not present.   Neurological: Finger-nose-finger test: Normal on the left.   Reflex Scores:       Tricep reflexes are 1+ on the right side and 1+ on the left side.       Bicep reflexes are 1+ on the right side and 1+ on the left side.       Brachioradialis reflexes are 1+ on the right side and 1+ on the left side.       Patellar reflexes are 1+ on the right side and 0 on the left side.       Achilles reflexes are 0 on the right side and 0 on the left side.  Vitals reviewed.      NEUROLOGICAL EXAMINATION:     MENTAL STATUS   Speech: mute   Level of consciousness: alert       Patient is awake and responsive but essentially nonverbal secondary to a global aphasia predominantly expressive.  He follows simple commands such as grasping my hand on the left.     CRANIAL NERVES     CN II   Visual fields full to confrontation.     CN III, IV, VI   Pupils are equal, round, and reactive to light.  Extraocular motions are normal.     CN V   Facial sensation intact.     CN VII   Right facial weakness: central  Left facial weakness:  none    CN VIII   CN VIII normal.     CN IX, X   CN IX normal.   CN X normal.     CN XI   CN XI normal.     CN XII   CN XII normal.        Gag slightly diminished     MOTOR EXAM     Strength   Right strength: Dense right hemiparesis with power 0/5 in upper extremity and only minimal movements of the toes with power in the lower extremities being 0/5 decreased tone.  Left strength: Normal power and tone on the left    REFLEXES     Reflexes   Right brachioradialis: 1+  Left brachioradialis: 1+  Right biceps: 1+  Left biceps: 1+  Right triceps: 1+  Left triceps: 1+  Right patellar: 1+  Left patellar: 0  Right achilles: 0  Left achilles: 0  Right plantar: upgoing  Left plantar: normal    SENSORY EXAM        Difficult to reliably assess because of his expressive aphasia but withdrawal responses to stimuli on the left are good.     GAIT AND COORDINATION      Coordination   Finger-nose-finger test: Normal on the left.    Tremor   Resting tremor: absent  Intention tremor: absent  Action tremor: absent       Gait not tested however the patient unable to ambulate because of the right hemiplegia       Significant Labs: All pertinent lab results from the past 24 hours have been reviewed.    Significant Imaging: I have reviewed all pertinent imaging results/findings within the past 24 hours.    Assessment and Plan:     * CVA (cerebral vascular accident)  See neurology assessment below    Hemiparesis, right  The patient's clinical history and findings are consistent with an acute left thalamic/internal capsule infarct with residual dense right hemiparesis and predominantly expressive aphasia.  Risk factors with uncontrolled hypertension, history of diabetes.  Recommendations: Management of hypertension as per protocol gradually improving systolic blood pressure over the next 24-48 hours.  MRI scan of the brain and MRA of the brain and neck however avoid contrast in view of the abnormal renal function.  Speech therapy evaluation  for swallowing and expressive aphasia.  Nothing by mouth until swallowing assessment is completed.  PT/OT evaluation.  We will follow patient with you      VTE Risk Mitigation         Ordered     heparin (porcine) injection 5,000 Units  Every 8 hours     Route:  Subcutaneous        02/13/17 1421     Medium Risk of VTE  Once      02/13/17 1421     Place sequential compression device  Until discontinued      02/13/17 1421          Thank you for your consult. I will follow-up with patient. Please contact us if you have any additional questions.    Florencio Gastelum MD  Neurology  [facility]

## 2017-02-13 NOTE — H&P
History & Physical  Hospital Medicine      SUBJECTIVE:     Chief Complaint/Reason for Admission: CVA (cerebral vascular accident)    History of Present Illness:  Esteban Clay is a 77 y.o. male who presents with right hemiparesis and aphasia. He was apparently found by his brother in the morning and it was noted that he had signs of a stroke. EMS was called and he was brought to [facility]. CT of the head shows Small 5mm low density area in the left thalamus poss. Small lacunar infarct. Chronic small vessel ischemic changes are noted. On arrival the pt. Was severely hypertensive (200/119). He was transferred to our facility for higher level of care.   On my evaluation the pt. Is in NAD, non verbal but awake. He has aphasia and can not follow commands. He moans to stimuli. Right arm shows flaccid paralysis, right leg is withdrawn to painful stimuli.      No family is available for questioning. The information is taken from the pt. Chart.   Review of patient's allergies indicates:      Past history includes DM2, CKD, HTN    Allergies not on file     No past medical history on file.  No past surgical history on file.  No family history on file.  Social History   Substance Use Topics    Smoking status: Not on file    Smokeless tobacco: Not on file    Alcohol use Not on file       Review of Systems:  Review of Systems   Unable to perform ROS: Mental status change         OBJECTIVE:     Vital Signs (Most Recent)       Physical Exam:  Physical Exam   Constitutional: He is well-developed, well-nourished, and in no distress.   HENT:   Head: Normocephalic and atraumatic.   Right facial droop   Eyes: Pupils are equal, round, and reactive to light.   Neck: Normal range of motion.   Cardiovascular: Normal rate and regular rhythm.    No murmur heard.  Pulmonary/Chest: Effort normal and breath sounds normal. No respiratory distress. He has no wheezes.   Abdominal: Soft. He exhibits no distension. There is no  tenderness.   Musculoskeletal: He exhibits no edema or deformity.   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 Right facial droop. PERRLA, flaccid right arm paralysis, withdraws to pain with right leg, but impaired strength (1/5).   Babinski negative.   Left leg good strength, left arm normal strength.    Skin: He is not diaphoretic.       Laboratory  CBC: No results for input(s): WBC, RBC, HGB, HCT, PLT, MCV, MCH, MCHC in the last 168 hours.    CMP: No results for input(s): GLU, CALCIUM, ALBUMIN, PROT, NA, K, CO2, CL, BUN, CREATININE, ALKPHOS, ALT, AST, BILITOT in the last 168 hours.      Diagnostic Results:  Labs: Reviewed       H/H 10/34, MCV 62. Troponin 0.017. BUN 34/Creatinine 2.3. Remainder WNL.     EKG NSR.     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       1: Acute CVA with right hemiparesis and aphasia  - unknown onset of sx.   - outside thrombolysis window  - Check MRI/MRA, Echo  - permissive HTN with PRN hydralazine for SBP > 200.   - neurology evaluation  - Speech evaluation  - ASA and Statin.     2: HTN:  - malignant HTN  - permissive HTN for now    3; CKD III  - likely ckd, but unknown baseline  - evidence for DM2 and proteinuria supports  - will need to get outpatient records to confirm baseline  - avoid nephrotoxins for now  - monitor trends.     4; Anemia  - likely Fe Deficient given MCV  - check iron panel, FOBT  - monitor    5: Prophylaxis  - Heparin

## 2017-02-13 NOTE — ASSESSMENT & PLAN NOTE
The patient's clinical history and findings are consistent with an acute left thalamic/internal capsule infarct with residual dense right hemiparesis and predominantly expressive aphasia.  Risk factors with uncontrolled hypertension, history of diabetes.  Recommendations: Management of hypertension as per protocol gradually improving systolic blood pressure over the next 24-48 hours.  MRI scan of the brain and MRA of the brain and neck however avoid contrast in view of the abnormal renal function.  Speech therapy evaluation for swallowing and expressive aphasia.  Nothing by mouth until swallowing assessment is completed.  PT/OT evaluation.  We will follow patient with you

## 2017-02-13 NOTE — TELEPHONE ENCOUNTER
----- Message from Cindy Owens sent at 2/13/2017  2:33 PM CST -----  Contact: Eun from [facility]   HOSPITAL CONSULT     Patient name: Esteban Goins  MRN [id number]  Room Number: ICU bed 8   Ordering physician: Dr. Hernández   Diagnosis: CVA   Nurse: Eun [phone]

## 2017-02-14 PROBLEM — I63.512 ACUTE ISCHEMIC LEFT MCA STROKE: Status: ACTIVE | Noted: 2017-01-01

## 2017-02-14 PROBLEM — I48.0 PAROXYSMAL ATRIAL FIBRILLATION: Status: ACTIVE | Noted: 2017-01-01

## 2017-02-14 NOTE — ASSESSMENT & PLAN NOTE
- Appears to have iron deficiency   - Iron studies pending.  - Stools for occult blood also pending.  - Likely element of anemia due to CKD as well

## 2017-02-14 NOTE — PROGRESS NOTES
[facility] Hospital Medicine  Progress Note    Patient Name: Esteban Goins  MRN: [id number]  Patient Class: IP- Inpatient   Admission Date: 2/13/2017  Length of Stay: 1 days  Attending Physician: Cristy Cullen MD  Primary Care Provider: Primary Doctor No        Subjective:     Principal Problem:Acute ischemic left MCA stroke    HPI:  Mr. Goins is a 77 year old man who was found by his brother with aphasia and right hemiparesis, was brought to [facility] and found to have severe hypertension and evidence of a stroke.  He was transferred to [facility] for a higher level of care.  On initial evaluation the patient was nonverbal and unable to follow commands.  Right arm showed flaccid paralysis and right leg withdrew to painful stimuli.  Initial labs showed likely stage IV CKD with proteinuria and uncontrolled diabetes.      Hospital Course:  Patient was admitted to the ICU and was seen by the neurologist.  MRI/MRA showed an occlusion of the left superior division of a segment of the MCA resulting in a large left cerebral infarction involving the left parietal, temporal and frontal cortices.  Permissive hypertension was recommended for 24-48 hours.  He was unable to speak or swallow when evaluated by Speech and an NGT was placed.  Overnight he developed atrial fibrillation with a rapid ventricular response and was started on an amiodarone drip.                Interval History:  Patient sleeping heavily and difficult to awaken, but eventually wakes up on his own.  Moves his left arm and leg randomly, not really following commands but makes eye contact.  NGT and oxygen in place.  He is in atrial fibrillation with .    Review of Systems   Unable to perform ROS: Patient unresponsive     Objective:     Vital Signs (Most Recent):  Temp: 98.3 °F (36.8 °C) (02/14/17 0730)  Pulse: 110 (02/14/17 0800)  Resp: (!) 28 (02/14/17 0800)  BP: (!) 181/85 (02/14/17 0800)  SpO2: 95 %  (02/14/17 0800) Vital Signs (24h Range):  Temp:  [96.5 °F (35.8 °C)-99.6 °F (37.6 °C)] 98.3 °F (36.8 °C)  Pulse:  [] 110  Resp:  [12-35] 28  SpO2:  [91 %-100 %] 95 %  BP: (143-268)/() 181/85     Weight: 83.6 kg (184 lb 4.9 oz)  Body mass index is 26.44 kg/(m^2).    Intake/Output Summary (Last 24 hours) at 02/14/17 0826  Last data filed at 02/14/17 0705   Gross per 24 hour   Intake          1814.62 ml   Output             1940 ml   Net          -125.38 ml      Physical Exam   Constitutional: He appears well-developed and well-nourished.   HENT:   Head: Normocephalic and atraumatic.   Edentulous.  Tongue protruding and appears dry.  Secretions evident in back of throat.   Eyes: Conjunctivae are normal. Pupils are equal, round, and reactive to light.   Squints his left eye.   Neck: Normal range of motion. Neck supple. No thyromegaly present.   Cardiovascular:   Irregularly irregular rhythm with .   Pulmonary/Chest: He has no wheezes. He has no rales.   Breathing unlabored, unable to take a deep breath on command.  Coarse upper airway noise.   Abdominal: Soft. Bowel sounds are normal. He exhibits distension. There is no tenderness.   Musculoskeletal:   Left hand deformed with only the thumb and 3rd digit remaining, heavily scarred.  Has trace of lower extremity edema right leg (dependent).   Neurological: He is alert.   Paralyzed on the right.  When asked to push down with left leg he moves his toes but is unable to follow specific directions.  Slight grasp with left hand on command.   Skin: Skin is warm and dry.       Significant Labs: All pertinent labs within the past 24 hours have been reviewed.    Significant Imaging: I have reviewed all pertinent imaging results/findings within the past 24 hours.    Assessment/Plan:      * Acute ischemic left MCA stroke  - Patient found down by brother, onset of symptoms unknown so presented outside thrombolysis window.  - MRI/MRA with large left MCA stroke.  -  2D echo with diastolic dysfunction  - Continue permission hypertension today with addition of oral metoprolol 25 mg bid.  - Speech/PT/OT  - Patient was on Plavix at home for uncertain reason - need records.    Hemiparesis, right  - Due to left MCA stroke.      Oral phase dysphagia  - Speech following and recommend NPO status for now.  - Secretions evident.  - Will need to start tube feeding soon.      Aphasia  - Expressive and receptive.  - Patient yawns frequently and vocalizes with moans  - Unable to speak but makes eye contact.      HTN (hypertension), malignant  - On lisinopril 40 mg daily at home.  - Continue to hold this today while starting oral metoprolol and atrial fibrillation.      Iron deficiency anemia  - Appears to have iron deficiency   - Iron studies pending.  - Stools for occult blood also pending.  - Likely element of anemia due to CKD as well       CKD (chronic kidney disease), stage III  - Chronic kidney disease stage III-IV with uncertain baseline.  - Patient on allopurinol for likely hyperuricemia  - Proteinuria with diabetes as contributor.  - Lisinopril held for now but will resume when permissive hypertension period is over.  - Non urgent consult to nephrology as this appears chronic.      Type 2 diabetes mellitus with diabetic chronic kidney disease  - Patient on glipizide at home - SSI while here.  - Will probably need to be started on insulin as appears to be poorly controlled.  - HbA1c and records pending.      Paroxysmal atrial fibrillation  - Patient in NSR on arrival but in atrial fibrillation currently  - On amiodarone drip and oral metoprolol added this morning.  - Consult cardiologist.      VTE Risk Mitigation         Ordered     heparin (porcine) injection 5,000 Units  Every 8 hours     Route:  Subcutaneous        02/13/17 1421     Medium Risk of VTE  Once      02/13/17 1421     Place sequential compression device  Until discontinued      02/13/17 1421          Cristy Maher,  MD  Department of Hospital Medicine   [facility]

## 2017-02-14 NOTE — PT/OT/SLP EVAL
Occupational Therapy  Evaluation/Treatment    Esteban Goins   MRN: [id number]   Admitting Diagnosis: Acute ischemic left MCA stroke    OT Date of Treatment: 02/14/17   OT Start Time: 1031  OT Stop Time: 1101  OT Total Time (min): 30 min    Billable Minutes:  Evaluation 10  Therapeutic Exercise 10  Neuromuscular Re-education 10  Total Time 30    Diagnosis: Acute ischemic left MCA stroke   The encounter diagnosis was CVA (cerebral vascular accident).      Past Medical History   Diagnosis Date    Coronary artery disease     Diabetes mellitus     Hypertension     MI (myocardial infarction)       History reviewed. No pertinent past surgical history.    Referring physician: Nika  Date referred to OT: 2/13/2017    General Precautions: Standard, fall, aspiration, NPO  Orthopedic Precautions: N/A  Braces: N/A    Do you have any cultural, spiritual, Lutheran conflicts, given your current situation?: none stated -pt nonverbal     Patient History:  Living Environment  Lives With: sibling(s)  Living Arrangements: mobile home (per chart review)  Home Accessibility: stairs to enter home (tub /shower combo)  Number of Stairs to Enter Home: 3  Stair Railings at Home: outside, present at both sides  Living Environment Comment: Per chart review, pt. lives alone up to 3 months ago moved in with brother to help with diabetes management and transportation.  Lives in trailer with 3 steps with BHR; has a tub/shower combo. Pt. indep ADLS, IADLS cooking/cleaning. P{t. does not drive; enjoys going to truck stop casinos.   Equipment Currently Used at Home: none    Prior level of function:   Bed Mobility/Transfers: independent (pt. nonverbal but per chart review, pt lperformed selfcare.  ongoing assessment)  Grooming: independent  Bathing: independent  Upper Body Dressing: independent  Lower Body Dressing: independent  Toileting: independent  Home Management Skills: independent  Driving License: No  Mode of Transportation: Family      Dominant hand:  unknown    Subjective:  Communicated with yahir prior to session.    Chief Complaint: none; nonverbal; expressive aphasia  Patient/Family stated goals: none  Pain Ratin/10 (pt. aphasic but no grimacing noted with movement)              Pain Rating Post-Intervention: 0/10    Objective:  Patient found with: telemetry, NG tube, oxygen, ward catheter    Cognitive Exam:  Oriented to: Person  Follows Commands/attention: Inattentive, Easily distracted and Follows one-step commands-inconsistent  Communication: expressive aphasia  Memory:  Impaired STM and Poor immediate recall  Safety awareness/insight to disability: impaired  Coping skills/emotional control: Appropriate to situation    Visual/perceptual:  Impaired  visual field R    Physical Exam:  Postural examination/scapula alignment: Rounded shoulder and Posterior pelvic tilt  Skin integrity: Visible skin intact  Edema: None noted BUE    Sensation:     No response to pain stim RUE nail bed     Upper Extremity Range of Motion:  Right Upper Extremity: no AROM; PROM wfl  Left Upper Extremity: WFL-hx only had thumb and index    Upper Extremity Strength:  Right Upper Extremity: 0/5  Left Upper Extremity: NT but moves LUE against gravity purposeful but not on command   Strength: L hand NT poor following instructions; R hand flaccid    Fine motor coordination:    absent R; fair L- did pinch grasp oral swab     Gross motor coordination:  NT    Functional Mobility:  Bed Mobility:  Scooting/Bridging: Total Assistance  Supine to Sit: Total Assistance  Sit to Supine: Total Assistance    Transfers:  Sit <> Stand Assistance: Activity did not occur    Functional Ambulation: *NA    Activities of Daily Living:  Feeding Level of Assistance: Activity did not occur (NG tube and NPO)    UE Dressing Level of Assistance: Total assistance    LE Dressing Level of Assistance: Total assistance (socks in bed)    Grooming Position: other (bedlevel)  Grooming Level of  "Assistance: Total assistance (Ruby to iniitate washing facw ith cloth; avoids oral care when swab placed in mouth;noxious )  Toileting Where Assessed: Bed level  Toileting Level of Assistance:  (dep in bed; ward)         Balance:   Static Sit: POOR+: Needs MINIMAL assist to maintain  Dynamic Sit: 0: N/A  Static Stand: NT  Dynamic stand: NT    Therapeutic Activities and Exercises:    Pt. Performed bed mobility with total assist forNDT segmental kiersten Marty side and supine<>Sit; sat EOB 5 min with min assist and moments of midline sitting posture with SBA but reverted back to LUE pushing to affected side (right side).  Pt. Returned to supine with total assist but did follow simple command to lower self onto elbow.  Grooming total assist with Ruby; pt. avoids mouth hygiene  With oral swab; noxious topt as he demonstrated with facial grimacing and moving head from side to side to avoid swab being placed in his mouth. RUE PROM x 10 reps all planes supine in bed; no pain; no resistance in shoulder. Dep of 2 to pull pt up to HOB using drawsheet.     AM-PAC 6 CLICK ADL  How much help from another person does this patient currently need?  1 = Unable, Total/Dependent Assistance  2 = A lot, Maximum/Moderate Assistance  3 = A little, Minimum/Contact Guard/Supervision  4 = None, Modified [place]/Independent    Putting on and taking off regular lower body clothing? : 1  Bathing (including washing, rinsing, drying)?: 1  Toileting, which includes using toilet, bedpan, or urinal? : 1  Putting on and taking off regular upper body clothing?: 1  Taking care of personal grooming such as brushing teeth?: 1  Eating meals?: 1  Total Score: 6    AM-PAC Raw Score CMS "G-Code Modifier Level of Impairment Assistance   6 % Total / Unable   7 - 9 CM 80 - 100% Maximal Assist   10 - 14 CL 60 - 80% Moderate Assist   15 - 19 CK 40 - 60% Moderate Assist   20 - 22 CJ 20 - 40% Minimal Assist   23 CI 1-20% SBA / CGA   24 CH 0% Independent/ Mod " I       Patient left left sideline with all lines intact, call button in reach, bed alarm on and nurse notified    Assessment:  Esteban Goins is a 77 y.o. male with a medical diagnosis of Acute ischemic left MCA stroke and presents with Pt. Limited by impaired cognition especially poor with following simple command and initiation,RUE and RLE flaccidity, expressive aphasia, R visual field deficits,  Midline orientation, impaired siting and standing balance limiting indep and safety in ADLS and fx mobility.  Pt. Performed bed mobility with total assist forNDT segmental kiersten Marty side and supine<>Sit; sat EOB 5 min with min assist and moments of midline sitting posture with SBA but reverted back to LUE pushing to affected side (right side).  Pt. Returned to supine with total assist but did follow simple command to lower self onto elbow.  Grooming total assist with [place]; pt. avoids mouth hygiene  With oral swab; noxious topt as he demonstrated with facial grimacing and moving head from side to side to avoid swab being placed in his mouth. RUE PROM x 10 reps all planes supine in bed; no pain; no resistance in shoulder. Dep of 2 to pull pt up to HOB using drawsheet. IPR recommended.  Pt. Was indep PLOF.  Continue with OT POC.    Rehab identified problem list/impairments: Rehab identified problem list/impairments: weakness, impaired sensation, impaired functional mobilty, impaired balance, impaired cognition, decreased upper extremity function, decreased safety awareness, abnormal tone, impaired coordination, impaired cardiopulmonary response to activity, impaired endurance, impaired self care skills, visual deficits, decreased coordination, decreased lower extremity function, decreased ROM, impaired fine motor    Rehab potential is good.    Activity tolerance: Fair    Discharge recommendations: Discharge Facility/Level Of Care Needs: rehabilitation facility     Barriers to discharge: Barriers to Discharge: Inaccessible  home environment, Decreased caregiver support    Equipment recommendations:  (TBD)     GOALS:   Occupational Therapy Goals        Problem: Occupational Therapy Goal    Goal Priority Disciplines Outcome Interventions   Occupational Therapy Goal     OT, PT/OT Ongoing (interventions implemented as appropriate)    Description:  Goals to be met by: 2/24/2017    Patient will increase functional independence with ADLs by performing:    Grooming while bedlevel with Moderate Assistance.  Sitting at edge of bed x20  minutes with Stand-by Assistance.  Supine to sit with Maximum Assistance.  Stand pivot transfers assessment without distress.  Increased functional strength to 2/5 for RUE.  RUE Upper extremity exercise program  10 reps per handout, with assistance as needed.                PLAN:  Patient to be seen 6 x/week to address the above listed problems via self-care/home management, therapeutic activities, cognitive retraining, neuromuscular re-education, therapeutic exercises  Plan of Care expires: 03/14/17  Plan of Care reviewed with: patient         Nini Isaias, OT  02/14/2017

## 2017-02-14 NOTE — PT/OT/SLP EVAL
Speech Language Pathology  Evaluation    Esteban Goins   MRN: [id number]   Admitting Diagnosis: CVA (cerebral vascular accident)    Diet recommendations: Solid Diet Level: NPO  Liquid Diet Level: NPO     SLP Treatment Date: 17  Speech Start Time: 1745     Speech Stop Time: 1803     Speech Total (min): 18 min       TREATMENT BILLABLE MINUTES:  Eval Swallow and Oral Function 18    Diagnosis: CVA (cerebral vascular accident)  Esteban Clay is a 77 y.o. male who presents with right hemiparesis and aphasia. He was apparently found by his brother in the morning and it was noted that he had signs of a stroke. EMS was called and he was brought to [facility]. CT of the head shows Small 5mm low density area in the left thalamus poss. Small lacunar infarct. Chronic small vessel ischemic changes are noted. On arrival the pt. Was severely hypertensive (200/119). He was transferred to our facility for higher level of care.   On my evaluation the pt. Is in NAD, non verbal but awake. He has aphasia and can not follow commands. He moans to stimuli. Right arm shows flaccid paralysis, right leg is withdrawn to painful stimuli.    Past Medical History   Diagnosis Date    Coronary artery disease     Diabetes mellitus     Hypertension     MI (myocardial infarction)      History reviewed. No pertinent past surgical history.    Has the patient been evaluated by SLP for swallowing? : Yes  Keep patient NPO?: Yes   General Precautions: Standard, aspiration, aphasia, fall    Current Respiratory Status: nasal cannula     Prior diet: not known at this time      Subjective:  Pt had eyes closed on arrival; NGT in place.  Pt initially nonverbal.  Pt with spontaneous grunting/moaning. Unable to produce intelligible speech.  Patient goals: unable to state at this time 2' aphasia    Pain Ratin/10                   Objective:   Patient found with: NG tube, pulse ox (continuous), telemetry, oxygen    Oral Musculature  Evaluation  Oral Musculature: facial asymmetry present, right weakness  Dentition: edentulous  Mucosal Quality: good  Oral Labial Strength and Mobility: other (see comments)  Lingual Strength and Mobility: other (see comments)  Volitional Cough: unable to assess 2' global aphasia  Volitional Swallow: unable to assess 2' global aphasia  Voice Prior to PO Intake: spontaneous grunting; moaning     Bedside Swallow Eval:  Consistencies Assessed: ice chips    Oral Phase:  Pt observed with open mouthed posture; severe oral/labial weakness with nasolabial flattening on right.  Pt unable to follow commands.  Pt briefly made eye contact and attempted to localize to name.  No responsive or spontaneous speech produced.  Pt with reflexive vocalizations; grunting.  ST placed one ice chip to anterior oral cavity.  Pt grimaced and pulled away.  ST placed ice chip in anterior sulcus.  Pt allowed ice chip to melt in anterior oral cavity; noted pooled thin water in anterior sulcus, with initially no evidence of awareness, oral prep or transit.  Pt produced delayed lingual pumping as if to initiate a pharyngeal swallow; however, NO pharyngeal swallow was palpated.   Pharyngeal Phase: No pharyngeal swallow palpated.  The pt demonstrated cough post initiation of lingual pumping; some thin liquid may have fallen into open airway.  No further consistencies were attempted.                                    Assessment:  Esteban Goins is a 77 y.o. male with a medical diagnosis of CVA (cerebral vascular accident) and presents with severe oropharyngeal dysphagia, with poor oral sensation/awareness, delayed oral prep, absence of pharyngeal swallow, and s/s airway threat.  He is also suspected to present with global aphasia.  The pt is at risk for aspiration at this time; he is unable to follow commands.  Recommend continued NPO with nonoral means of nutrition at this time; ST to follow.     Do you have any cultural, spiritual, Sikh  conflicts, given your current situation?: no     Discharge recommendations:       Goals:   SLP Goals        Problem: SLP Goal    Goal Priority Disciplines Outcome   SLP Goal     SLP           1. The patient will participate in ongoing assessment of swallowing to determine safety/tolerance of any PO intake, with further recs to follow as appropriate.   2. The patient will participate in MBSS if deemed appropriate, with further results/recs to follow.       Plan:   Patient to be seen Therapy Frequency: 3 x/week   Plan of Care expires: 03/15/17  Plan of Care reviewed with: patient  SLP Follow-up?: Yes              Brie Gama, CCC-SLP  02/13/2017

## 2017-02-14 NOTE — PLAN OF CARE
Problem: SLP Goal  Goal: SLP Goal  1. The patient will participate in ongoing assessment of swallowing to determine safety/tolerance of any PO intake, with further recs to follow as appropriate.   2. The patient will participate in MBSS if deemed appropriate, with further results/recs to follow.    Comments:   Initial assessment of swallowing conducted.  Pt presents with severe oropharyngeal dysphagia and suspected global aphasia; unable to follow commands.  Rec cont'd NPO with alternate means of nutrition; ST to follow for ongoing assessment.

## 2017-02-14 NOTE — ASSESSMENT & PLAN NOTE
- Patient found down by brother, onset of symptoms unknown so presented outside thrombolysis window.  - MRI/MRA with large left MCA stroke.  - 2D echo with diastolic dysfunction  - Continue permission hypertension today with addition of oral metoprolol 25 mg bid.  - Speech/PT/OT  - Patient was on Plavix at home for uncertain reason - need records.

## 2017-02-14 NOTE — PLAN OF CARE
Discharge Planning:  Patient admitted on 2-13-17  LOS-day 1  Chart reviewed  Care plan discussed    Discussed care plan with treatment team  Discussed care plan with the attending Dr Cullen  Current dispo -pending  Case management to follow  Consults following are: neurology, PT, OT, ST, case mgt

## 2017-02-14 NOTE — ASSESSMENT & PLAN NOTE
- Speech following and recommend NPO status for now.  - Secretions evident.  - Will need to start tube feeding soon.

## 2017-02-14 NOTE — PROGRESS NOTES
Neurology follow-up note: This is a 77-year-old male who had been seen by me when admitted yesterday with complaints of intense right hemiplegia, expressive aphasia and swallowing difficulty. The patient apparently was found on the morning of admission by his brother, in bed with the above symptoms. It is reported that when he went to bed last night he had talked his brother and was reported to have no symptoms. A CT of the head showed a small 5mm low density area in the left thalamus consistent with a lacunar infarct. Chronic small vessel ischemic changes are noted. On arrival to the emergency room he was noted to be severely hypertensive (200/119).  Since admission he has been stable with this morning was noted be a little lethargic and poorly responsive but is now awake and responsive and nonverbal this afternoon.  An MRI scan of the brain, noncontrast, and MRA of the brain, showed occlusion of the left superior division of the M2 segment of the MCA with resulting large left cerebral infarct involving the left parietal, temporal, and frontal cortices.    Neurological exam: Patient is awake and responsive and does follow simple commands such as grasping my hand on the left.  He is otherwise nonverbal.  He also appears to have a right visual field deficit.  Cranial nerves show evidence of a right upper motor neuron facial weakness, and swallowing difficulty with diminished gag, and a right visual field deficit.  Neck is supple with no bruits. He has dense right hemiplegia with only minimal movements of the right lower extremity.  Good motor movements on the left.  Primary sensations are diminished in the right upper extremity but otherwise symmetrical in the lower extremities.  DTRs generally depressed to absent distally with slightly more pronounced in the right upper extremity.  Right plantar is extensor, the left is flexor.  No abnormal involuntary movements noted.     Impression: Left hemispheric MCA ischemic  infarct related to occlusion of the left superior division of them to segment.  Residual dense right hemiplegia with right visual field deficit and dysphagia/aphasia.  Risk factors include hypertension and diabetes.    Recommendations:  Continue present medical management, PT/OT, and control of blood pressures and other vascular risk factors including diabetes.  Antiplatelet agents as have been initiated.  Agree with cardiology evaluation for control of hypertension in the recent diagnosis of atrial fibrillation.  Once medically stable he would need referral for an inpatient rehabilitation program.

## 2017-02-14 NOTE — PLAN OF CARE
02/14/17 1446   Discharge Reassessment   Assessment Type Discharge Planning Reassessment   Can the patient answer the patient profile reliably? No, cognitively impaired   How does the patient rate their overall health at the present time? Fair   Describe the patient's ability to walk at the present time. Major restrictions/daily assistance from another person   How often would a person be available to care for the patient? Whenever needed   Number of comorbid conditions (as recorded on the chart) Four   Discharge Plan A Skilled Nursing Facility   Discharge Plan B Rehab   Explained to the the patient/caregiver why the discharge planned changed: Yes   Involved the patient/caregiver in establishing a new discharge plan: Yes

## 2017-02-14 NOTE — PLAN OF CARE
Problem: Patient Care Overview  Goal: Plan of Care Review  Pt  on 2L no changes  will continue to monitor.

## 2017-02-14 NOTE — SUBJECTIVE & OBJECTIVE
Interval History:  Patient sleeping heavily and difficult to awaken, but eventually wakes up on his own.  Moves his left arm and leg randomly, not really following commands but makes eye contact.  NGT and oxygen in place.  He is in atrial fibrillation with .    Review of Systems   Unable to perform ROS: Patient unresponsive     Objective:     Vital Signs (Most Recent):  Temp: 98.3 °F (36.8 °C) (02/14/17 0730)  Pulse: 110 (02/14/17 0800)  Resp: (!) 28 (02/14/17 0800)  BP: (!) 181/85 (02/14/17 0800)  SpO2: 95 % (02/14/17 0800) Vital Signs (24h Range):  Temp:  [96.5 °F (35.8 °C)-99.6 °F (37.6 °C)] 98.3 °F (36.8 °C)  Pulse:  [] 110  Resp:  [12-35] 28  SpO2:  [91 %-100 %] 95 %  BP: (143-268)/() 181/85     Weight: 83.6 kg (184 lb 4.9 oz)  Body mass index is 26.44 kg/(m^2).    Intake/Output Summary (Last 24 hours) at 02/14/17 0826  Last data filed at 02/14/17 0705   Gross per 24 hour   Intake          1814.62 ml   Output             1940 ml   Net          -125.38 ml      Physical Exam   Constitutional: He appears well-developed and well-nourished.   HENT:   Head: Normocephalic and atraumatic.   Edentulous.  Tongue protruding and appears dry.  Secretions evident in back of throat.   Eyes: Conjunctivae are normal. Pupils are equal, round, and reactive to light.   Squints his left eye.   Neck: Normal range of motion. Neck supple. No thyromegaly present.   Cardiovascular:   Irregularly irregular rhythm with .   Pulmonary/Chest: He has no wheezes. He has no rales.   Breathing unlabored, unable to take a deep breath on command.  Coarse upper airway noise.   Abdominal: Soft. Bowel sounds are normal. He exhibits distension. There is no tenderness.   Musculoskeletal:   Left hand deformed with only the thumb and 3rd digit remaining, heavily scarred.  Has trace of lower extremity edema right leg (dependent).   Neurological: He is alert.   Paralyzed on the right.  When asked to push down with left leg he moves  his toes but is unable to follow specific directions.  Slight grasp with left hand on command.   Skin: Skin is warm and dry.       Significant Labs: All pertinent labs within the past 24 hours have been reviewed.    Significant Imaging: I have reviewed all pertinent imaging results/findings within the past 24 hours.

## 2017-02-14 NOTE — PT/OT/SLP EVAL
"Physical Therapy  Evaluation and Treatment    Esteban Goins   MRN: [id number]   Admitting Diagnosis: Acute ischemic left MCA stroke    PT Received On: 02/14/17  PT Start Time: 1134     PT Stop Time: 1156    PT Total Time (min): 22 min       Billable Minutes:  Evaluation 12 and Neuromuscular Re-education 8    Diagnosis: Acute ischemic left MCA stroke      Past Medical History   Diagnosis Date    Coronary artery disease     Diabetes mellitus     Hypertension     MI (myocardial infarction)       History reviewed. No pertinent past surgical history.    Referring physician: Edgar  Date referred to PT: 2/13/17    General Precautions: Standard, aspiration, fall  Orthopedic Precautions: N/A   Braces: N/A       Do you have any cultural, spiritual, Hinduism conflicts, given your current situation?: unable to assess; pt nonverbal    Patient History:  Living Environment Comment: Pt unable to provide history due to aphasia. PT telephone sister Petra ([phone]) who reported that the patient was living alone up until three months ago when he decided (after much encouragement from other siblings) to move in with his brother so that the patient's brother could assist the patient with diabetes management and transportation. Pt has been living with his brother in a trailer with 3 steps to enter and bilateral hand rails. There is a tub/shower, unsure if it has a grab bar. The patient was independent with mobility, ADLs, and IALDs such as cooking and cleaning, however did not drive. He ejoys going to truck stops and the casino. Sister denied h/o falls. 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   Equipment Currently Used at Home: none  DME owned (not currently used): none    Previous Level of Function:  Ambulation Skills: independent  Transfer Skills: independent  ADL Skills: independent  Work/Leisure Activity: " independent    Subjective:  Communicated with nurse prior to session.  Pt did not make any verbalizations during session. Occasional eye contact and movement with L UE, however gesturing appeared non-purposeful. He did not shake/nod his head in response to questions.     Chief Complaint: no complaints during session/non-verbal  Patient goals: non stated    Pain Rating:  (Unable to assess due to aphasia. No signs of pain before or after session.)     Objective:    Pt found L sidelying in bed with HOB elevated.      Cognitive Exam:  Oriented to: Non-verbal    Follows Commands/attention: Unable to follow verbal commands, even with assist of visual, tactile, or demonstrative cues.   Communication: global aphasia per SLP 2/13/17  Safety awareness/insight to disability: impaired    Physical Exam:  Postural examination/scapula alignment/tone: Flaccid R UE and R LE, R lateral lean in sitting    Skin integrity: Visible skin intact  Edema: None noted    Sensation:   Unable to formally assess    Reflexed: (+) Extensor plantar reflex with Babinski test at R LE    Upper Extremity Range of Motion:  Right Upper Extremity: PROM WFL, No AROM observed  Left Upper Extremity: WFL except deformity at hand    Upper Extremity Strength:  Right Upper Extremity: Unable to formally test, however appears flaccid; 0/5  Left Upper Extremity: Unable to formally test    Lower Extremity Range of Motion:  Right Lower Extremity: PROM WFL, No AROM observed  Left Lower Extremity: PROM WFL, AROM observed at toes and ankle only    Lower Extremity Strength:  Right Lower Extremity: 0/5; flaccid  Left Lower Extremity: Unable to follow commands for MMT, >3/5 ankle       Functional Mobility:  Bed Mobility:  Scooting/Bridging: Total Assistance, With assist of 2  Supine to Sit: Total Assistance  Sit to Supine: Total Assistance    Transfers:  Sit <> Stand Assistance:  (Unable to perform)      Balance:   Static Sit: Required constant total assist at trunk for  stability; tendency for R lateral lean, forward head throughout, assist at head and neck for posture/alignment      AM-PAC 6 CLICK MOBILITY  How much help from another person does this patient currently need?   1 = Unable, Total/Dependent Assistance  2 = A lot, Maximum/Moderate Assistance  3 = A little, Minimum/Contact Guard/Supervision  4 = None, Modified [place]/Independent    Turning over in bed (including adjusting bedclothes, sheets and blankets)?: 1  Sitting down on and standing up from a chair with arms (e.g., wheelchair, bedside commode, etc.): 1  Moving from lying on back to sitting on the side of the bed?: 1  Moving to and from a bed to a chair (including a wheelchair)?: 1  Need to walk in hospital room?: 1  Climbing 3-5 steps with a railing?: 1  Total Score: 6     AM-PAC Raw Score CMS G-Code Modifier Level of Impairment Assistance   6 % Total / Unable   7 - 9 CM 80 - 100% Maximal Assist   10 - 14 CL 60 - 80% Moderate Assist   15 - 19 CK 40 - 60% Moderate Assist   20 - 22 CJ 20 - 40% Minimal Assist   23 CI 1-20% SBA / CGA   24 CH 0% Independent/ Mod I     Patient left supine with all lines intact, call button in reach, bed alarm on, nurse notified and nurse present. Floating heels.     Assessment:   Esteban Goins is a 77 y.o. male with a medical diagnosis of Acute ischemic left MCA stroke. PT evaluation completed. Pt requires total assist for rolling, supine<>sit, and static sitting balance at edge of bed approximately 8<>10 minutes with tendency for R lateral lean. No purposeful movements noted in R UE nor R LE. Did not observe patient follow any verbal commands during session, nor make any verbalizations. Occasional yawning, hiccups and coughing noted, however non-productive, minimal oral secretions with [place]er obtained. Prior to admission pt was modified independent with mobility, self-care, and IADLs including cooking and going to the casino.  Pt is at high risk of unplanned readmission  due to fall risk, inability to self-administer medication, and lack of 24 hour caregiver in prior setting.      Rehab identified problem list/impairments: Rehab identified problem list/impairments: weakness, impaired self care skills, impaired functional mobilty, decreased lower extremity function, decreased upper extremity function, impaired coordination, decreased ROM, impaired cognition, abnormal tone, impaired balance    Rehab potential is good.    Activity tolerance: Fair    Discharge recommendations: Discharge Facility/Level Of Care Needs: rehabilitation facility (pending progress and medical stability)     Barriers to discharge: Barriers to Discharge: Decreased caregiver support, Inaccessible home environment (based on current functional level)    Equipment recommendations:       GOALS:   Physical Therapy Goals        Problem: Physical Therapy Goal    Goal Priority Disciplines Outcome Goal Variances Interventions   Physical Therapy Goal     PT/OT, PT Ongoing (interventions implemented as appropriate)     Description:  Goals to be met by: 3/14/17     Patient will increase functional independence with mobility by performin. Supine to sit with min A.  2. Sit to supine with min A.   3. Rolling R and L with min A.   4. Sitting at edge of bed >5 minutes with min A.   5. PT will assess sit<>stand.                 PLAN:    Patient to be seen daily to address the above listed problems via gait training, therapeutic activities, therapeutic exercises, neuromuscular re-education  Plan of Care expires: 17  Plan of Care reviewed with: patient          Nely Ron, PT  2017

## 2017-02-14 NOTE — PLAN OF CARE
Problem: Occupational Therapy Goal  Goal: Occupational Therapy Goal  Goals to be met by: 2/24/2017    Patient will increase functional independence with ADLs by performing:    Grooming while bedlevel with Moderate Assistance.  Sitting at edge of bed x20 minutes with Stand-by Assistance.  Supine to sit with Maximum Assistance.  Stand pivot transfers assessment without distress.  Increased functional strength to 2/5 for RUE.  RUE Upper extremity exercise program 10 reps per handout, with assistance as needed.  Outcome: Ongoing (interventions implemented as appropriate)  Pt. Limited by impaired cognition especially poor with following simple command and initiation,RUE and RLE flaccidity, expressive aphasia, R visual field deficits,  Midline orientation, impaired siting and standing balance limiting indep and safety in ADLS and fx mobility.  Pt. Performed bed mobility with total assist forNDT segmental kiersten Marty side and supine<>Sit; sat EOB 5 min with min assist and moments of midline sitting posture with SBA but reverted back to LUE pushing to affected side (right side).  Pt. Returned to supine with total assist but did follow simple command to lower self onto elbow.  Grooming total assist with [place]; pt. avoids mouth hygiene  With oral swab; noxious topt as he demonstrated with facial grimacing and moving head from side to side to avoid swab being placed in his mouth. RUE PROM x 10 reps all planes supine in bed; no pain; no resistance in shoulder. Dep of 2 to pull pt up to HOB using drawsheet. IPR recommended.  Pt. Was indep PLOF.  Continue with OT POC.

## 2017-02-14 NOTE — PLAN OF CARE
Problem: Patient Care Overview  Goal: Plan of Care Review  Outcome: Ongoing (interventions implemented as appropriate)  Recommendations  1. Consider Diabetisource via NG or OG, initiate at 10 ml/hr increase by 10 ml/hr until final goal rate of 45ml/hr is reached. This will provide 1296kcal (83%EEN), 65g pro (100%EEN), 108g CHO, 883ml water. Water flushes per MD. Hold for N/V/residuals>400ml.   2. When medically able advance oral diet to Diabetic 1800 kcal, cardiac, low Na, low chol , texture per SLP.   3. RD to monitor  Goals: provide nutrition within 48 hrs  Nutrition Goal Status: new  Communication of RD Recs: other (comment) (sticky note)     Continuum of Care Plan  D/C planning: unable to determine at this time

## 2017-02-14 NOTE — NURSING
Pt resting in bed. Pt turned Q2, as he is only able to make minimal adjustments in his positioning. He is currently nonverbal, only making grunting noises. He does not follow commands. He does appear alert however and engages in eye contact. Will continue to monitor.

## 2017-02-14 NOTE — ASSESSMENT & PLAN NOTE
- Patient on glipizide at home - SSI while here.  - Will probably need to be started on insulin as appears to be poorly controlled.  - HbA1c and records pending.

## 2017-02-14 NOTE — ASSESSMENT & PLAN NOTE
- On lisinopril 40 mg daily at home.  - Continue to hold this today while starting oral metoprolol and atrial fibrillation.

## 2017-02-14 NOTE — CONSULTS
[facility]  Adult Nutrition  Consult Note    SUMMARY     Recommendations  1. Consider Diabetisource via NG or OG, initiate at 10 ml/hr increase by 10 ml/hr until final goal rate of 45ml/hr is reached. This will provide 1296kcal (83%EEN), 65g pro (100%EEN), 108g CHO, 883ml water. Water flushes per MD. Hold for N/V/residuals>400ml.   2. When medically able advance oral diet to Diabetic 1800 kcal, cardiac, low Na, low chol , texture per SLP.   3. RD to monitor  Goals: provide nutrition within 48 hrs  Nutrition Goal Status: new  Communication of RD Recs: other (comment) (sticky note)    Continuum of Care Plan  D/C planning: unable to determine at this time     Reason for Assessment  Reason for Assessment: physician consult (assess dietary needs)  Diagnosis: stroke/CVA  Relevent Medical History: Dm 2, CKD III, HTN   Interdisciplinary Rounds: did not attend    Nutrition Prescription Ordered  Current Diet Order: NPO      Evaluation of Received Nutrients/Fluid Intake  IV Fluid (mL): 3000    Nutrition Risk Screen   Nutrition Risk Screen: no indicators present    Nutrition/Diet History  Factors Affecting Nutritional Intake: NPO, difficulty/impaired swallowing    Labs/Tests/Procedures/Meds  Pertinent Labs Reviewed: reviewed  Pertinent Labs Comments: POCT gluc 220-259, BUN 27, Cr 2.0, Trig 397, .6, HDL 28  Pertinent Medications Reviewed: reviewed  Pertinent Medications Comments: atorvastatin, insulin (prn)    Physical Findings  Overall Physical Appearance: overweight  Skin: intact    Anthropometrics  Height (inches): 70 in  Weight Method: Bed Scale  Weight (kg): 83.6 kg  Ideal Body Weight (IBW), Male: 166 lb  % Ideal Body Weight, Male (lb): 111.03 lb  BMI (kg/m2): 26.44  BMI Grade: 25 - 29.9 - overweight       Estimated/Assessed Needs  Weight Used For Calorie Calculations: 83.6 kg (184 lb 4.9 oz)   Height (cm): 177.8 cm  Energy Need Method: [place]- Philippor  Estimated energy needs: 1562-1718kcal/day  (no AF-X1.1)  RMR (Assumption-St. Philippor Equation): 1562.73  Weight Used For Protein Calculations: 83.6 kg (184 lb 4.9 oz)  Protein Requirements: 50-67g/d  0.6 gm Protein (gm): 50.26 and 0.8 gm Protein (gm): 67.02  Fluid Need Method: RDA Method (1 ml per kcal or per MD)    Estimated CHO needs:  195g/day    Nutrition Diagnosis:  Problem: Inadequate energy intake  Etiology: CVA, dysphagia  Symptoms: NPO  Status: new    Monitor and Evaluation  Food and Nutrient Intake: energy intake, food and beverage intake, enteral nutrition intake  Food and Nutrient Adminstration: diet order, enteral and parenteral nutrition administration  Physical Activity and Function: nutrition-related ADLs and IADLs  Anthropometric Measurements: weight, weight change  Biochemical Data, Medical Tests and Procedures: electrolyte and renal panel, gastrointestinal profile, glucose/endocrine profile, inflammatory profile, lipid profile  Nutrition-Focused Physical Findings: overall appearance    Nutrition Risk    Level of Risk: moderate, follow up twice weekly    Nutrition Follow-Up  yes       Assessment and Plan    No new Assessment & Plan notes have been filed under this hospital service since the last note was generated.  Service: Nutrition

## 2017-02-14 NOTE — ASSESSMENT & PLAN NOTE
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  - Patient on allopurinol for likely hyperuricemia  - Proteinuria with diabetes as contributor.  - Lisinopril held for now but will resume when permissive hypertension period is over.  - Non urgent consult to nephrology as this appears chronic.

## 2017-02-14 NOTE — PROGRESS NOTES
2341 Dr. Maher notified that pt's breath sounds more course. Fluids dc and CXR ordered per MD order. SPO2 stable. Will monitor.

## 2017-02-14 NOTE — PLAN OF CARE
Problem: Physical Therapy Goal  Goal: Physical Therapy Goal  Goals to be met by: 3/14/17     Patient will increase functional independence with mobility by performin. Supine to sit with min A.  2. Sit to supine with min A.   3. Rolling R and L with min A.   4. Sitting at edge of bed >5 minutes with min A.   5. PT will assess sit<>stand.   Outcome: Ongoing (interventions implemented as appropriate)  PT evaluation completed. Pt requires total assist for rolling, supine<>sit, and static sitting balance at edge of bed approximately 8<>10 minutes with tendency for R lateral lean. No purposeful movements noted in R UE nor R LE. Did not observe patient follow any verbal commands during session, nor make any verbalizations. Occasional yawning, hiccups and coughing noted, however non-productive, minimal oral secretions with [place] obtained. Will continue to follow and progress as tolerated. Please see progress note for detailed plan of care and recommendations.

## 2017-02-14 NOTE — ASSESSMENT & PLAN NOTE
- Patient in NSR on arrival but in atrial fibrillation currently  - On amiodarone drip and oral metoprolol added this morning.  - Consult cardiologist.

## 2017-02-14 NOTE — PROGRESS NOTES
Completed MRI checklist eligibility with pt's sister; answered to the best of her ability. Dr. Hernández aware and wants to continue with MRI tonight.

## 2017-02-14 NOTE — NURSING
1930 Brought pt down for MRI without contrast. Pt tolerated MRI with no distress.  2230 Pt transferred back to ICU from MRI. VSS. MRI read by radiologist.   0030 Pt converted to A fib (rate 130-150s). Pt in no apparent distress. EICU notified of rhythm change. EKG obtained. New orders to start patient on Amiodarone.  0145 Pt on Amio drip that will infuse until 0745.

## 2017-02-15 NOTE — ASSESSMENT & PLAN NOTE
- Patient on glipizide at home - SSI while here and start Levemir 5 units qhs.  - Increase long acting as needed  - HbA1c 9.7 and sister reports he was noncompliant with diabetes treatment.

## 2017-02-15 NOTE — PLAN OF CARE
Problem: Physical Therapy Goal  Goal: Physical Therapy Goal  Goals to be met by: 3/14/17     Patient will increase functional independence with mobility by performin. Supine to sit with min A.  2. Sit to supine with min A.   3. Rolling R and L with min A.   4. Sitting at edge of bed >5 minutes with min A.   5. PT will assess sit<>stand.    Outcome: Ongoing (interventions implemented as appropriate)  Pt required 1-2 person assist for activities sitting at edge of bed. Eyes open throughout session however difficulty attending to tasks. Will continue to follow and progress as tolerated. Please see progress note for detailed plan of care and recommendations.

## 2017-02-15 NOTE — PT/OT/SLP PROGRESS
Occupational Therapy  Treatment    Esteban Goins   MRN: [id number]   Admitting Diagnosis: Acute ischemic left MCA stroke    OT Date of Treatment: 02/15/17   OT Start Time: 1352  OT Stop Time: 1430  OT Total Time (min): 38 min    Billable Minutes:  Self Care/Home Management 8, Therapeutic Activity 14 and Neuromuscular Re-education 8    General Precautions: Standard,  (aspiration and fall risk)  Orthopedic Precautions: N/A  Braces: N/A    Do you have any cultural, spiritual, Zoroastrianism conflicts, given your current situation?: none stated -pt nonverbal    Subjective:  Communicated with nursing prior to session.  Pt non-verbal however, pt was not grimacing with activity pt initiated LUE hand movement at the end of session demonstrating want to communicate.     Pain Ratin/10 (Pt demonstrated no grimacing or indicators of pain)              Pain Rating Post-Intervention: 0/10    Objective:  Patient found with: telemetry, NG tube, oxygen, ward catheter     Functional Mobility:  Bed Mobility:  Scooting/Bridging: Total Assistance (with max A 1 person at trunk and 2nd person assisting with scooting at hips and LE's)  Supine to Sit: Total Assistance, With assist of 2  Sit to Supine: Total Assistance, With assist of 2        Activities of Daily Living:               Pt tolerated mouth swab better this session. He reached and held swab with LUE however required total assistance for initiation and completion of swabbing mouth. Pt required PT for sitting balance and tolerance with task.     Balance:   Static Sit: 0: Needs MAXIMAL assist to maintain sitting without back support  Dynamic Sit: POOR: N/A      Therapeutic Activities and Exercises:  Pt required total assist x2 persons with bed mobility, scooting and bridging, weight shifting and EOB tasks. Weight shifting on/off affected extremity as well as support and tactile impute at elbow and scapula of RUE. Spherical object placed under R hand for increased input and light  pressure/input was used during EOB activities. Pt attended visually to R side briefly when tactile and physically directed crossing midline towards LUE to R knee/thigh.     AM-PAC 6 CLICK ADL   How much help from another person does this patient currently need?   1 = Unable, Total/Dependent Assistance  2 = A lot, Maximum/Moderate Assistance  3 = A little, Minimum/Contact Guard/Supervision  4 = None, Modified [place]/Independent    Putting on and taking off regular lower body clothing? : 1  Bathing (including washing, rinsing, drying)?: 1  Toileting, which includes using toilet, bedpan, or urinal? : 1  Putting on and taking off regular upper body clothing?: 1  Taking care of personal grooming such as brushing teeth?: 1  Eating meals?: 1  Total Score: 6     AM-PAC Raw Score CMS G-Code Modifier Level of Impairment Assistance   6 % Total / Unable   7 - 9 CM 80 - 100% Maximal Assist   10 - 14 CL 60 - 80% Moderate Assist   15 - 19 CK 40 - 60% Moderate Assist   20 - 22 CJ 20 - 40% Minimal Assist   23 CI 1-20% SBA / CGA   24 CH 0% Independent/ Mod I     Patient left supine with call button in reach and nursing notified    ASSESSMENT:  Esteban Goins is a 77 y.o. male with a medical diagnosis of Acute ischemic left MCA stroke Pt continues to presented with decreased initiation, following of commands, attention to R field, sitting balance and tolerance. He tolerated sitting EOB with support at trunk and input at scapula and following down RLE.Pt required total assist x2 persons with bed mobility, scooting and bridging, weight shifting and EOB tasks. Weight shifting on/off affected extremity as well as support and tactile impute at elbow and scapula of RUE. Spherical object placed under R hand for increased input and light pressure/input was used during EOB activities. Pt attended visually to R side briefly when tactile and physically directed crossing midline towards LUE to R knee/thigh. Pt would benefit from  skilled occupational therapy intervention for increased activity tolerance and independence in ADL's.    Rehab identified problem list/impairments: Rehab identified problem list/impairments: weakness, impaired self care skills, impaired balance, decreased coordination, decreased safety awareness, decreased ROM, impaired coordination, visual deficits, decreased upper extremity function, decreased lower extremity function, impaired cognition, impaired functional mobilty, gait instability    Rehab potential is good.    Activity tolerance: Good    Discharge recommendations: Discharge Facility/Level Of Care Needs: rehabilitation facility     Barriers to discharge: Barriers to Discharge: Inaccessible home environment, Decreased caregiver support    Equipment recommendations:  (TBD)     GOALS:   Occupational Therapy Goals        Problem: Occupational Therapy Goal    Goal Priority Disciplines Outcome Interventions   Occupational Therapy Goal     OT, PT/OT Ongoing (interventions implemented as appropriate)    Description:  Goals to be met by: 2/24/2017    Patient will increase functional independence with ADLs by performing:    Grooming while bedlevel with Moderate Assistance.  Sitting at edge of bed x20  minutes with Stand-by Assistance.  Supine to sit with Maximum Assistance.  Stand pivot transfers assessment without distress.  Increased functional strength to 2/5 for RUE.  RUE Upper extremity exercise program  10 reps per handout, with assistance as needed.                Plan:  Patient to be seen 6 x/week to address the above listed problems via self-care/home management, therapeutic activities, therapeutic exercises, neuromuscular re-education, cognitive retraining  Plan of Care expires: 03/14/17  Plan of Care reviewed with: patient         Efraínkerri RUIZ Aristeo, OT  02/15/2017

## 2017-02-15 NOTE — PROGRESS NOTES
[facility] Hospital Medicine  Progress Note    Patient Name: Esteban Goins  MRN: [id number]  Patient Class: IP- Inpatient   Admission Date: 2/13/2017  Length of Stay: 2 days  Attending Physician: Cristy Cullen MD  Primary Care Provider: [facility]        Subjective:     Principal Problem:Acute ischemic left MCA stroke    HPI:  Mr. Goins is a 77 year old man who was found by his brother with aphasia and right hemiparesis, was brought to [facility] and found to have severe hypertension and evidence of a stroke.  He was transferred to [facility] for a higher level of care.  On initial evaluation the patient was nonverbal and unable to follow commands.  Right arm showed flaccid paralysis and right leg withdrew to painful stimuli.  Initial labs showed likely stage IV CKD with proteinuria and uncontrolled diabetes.      Hospital Course:  Patient was admitted to the ICU and was seen by the neurologist.  MRI/MRA showed an occlusion of the left superior division of a segment of the MCA resulting in a large left cerebral infarction involving the left parietal, temporal and frontal cortices.  Permissive hypertension was recommended for 24-48 hours.  He was unable to speak or swallow when evaluated by Speech and an NGT was placed.  Overnight he developed atrial fibrillation with a rapid ventricular response and was started on an amiodarone drip.                Interval History:  Patient remains nonverbal and neglects his right side.  Makes eye contact but is unable to respond to yes/no questions.  Tries to gesture with left hand but does not seem purposeful.      Spoke to patient's sister Petra who reports he has 12 siblings and 2 daughters.  He is not  and his daughters live elsewhere.  She believes he has not contacted them in a long time and she has been unable to reach them.  One lives in [place] and the other in [place].  She reports he had  either an MI or a stroke 2 years ago and was in a nursing home for about a month.  She is pretty sure he does not take his medications for diabetes and HTN regularly.  She was not aware of chronic kidney disease.    Review of Systems   Unable to perform ROS: Patient nonverbal     Objective:     Vital Signs (Most Recent):  Temp: 98.8 °F (37.1 °C) (02/15/17 1202)  Pulse: 104 (02/15/17 1302)  Resp: (!) 26 (02/15/17 1302)  BP: (!) 180/80 (02/15/17 1302)  SpO2: 99 % (02/15/17 1302) Vital Signs (24h Range):  Temp:  [98.2 °F (36.8 °C)-99.5 °F (37.5 °C)] 98.8 °F (37.1 °C)  Pulse:  [] 104  Resp:  [11-35] 26  SpO2:  [93 %-100 %] 99 %  BP: (133-234)/() 180/80     Weight: 82.4 kg (181 lb 10.5 oz)  Body mass index is 26.07 kg/(m^2).    Intake/Output Summary (Last 24 hours) at 02/15/17 1318  Last data filed at 02/15/17 1000   Gross per 24 hour   Intake          1322.01 ml   Output              865 ml   Net           457.01 ml      Physical Exam   Constitutional: He appears well-developed and well-nourished.   HENT:   Head: Normocephalic and atraumatic.   Edentulous.  Tongue appears dry.  Secretions evident in back of throat.   Eyes: Conjunctivae are normal. Pupils are equal, round, and reactive to light.   Squints his left eye.   Neck: Normal range of motion. Neck supple. No thyromegaly present.   Cardiovascular:   Irregularly irregular rhythm with .   Pulmonary/Chest: He has no wheezes. He has no rales.   Breathing unlabored, unable to take a deep breath on command.  Coarse upper airway noise.   Abdominal: Soft. Bowel sounds are normal. He exhibits no distension. There is no tenderness.   Musculoskeletal:   Left hand deformed with only the thumb and 3rd digit remaining, heavily scarred.  Has trace of lower extremity edema right leg (dependent).   Neurological: He is alert.   Paralyzed on the right.  When asked to push down with left leg he moves his toes but is unable to follow specific directions.  Slight  grasp with left hand on command.   Skin: Skin is warm and dry.       Significant Labs: All pertinent labs within the past 24 hours have been reviewed.    Significant Imaging: I have reviewed all pertinent imaging results/findings within the past 24 hours.    Assessment/Plan:      * Acute ischemic left MCA stroke  - Patient found down by brother, presented outside thrombolysis window.  - MRI/MRA with large left MCA stroke.  - 2D echo with diastolic dysfunction  - Increase metoprolol dose today and add back lisinopril at 20 mg daily  - Continue speech/PT/OT      Hemiparesis, right  - Due to left MCA stroke.  - Continue therapy  - IP Rehab recommended.      Oral phase dysphagia  - Speech following and recommend NPO status with initiation of tube feeding today.  - Secretions evident.  - Consult GI for PEG - hopefully this will not be permanent and he can regain some function in rehab.    Aphasia  - Expressive and receptive.  - Patient is unable to speak or follow commands but makes eye contact.      HTN (hypertension), malignant  - On lisinopril 40 mg daily at home - add back 20 mg daily.  - Increase metoprolol.    Iron deficiency anemia  - Appears to have iron deficiency   - Iron studies show low iron stores with normal TIBC  - Stools for occult blood pending.  - Likely element of anemia due to CKD as well       CKD (chronic kidney disease), stage III  - Chronic kidney disease stage III-IV with uncertain baseline.  - Patient on allopurinol for likely hyperuricemia  - Proteinuria with diabetes as contributor.  - Lisinopril resumed at 20 mg daily - follow renal function closely on this.  - Consult nephrology for any worsening.      Type 2 diabetes mellitus with diabetic chronic kidney disease  - Patient on glipizide at home - SSI while here and start Levemir 5 units qhs.  - Increase long acting as needed  - HbA1c 9.7 and sister reports he was noncompliant with diabetes treatment.      Paroxysmal atrial fibrillation  -  Patient in NSR on arrival but in rapid atrial fibrillation currently  - Not improving on amiodarone drip and metoprolol 25 mg bid.  - Increase beta blocker to 50 mg tid and consult cardiologist.      VTE Risk Mitigation         Ordered     heparin (porcine) injection 5,000 Units  Every 8 hours     Route:  Subcutaneous        02/13/17 1421     Medium Risk of VTE  Once      02/13/17 1421     Place sequential compression device  Until discontinued      02/13/17 1421          Cristy Maher MD  Department of Hospital Medicine   [facility]

## 2017-02-15 NOTE — SUBJECTIVE & OBJECTIVE
Interval History:  Patient remains nonverbal and neglects his right side.  Makes eye contact but is unable to respond to yes/no questions.  Tries to gesture with left hand but does not seem purposeful.      Spoke to patient's sister Petra who reports he has 12 siblings and 2 daughters.  He is not  and his daughters live elsewhere.  She believes he has not contacted them in a long time and she has been unable to reach them.  One lives in [place] and the other in [place].  She reports he had either an MI or a stroke 2 years ago and was in a nursing home for about a month.  She is pretty sure he does not take his medications for diabetes and HTN regularly.  She was not aware of chronic kidney disease.    Review of Systems   Unable to perform ROS: Patient nonverbal     Objective:     Vital Signs (Most Recent):  Temp: 98.8 °F (37.1 °C) (02/15/17 1202)  Pulse: 104 (02/15/17 1302)  Resp: (!) 26 (02/15/17 1302)  BP: (!) 180/80 (02/15/17 1302)  SpO2: 99 % (02/15/17 1302) Vital Signs (24h Range):  Temp:  [98.2 °F (36.8 °C)-99.5 °F (37.5 °C)] 98.8 °F (37.1 °C)  Pulse:  [] 104  Resp:  [11-35] 26  SpO2:  [93 %-100 %] 99 %  BP: (133-234)/() 180/80     Weight: 82.4 kg (181 lb 10.5 oz)  Body mass index is 26.07 kg/(m^2).    Intake/Output Summary (Last 24 hours) at 02/15/17 1318  Last data filed at 02/15/17 1000   Gross per 24 hour   Intake          1322.01 ml   Output              865 ml   Net           457.01 ml      Physical Exam   Constitutional: He appears well-developed and well-nourished.   HENT:   Head: Normocephalic and atraumatic.   Edentulous.  Tongue appears dry.  Secretions evident in back of throat.   Eyes: Conjunctivae are normal. Pupils are equal, round, and reactive to light.   Squints his left eye.   Neck: Normal range of motion. Neck supple. No thyromegaly present.   Cardiovascular:   Irregularly irregular rhythm with .   Pulmonary/Chest: He has no wheezes. He has no rales.   Breathing  unlabored, unable to take a deep breath on command.  Coarse upper airway noise.   Abdominal: Soft. Bowel sounds are normal. He exhibits no distension. There is no tenderness.   Musculoskeletal:   Left hand deformed with only the thumb and 3rd digit remaining, heavily scarred.  Has trace of lower extremity edema right leg (dependent).   Neurological: He is alert.   Paralyzed on the right.  When asked to push down with left leg he moves his toes but is unable to follow specific directions.  Slight grasp with left hand on command.   Skin: Skin is warm and dry.       Significant Labs: All pertinent labs within the past 24 hours have been reviewed.    Significant Imaging: I have reviewed all pertinent imaging results/findings within the past 24 hours.

## 2017-02-15 NOTE — PLAN OF CARE
Problem: Patient Care Overview  Goal: Plan of Care Review  Outcome: Ongoing (interventions implemented as appropriate)  Pt free from falls and injury, free from skin breakdown. Tube feeds to start today per NGT. GI consulted for PEG placement. Plan of care discussed with pt;s sister Petra who states that she will be here Friday. Pt attempting to communicate use grunting and hand gestures. BP elevated throughout shift, however, this has resolved since home meds restarted.

## 2017-02-15 NOTE — PROGRESS NOTES
1300 Md aware of pt's current bp. Orders to not admin lobetolol at this time. Will monitor and admin bp meds when ordered.  1730 Dibetascource AC initiated per order at a rate of 45ml/hr. NGT placement verified prior to initiating feeds.

## 2017-02-15 NOTE — PLAN OF CARE
Problem: Occupational Therapy Goal  Goal: Occupational Therapy Goal  Goals to be met by: 2/24/2017    Patient will increase functional independence with ADLs by performing:    Grooming while bedlevel with Moderate Assistance.  Sitting at edge of bed x20 minutes with Stand-by Assistance.  Supine to sit with Maximum Assistance.  Stand pivot transfers assessment without distress.  Increased functional strength to 2/5 for RUE.  RUE Upper extremity exercise program 10 reps per handout, with assistance as needed.   Outcome: Ongoing (interventions implemented as appropriate)  Pt continues to presented with decreased initiation, following of commands, attention to R field, sitting balance and tolerance. He tolerated sitting EOB with support at trunk and input at scapula and following down RLE.Pt required total assist x2 persons with bed mobility, scooting and bridging, weight shifting and EOB tasks. Weight shifting on/off affected extremity as well as support and tactile impute at elbow and scapula of RUE. Spherical object placed under R hand for increased input and light pressure/input was used during EOB activities. Pt attended visually to R side briefly when tactile and physically directed crossing midline towards LUE to R knee/thigh. Pt would benefit from skilled occupational therapy intervention for increased activity tolerance and independence in ADL's.

## 2017-02-15 NOTE — ASSESSMENT & PLAN NOTE
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  - Patient on allopurinol for likely hyperuricemia  - Proteinuria with diabetes as contributor.  - Lisinopril resumed at 20 mg daily - follow renal function closely on this.  - Consult nephrology for any worsening.

## 2017-02-15 NOTE — ASSESSMENT & PLAN NOTE
- Patient in NSR on arrival but in rapid atrial fibrillation currently  - Not improving on amiodarone drip and metoprolol 25 mg bid.  - Increase beta blocker to 50 mg tid and consult cardiologist.

## 2017-02-15 NOTE — ASSESSMENT & PLAN NOTE
- Appears to have iron deficiency   - Iron studies show low iron stores with normal TIBC  - Stools for occult blood pending.  - Likely element of anemia due to CKD as well

## 2017-02-15 NOTE — PT/OT/SLP PROGRESS
Physical Therapy  Treatment    Esteban Goins   MRN: [id number]   Admitting Diagnosis: Acute ischemic left MCA stroke    PT Received On: 02/15/17  PT Start Time: 1352     PT Stop Time: 1430    PT Total Time (min): 38 min       Billable Minutes:  Therapeutic Exercise 10 and Neuromuscular Re-education 25    Treatment Type: Treatment  PT/PTA: PT     PTA Visit Number: 0       General Precautions: Standard,  (aspiration and fall risk)  Orthopedic Precautions: N/A   Braces: N/A    Do you have any cultural, spiritual, Yazdanism conflicts, given your current situation?: unable to assess; pt nonverbal    Subjective:  Communicated with nurse prior to session.  Pt non-verbal throughout session, although returned following session to assist nurse with draw sheeting patient to HOB and pt seemed to attempt to verbalize, although not intelligible. Some gesturing with L UE but unclear.     Did not respond to questions regarding pain before or after session.     Objective:    Pt found supine in bed with HOB elevated.     Functional Mobility:  Bed Mobility:   Rolling/Turning to Left: Total assistance  Rolling/Turning Right: Total assistance  Scooting/Bridging: With assist of 2, Total Assistance  Supine to Sit: Total Assistance, With assist of 2  Sit to Supine: Total Assistance, With assist of  2    Transfers:  Sit <> Stand Assistance:  (Unable to perform)    Balance:   Static Sit: Pt required continuous total assist at trunk due to R lateral lean, although occasional pushing R laterally with L UE.   Dynamic Sit: Total assist 1-2 person for trunk shift, scooting hips, and activities in sitting (reaching and grasping with L UE and placement of R UE on bedside table while in sitting at edge of bed. Placed pillow under L janet UE for increasing proprioceptive input through axis of upper arm as well as manual/tactile input at shoulder and scapula. With OT at trunk, PT performed R knee extension and proprioceptive input axillary through thigh,  then PT performed R ankle dorsiflexion and proprioceptive input through axis of distal LE. Assisted with L UE placement on R knee with verbal and tactile cues for attention to R LE.    AM-PAC 6 CLICK MOBILITY  How much help from another person does this patient currently need?   1 = Unable, Total/Dependent Assistance  2 = A lot, Maximum/Moderate Assistance  3 = A little, Minimum/Contact Guard/Supervision  4 = None, Modified [place]/Independent    Turning over in bed (including adjusting bedclothes, sheets and blankets)?: 1  Sitting down on and standing up from a chair with arms (e.g., wheelchair, bedside commode, etc.): 1  Moving from lying on back to sitting on the side of the bed?: 1  Moving to and from a bed to a chair (including a wheelchair)?: 1  Need to walk in hospital room?: 1  Climbing 3-5 steps with a railing?: 1  Total Score: 6    AM-PAC Raw Score CMS G-Code Modifier Level of Impairment Assistance   6 % Total / Unable   7 - 9 CM 80 - 100% Maximal Assist   10 - 14 CL 60 - 80% Moderate Assist   15 - 19 CK 40 - 60% Moderate Assist   20 - 22 CJ 20 - 40% Minimal Assist   23 CI 1-20% SBA / CGA   24 CH 0% Independent/ Mod I     Patient left bed in chair with all lines intact, call button in reach, bed alarm on and nurse notified.    Assessment:  Esteban Goins is a 77 y.o. male with a medical diagnosis of Acute ischemic left MCA stroke. Pt required 1-2 person assist for activities sitting at edge of bed. Eyes open throughout session however difficulty attending to tasks. Pt would benefit from continued skilled PT to maximize independence and safety with functional mobility.      Rehab identified problem list/impairments: Rehab identified problem list/impairments: weakness, impaired self care skills, impaired functional mobilty, decreased lower extremity function, decreased upper extremity function, impaired coordination, decreased ROM, impaired cognition, abnormal tone, impaired balance    Rehab  potential is good.    Activity tolerance: Fair    Discharge recommendations: Discharge Facility/Level Of Care Needs: rehabilitation facility (pending progress and medical stability)     Barriers to discharge: Barriers to Discharge: Decreased caregiver support, Inaccessible home environment (based on current functional level)    Equipment recommendations:       GOALS:   Physical Therapy Goals        Problem: Physical Therapy Goal    Goal Priority Disciplines Outcome Goal Variances Interventions   Physical Therapy Goal     PT/OT, PT Ongoing (interventions implemented as appropriate)     Description:  Goals to be met by: 3/14/17     Patient will increase functional independence with mobility by performin. Supine to sit with min A.  2. Sit to supine with min A.   3. Rolling R and L with min A.   4. Sitting at edge of bed >5 minutes with min A.   5. PT will assess sit<>stand.                 PLAN:    Patient to be seen daily  to address the above listed problems via gait training, therapeutic activities, therapeutic exercises, neuromuscular re-education  Plan of Care expires: 17  Plan of Care reviewed with: patient         Nely Ron, PT  02/15/2017

## 2017-02-15 NOTE — ASSESSMENT & PLAN NOTE
- Speech following and recommend NPO status with initiation of tube feeding today.  - Secretions evident.  - Consult GI for PEG - hopefully this will not be permanent and he can regain some function in rehab.

## 2017-02-15 NOTE — ASSESSMENT & PLAN NOTE
- Expressive and receptive.  - Patient is unable to speak or follow commands but makes eye contact.

## 2017-02-15 NOTE — PT/OT/SLP EVAL
Speech Language Pathology Evaluation    Esteban Goins   MRN: [id number]   Admitting Diagnosis: Acute ischemic left MCA stroke    Diet recommendations: Solid Diet Level: NPO  Liquid Diet Level: NPO     SLP Treatment Date: 02/14/17  Speech Start Time: 1746     Speech Stop Time: 1801     Speech Total (min): 15 min       TREATMENT BILLABLE MINUTES:  Eval 15     Diagnosis: Acute ischemic left MCA stroke   This is a 77-year-old male  admitted yesterday with complaints of intense right hemiplegia, expressive aphasia and swallowing difficulty. The patient apparently was found on the morning of admission by his brother, in bed with the above symptoms. It is reported that when he went to bed last night he had talked his brother and was reported to have no symptoms. A CT of the head showed a small 5mm low density area in the left thalamus consistent with a lacunar infarct. Chronic small vessel ischemic changes are noted. On arrival to the emergency room he was noted to be severely hypertensive (200/119). Since admission he has been stable with this morning was noted be a little lethargic and poorly responsive but is now awake and responsive and nonverbal this afternoon. An MRI scan of the brain, noncontrast, and MRA of the brain, showed occlusion of the left superior division of the M2 segment of the MCA with resulting large left cerebral infarct involving the left parietal, temporal, and frontal cortices.    Past Medical History   Diagnosis Date    Coronary artery disease     Diabetes mellitus     Hypertension     MI (myocardial infarction)      History reviewed. No pertinent past surgical history.    Has the patient been evaluated by SLP for swallowing? : Yes  Keep patient NPO?: Yes   General Precautions: Standard, aphasia, aspiration, fall, NPO    Current Respiratory Status: nasal cannula     Social Hx: Patient lives with brother, who assists with transportation and diabetes management.        Subjective:  The pt was  nonverbal on arrival. He alerted to voice.   Patient goals: pt unable to state goals at this time 2' aphasia.    Pain Ratin/10              Pain Rating Post-Intervention: 0/10    Objective:   Patient found with: telemetry, NG tube, oxygen, ward catheter    Oral Musculature Evaluation  Oral Musculature: facial asymmetry present, right weakness  Dentition: edentulous  Mucosal Quality: good  Oral Labial Strength and Mobility: other (see comments)  Lingual Strength and Mobility: other (see comments)  Volitional Cough: unable to assess 2' global aphasia  Volitional Swallow: unable to assess 2' global aphasia  Voice Prior to PO Intake: spontaneous grunting; moaning     Cognitive Status:  Behavioral Observations:  Pt alerted to voice.  Pt very alert, with eyes open; able to maintain alertness during 15 min session.  The pt is distractible and requires cues/redirection to attend to brief, simple information.  Pt inconsistently makes eye contact.    Memory and Orientation: Unable to fully assess 2' inconsistent yes/no response and severity of aphasia.  Pt did not nod appropriately to name.   Attention: As above, pt distractible.  Pt does appear to localize to name on both R and L; attention and eye contact on R is observed to be decreased, possibly 2' visual deficit and/or neglect.   Problem Solving: The pt is not able to functionally solve simple problems at this time.  The pt did not attempt to push buttons on call light when placed in hand.  Pt did gesture in what was noted to be attempt to lower head; pt unable to use buttons to do this.   Pragmatics:  Inconsistent eye contact; pt nonverbal at this time; unable to participate in conversation.  Pt did not initiate spontaneous communication.       Language:  The pt is nonverbal.  He presents with frequent, nonpurposeful grunting or moaning; does not attempt to produce speech.  No response noted to tactile cues for phonation, or automatic tasks.  Pt did not imitate  "gestures, such as thumbs up, wave, etc.  Pt was observed to gesture in suspected attempt to lower head of bed, but did not respond to yes/no questions regarding bed.     Auditory Comprehension:  Pt was presented with 5 simple commands, with max visual/verbal/tactile cues.  Pt followed 1/5 simple commands ("give me your hand").  Pt was suspected to nod head "no" x1 in response to age; otherwise, no response to other simple, personal yes/no.  No thumbs up, squeeze hand, close eyes, etc.     Motor Speech:  Pt is nonverbal; no intelligible speech produced at this time.     Voice:  Pt with wet, gurgly respirations and low volume reflexive vocalizations noted.         Reading:  Will assess at a later time.      Visual-Spatial: Pt appears to present with visual deficits on R; may also be neglect.     Additional Treatment:      FIM:  Social Interaction: 2 Maximal Direction--The patient interacts appropriately 25 to 49% of the time, but may beed restraint due to socially inappropriate behaviors.   Problem Solvin Total Assistance--The patient solves routine problems less than 25% of the time, or does not effectively solve problems, and may require constant one-to-one direction to complete simple daily activities.  The patient may need a restraint for safety.   Comprehension: 1 Total Assistance--The patient understands direction and conversation about basic daily needs less than 25% of the time, or does no understand simple, commonly used spoken expressions (e.g hello, how are you) or gestures (e.g. waving good-bye, thank   Expression: 1 Total Assistance--The patient expresses basic daily needs and ideas less than 25% of the time, or does not express basic needs appropriately or consistently despite prompting.   Memory: 1 Total Assistance--The patient recognizes and remembers less than 25% of the time, or does not effectively recognize and remember.     Assessment:  Esteban Goins is a 77 y.o. male with a SLP diagnosis of " nonfluent aphasia, with severe deficits in verbal expression and comprehension, most closely resembling global type aphasia. He also presents with severe oropharyngeal dysphagia.  Unable to fully assess cognitive functioning at this time 2' severity of aphasia. The pt is currently unable to effectively express even simple wants and needs via any modality, or solve simple problems in environment.  He would benefit from ST to provide skilled services to address ability to tolerate any PO by mouth safely, and to improve communication of simple wants and needs via any modality.     Do you have any cultural, spiritual, Hindu conflicts, given your current situation?: pt nonverbal    Discharge recommendations: Discharge Facility/Level Of Care Needs: rehabilitation facility     Goals:   SLP Goals        Problem: SLP Goal    Goal Priority Disciplines Outcome   SLP Goal     SLP           Problem: SLP Goal    Goal Priority Disciplines Outcome   SLP Goal     SLP    Description:  1.  The pt will follow 2-3 simple commands per session, given max visual/verbal cues, and structured environment to facilitate response.  2. The pt will participate in any simple automatic speech task x1 per session, given max multimodal cues.  3. The pt will demonstrate yes/no response x3 per session via any modality, given max multimodal cues.               Plan:   Patient to be seen Therapy Frequency: 5 x/week   Plan of Care expires: 03/16/17  Plan of Care reviewed with: patient  SLP Follow-up?: Yes              Brie Gama CCC-SLP  02/14/2017

## 2017-02-15 NOTE — ASSESSMENT & PLAN NOTE
- Patient found down by brother, presented outside thrombolysis window.  - MRI/MRA with large left MCA stroke.  - 2D echo with diastolic dysfunction  - Increase metoprolol dose today and add back lisinopril at 20 mg daily  - Continue speech/PT/OT

## 2017-02-16 NOTE — SUBJECTIVE & OBJECTIVE
Interval History:  HR in the 60's.  He makes eye contact but does not follow commands.  Briefly twitched his RLE when touched.  Requires frequent suctioning of thick secretions.    Review of Systems   Unable to perform ROS: Patient nonverbal     Objective:     Vital Signs (Most Recent):  Temp: 98.2 °F (36.8 °C) (02/16/17 0701)  Pulse: 60 (02/16/17 0701)  Resp: (!) 26 (02/16/17 0701)  BP: (!) 181/73 (02/16/17 0701)  SpO2: 97 % (02/16/17 0701) Vital Signs (24h Range):  Temp:  [97.8 °F (36.6 °C)-98.8 °F (37.1 °C)] 98.2 °F (36.8 °C)  Pulse:  [] 60  Resp:  [19-37] 26  SpO2:  [93 %-99 %] 97 %  BP: (138-212)/() 181/73     Weight: 78.6 kg (173 lb 4.5 oz)  Body mass index is 24.86 kg/(m^2).    Intake/Output Summary (Last 24 hours) at 02/16/17 0743  Last data filed at 02/16/17 0701   Gross per 24 hour   Intake            989.4 ml   Output             1495 ml   Net           -505.6 ml      Physical Exam   Constitutional: He appears well-developed and well-nourished.   HENT:   Head: Normocephalic and atraumatic.   Edentulous.  Tongue appears dry.  Secretions evident in back of throat.   Eyes: Conjunctivae are normal. Pupils are equal, round, and reactive to light.   Squints his left eye.   Neck: Normal range of motion. Neck supple. No thyromegaly present.   Cardiovascular:   Irregularly irregular rhythm with HR in the 60's.   Pulmonary/Chest: He has no wheezes. He has no rales.   Breathing unlabored, unable to take a deep breath on command.  Coarse upper airway noise.   Abdominal: Soft. Bowel sounds are normal. He exhibits no distension. There is no tenderness.   Musculoskeletal:   Left hand deformed with only the thumb and 3rd digit remaining, heavily scarred.     Neurological: He is alert.   Paralyzed on the right.  When asked to push down with left leg he moves his toes but is unable to follow specific directions.  Slight grasp with left hand on command.   Skin: Skin is warm and dry.       Significant Labs: All  pertinent labs within the past 24 hours have been reviewed.    Significant Imaging: I have reviewed all pertinent imaging results/findings within the past 24 hours.

## 2017-02-16 NOTE — PLAN OF CARE
Problem: Patient Care Overview  Goal: Plan of Care Review  Outcome: Ongoing (interventions implemented as appropriate)  Recommendations  1. If pt remains on continuous feeds  rec increasing rate to 55ml/hr to better meet needs (noted pt has lost 6% wt since admission). 100ml water flush 5 times day. This rate will provide 1584kcal (95%EEN), 79g pro (11%EPN) , 132g CHO, 1580ml water.   2. If pt receives PEG, may wish to provide 1 carton Diabetisource Ac 5 times day. 100ml water flush q feeding.This will provide 1500kcal (91%EEN) , 75g pro (106%EPN), 125g CHO, 1523 ml water. Suggested times 6am, 10am, 2pm, 6pm, 10pm.  Goals: provide >85%EEN via enteral feeds  Nutrition Goal Status: new  Communication of RD Recs: other (comment) (sticky npote)     Continuum of Care Plan  D/C planning: provide >85%EEN via enteral feeds if pt unable to tolerate PO diet

## 2017-02-16 NOTE — PLAN OF CARE
Problem: Physical Therapy Goal  Goal: Physical Therapy Goal  Goals to be met by: 3/14/17     Patient will increase functional independence with mobility by performin. Supine to sit with min A.  2. Sit to supine with min A.   3. Rolling R and L with min A.   4. Sitting at edge of bed >5 minutes with min A.   5. PT will assess sit<>stand.    Outcome: Ongoing (interventions implemented as appropriate)  Pt progressing toward goals. Continues to require 2 person assist for all mobility and balance at edge of bed. Will continue to follow and progress as tolerated. Please see progress note for detailed plan of care and recommendations.

## 2017-02-16 NOTE — CONSULTS
Consult Note  Gastroenterology    Consult Requested By: Dr Maher  Reason for Consult: PEG placement    SUBJECTIVE:     History of Present Illness:  Patient is a 77 y.o. male who presents with weakness noted to have CVA. Onset of symptoms was gradual starting 2 days ago with stable course since that time. Pt unable to give history, await family decision for peg tube    Review of patient's allergies indicates:  No Known Allergies    Scheduled Meds:   allopurinol  200 mg Oral Daily    amiodarone  200 mg Oral QID (WM & HS)    aspirin  81 mg Oral Daily    atorvastatin  40 mg Oral Daily    clopidogrel  75 mg Oral Daily    famotidine (PF)  20 mg Intravenous BID    heparin (porcine)  5,000 Units Subcutaneous Q8H    hydrALAZINE  25 mg Oral Q8H    insulin detemir  10 Units Subcutaneous BID    metoprolol tartrate  50 mg Oral TID    nifedipine 30 MG ORAL TR24  60 mg Oral Daily       Continuous Infusions:   amiodarone 0.5 mg/min (02/16/17 0234)    sodium chloride 0.9%         PRN Meds:.  acetaminophen, dextrose 50%, glucagon (human recombinant), insulin aspart, labetalol, sodium chloride 0.9%    Past Medical History   Diagnosis Date    Coronary artery disease     Diabetes mellitus     Hypertension     MI (myocardial infarction)        History reviewed. No pertinent past surgical history.    History reviewed. No pertinent family history.    Social History     Social History    Marital status: Unknown     Spouse name: N/A    Number of children: N/A    Years of education: N/A     Social History Main Topics    Smoking status: Unknown If Ever Smoked    Smokeless tobacco: None    Alcohol use None    Drug use: None    Sexual activity: Not Asked     Other Topics Concern    None     Social History Narrative    None       Review of Systems:  Constitutional: no fever or chills  Eyes: no visual changes  ENT: no nasal congestion or sore throat  Respiratory: no cough or shorness of breath  Cardiovascular: no  chest pain or palpitations  Gastrointestinal: no nausea or vomiting, no abdominal pain or change in bowel habits  Genitourinary: no hematuria or dysuria    OBJECTIVE:   Physical Exam:  General: Well developed, well nourished, no apparent distress  Vital Signs Range (Last 24H):  Temp:  [97.8 °F (36.6 °C)-98.8 °F (37.1 °C)]   Pulse:  []   Resp:  [19-37]   BP: (138-205)/()   SpO2:  [93 %-99 %]   HEENT: Anicteric, PERRLA  CVS: S1, S2, no murmers/rubs  Lungs: CTA-B, normal excursion  Abdomen: Soft, NT, ND, normal BS's  Extremities: No c/c/e, 1+ DP pulses to BLE's  Skin: No rashes/lesions.      Laboratory:    CBC:   Recent Labs  Lab 02/16/17  0443   WBC 11.82   RBC 5.71   HGB 11.0*   HCT 34.8*      MCV 61*   MCH 19.3*   MCHC 31.6*     CMP:   Recent Labs  Lab 02/14/17  0428  02/16/17  0443   *  < > 215*   CALCIUM 8.5*  < > 8.5*   ALBUMIN 3.2*  --   --    PROT 6.3  --   --      < > 141   K 4.4  < > 4.1   CO2 16*  < > 16*     < > 112*   BUN 27*  < > 37*   CREATININE 2.0*  < > 2.4*   ALKPHOS 123  --   --    ALT 8*  --   --    AST 8*  --   --    BILITOT 0.4  --   --    < > = values in this interval not displayed.  Coagulation: No results for input(s): INR, APTT in the last 168 hours.    Invalid input(s): PT    Recent Results (from the past 336 hour(s))   CBC auto differential    Collection Time: 02/16/17  4:43 AM   Result Value Ref Range    WBC 11.82 3.90 - 12.70 K/uL    Hemoglobin 11.0 (L) 14.0 - 18.0 g/dL    Hematocrit 34.8 (L) 40.0 - 54.0 %    Platelets 287 150 - 350 K/uL   CBC auto differential    Collection Time: 02/15/17  4:21 AM   Result Value Ref Range    WBC 11.80 3.90 - 12.70 K/uL    Hemoglobin 10.5 (L) 14.0 - 18.0 g/dL    Hematocrit 33.9 (L) 40.0 - 54.0 %    Platelets 282 150 - 350 K/uL   CBC auto differential    Collection Time: 02/14/17  4:28 AM   Result Value Ref Range    WBC 12.12 3.90 - 12.70 K/uL    Hemoglobin 11.3 (L) 14.0 - 18.0 g/dL    Hematocrit 36.5 (L) 40.0 - 54.0 %     Platelets 244 150 - 350 K/uL      No results for input(s): APTT, INR, PTT in the last 168 hours.    Recent Labs  Lab 02/13/17  1439 02/14/17  0428 02/15/17  0421 02/16/17  0443    140 142 141   K 4.8 4.4 4.3 4.1    110 112* 112*   CO2 22* 16* 9* 16*   BUN 31* 27* 25* 37*   CREATININE 2.2* 2.0* 2.2* 2.4*   CALCIUM 9.3 8.5* 8.5* 8.5*   PROT 7.0 6.3  --   --    BILITOT 0.5 0.4  --   --    ALKPHOS 135 123  --   --    ALT 7* 8*  --   --    AST 8* 8*  --   --     No results found for: HAV, HEPAIGM, HEPBIGM, HEPBCAB, HBEAG, HEPCAB No results found for: AFP   No results found for: CEA       Diagnostic Results:  No Further    ASSESSMENT/PLAN:     1. CVA (cerebral vascular accident)    2. Acute ischemic left MCA stroke        Plan: 1) await family decision about peg tube  2) check pt/inr

## 2017-02-16 NOTE — PLAN OF CARE
Problem: Patient Care Overview  Goal: Plan of Care Review  Outcome: Ongoing (interventions implemented as appropriate)  Patient in no apparent distress. Sat's  96 % on room air . Will continue to monitor.

## 2017-02-16 NOTE — ASSESSMENT & PLAN NOTE
- Patient found down by brother, presented outside thrombolysis window.  - MRI/MRA with large left MCA stroke.  - 2D echo with diastolic dysfunction  - Continue speech/PT/OT  - ASA/Plavix/statin, blood pressure control  - Consult inpatient rehab

## 2017-02-16 NOTE — PLAN OF CARE
Problem: Patient Care Overview  Goal: Plan of Care Review  Outcome: Ongoing (interventions implemented as appropriate)  Pt received on room air with adequate saturation.

## 2017-02-16 NOTE — ASSESSMENT & PLAN NOTE
- Patient on glipizide at home - SSI while here and Levemir twice daily until bolus feeding is started.  - Increase long acting as needed  - HbA1c 9.7 and sister reports he was noncompliant with diabetes treatment.

## 2017-02-16 NOTE — SUBJECTIVE & OBJECTIVE
Past Medical History   Diagnosis Date    Coronary artery disease     Diabetes mellitus     Hypertension     MI (myocardial infarction)        History reviewed. No pertinent past surgical history.    Review of patient's allergies indicates:  No Known Allergies    No current facility-administered medications on file prior to encounter.      No current outpatient prescriptions on file prior to encounter.     Family History     None        Social History Main Topics    Smoking status: Unknown If Ever Smoked    Smokeless tobacco: Not on file    Alcohol use Not on file    Drug use: Not on file    Sexual activity: Not on file     Review of Systems   Unable to perform ROS: patient nonverbal     Objective:     Vital Signs (Most Recent):  Temp: 98.1 °F (36.7 °C) (02/15/17 1901)  Pulse: 110 (02/15/17 1901)  Resp: (!) 29 (02/15/17 1901)  BP: (!) 152/75 (02/15/17 1901)  SpO2: (!) 94 % (02/15/17 1901) Vital Signs (24h Range):  Temp:  [98 °F (36.7 °C)-99 °F (37.2 °C)] 98.1 °F (36.7 °C)  Pulse:  [] 110  Resp:  [20-37] 29  SpO2:  [93 %-99 %] 94 %  BP: (138-234)/() 152/75     Weight: 82.4 kg (181 lb 10.5 oz)  Body mass index is 26.07 kg/(m^2).    SpO2: (!) 94 %  O2 Device (Oxygen Therapy): nasal cannula      Intake/Output Summary (Last 24 hours) at 02/15/17 1951  Last data filed at 02/15/17 1830   Gross per 24 hour   Intake            275.4 ml   Output             1235 ml   Net           -959.6 ml       Lines/Drains/Airways     Drain                 NG/OG Tube 02/13/17 1455 nasogastric Right nostril 2 days         Urethral Catheter 02/13/17 1015 2 days          Peripheral Intravenous Line                 Peripheral IV - Single Lumen 02/14/17 1455 Right Antecubital 1 day         Peripheral IV - Single Lumen 02/15/17 Right Forearm less than 1 day                Physical Exam   Constitutional: He appears ill. He appears distressed.   HENT:   Head: Normocephalic.   Nose: Nose normal.   Neck: No JVD present. Carotid  bruit is not present.   Cardiovascular: S1 normal and S2 normal.  An irregularly irregular rhythm present. Tachycardia present.    Murmur heard.  Pulmonary/Chest: Effort normal. He has rhonchi.   Abdominal: Soft. Normal appearance. There is no tenderness.   Musculoskeletal:        Right ankle: He exhibits no swelling.        Left ankle: He exhibits no swelling.   Neurological: He is alert.   Does not move right leg and arm   Skin: Skin is warm and dry. No rash noted. He is not diaphoretic.       Current Medications:     allopurinol  200 mg Oral Daily    aspirin  81 mg Oral Daily    atorvastatin  40 mg Oral Daily    clopidogrel  75 mg Oral Daily    famotidine (PF)  20 mg Intravenous BID    heparin (porcine)  5,000 Units Subcutaneous Q8H    insulin detemir  5 Units Subcutaneous QHS    lisinopril  20 mg Oral Daily    metoprolol tartrate  50 mg Oral TID     Current Laboratory Results:    Recent Results (from the past 24 hour(s))   POCT glucose    Collection Time: 02/15/17 12:29 AM   Result Value Ref Range    POCT Glucose 240 (H) 70 - 110 mg/dL   Iron and TIBC    Collection Time: 02/15/17  4:21 AM   Result Value Ref Range    Iron 14 (L) 45 - 160 ug/dL    Transferrin 174 (L) 200 - 375 mg/dL    TIBC 258 250 - 450 ug/dL    Saturated Iron 5 (L) 20 - 50 %   Ferritin    Collection Time: 02/15/17  4:21 AM   Result Value Ref Range    Ferritin 78 20.0 - 300.0 ng/mL   CBC auto differential    Collection Time: 02/15/17  4:21 AM   Result Value Ref Range    WBC 11.80 3.90 - 12.70 K/uL    RBC 5.49 4.60 - 6.20 M/uL    Hemoglobin 10.5 (L) 14.0 - 18.0 g/dL    Hematocrit 33.9 (L) 40.0 - 54.0 %    MCV 62 (L) 82 - 98 fL    MCH 19.1 (L) 27.0 - 31.0 pg    MCHC 31.0 (L) 32.0 - 36.0 %    RDW 15.6 (H) 11.5 - 14.5 %    Platelets 282 150 - 350 K/uL    MPV SEE COMMENT 9.2 - 12.9 fL    Gran # 8.4 (H) 1.8 - 7.7 K/uL    Lymph # 2.0 1.0 - 4.8 K/uL    Mono # 1.2 (H) 0.3 - 1.0 K/uL    Eos # 0.1 0.0 - 0.5 K/uL    Baso # 0.03 0.00 - 0.20 K/uL     Gran% 71.4 38.0 - 73.0 %    Lymph% 16.9 (L) 18.0 - 48.0 %    Mono% 10.3 4.0 - 15.0 %    Eosinophil% 1.1 0.0 - 8.0 %    Basophil% 0.3 0.0 - 1.9 %    Platelet Estimate Appears normal     Aniso Slight     Poik Slight     Poly Occasional     Ovalocytes Occasional     Large/Giant Platelets Present     Differential Method Automated    Basic metabolic panel    Collection Time: 02/15/17  4:21 AM   Result Value Ref Range    Sodium 142 136 - 145 mmol/L    Potassium 4.3 3.5 - 5.1 mmol/L    Chloride 112 (H) 95 - 110 mmol/L    CO2 9 (LL) 23 - 29 mmol/L    Glucose 230 (H) 70 - 110 mg/dL    BUN, Bld 25 (H) 8 - 23 mg/dL    Creatinine 2.2 (H) 0.5 - 1.4 mg/dL    Calcium 8.5 (L) 8.7 - 10.5 mg/dL    Anion Gap 21 (H) 8 - 16 mmol/L    eGFR if African American 32 (A) >60 mL/min/1.73 m^2    eGFR if non African American 27 (A) >60 mL/min/1.73 m^2   Magnesium    Collection Time: 02/15/17  4:21 AM   Result Value Ref Range    Magnesium 1.7 1.6 - 2.6 mg/dL   Phosphorus    Collection Time: 02/15/17  4:21 AM   Result Value Ref Range    Phosphorus 2.8 2.7 - 4.5 mg/dL   POCT glucose    Collection Time: 02/15/17  6:45 AM   Result Value Ref Range    POCT Glucose 271 (H) 70 - 110 mg/dL   Lactic acid, plasma    Collection Time: 02/15/17  7:05 AM   Result Value Ref Range    Lactate (Lactic Acid) 1.4 0.5 - 2.2 mmol/L   POCT glucose    Collection Time: 02/15/17 12:34 PM   Result Value Ref Range    POCT Glucose 246 (H) 70 - 110 mg/dL     Current Imaging Results:    Imaging Results         X-Ray Chest AP Portable (Final result) Result time:  02/14/17 16:53:12    Final result by Anson Garcia DO (02/14/17 16:53:12)    Impression:        See Above       Electronically signed by: ANSON GARCIA DO  Date:     02/14/17  Time:    16:53     Narrative:    Single portable frontal view of the chest    Comparison: None    Results: Ill-defined right basilar opacity concerning for effusion with atelectasis. There is no large pneumothorax. Atherosclerotic aorta.  Nasogastric tube identified with looping configuration the epigastric region may be in the gastric cardia or distal esophagus. Clinical correlation and advancement advised. No large lung consolidation.            MRI Brain Without Contrast (Final result) Result time:  02/13/17 21:01:31    Final result by Jose G Amor MD (02/13/17 21:01:31)    Impression:        Occlusion of the left superior division of the M2 segment of the MCA with resulting large left cerebral infarct involving the left parietal, temporal, and frontal cortices.      Electronically signed by: JOSE G AMOR MD  Date:     02/13/17  Time:    21:01     Narrative:    MRI brain, MRA head,     02/13/17 19:50:00    Accession# [id number]      CLINICAL INDICATION: 77 year old M with stroke     TECHNIQUE: Multiplanar multisequence MR imaging of the brain was performed without intravenous contrast.  Examination performed in conjunction with a 3-D time-of-flight noncontrast MR angiogram of the intracranial vasculature.  Multiple MIP reconstructions were performed.    COMPARISON: No priors.    FINDINGS:  There is a large infarct of the left parietal lobe extending anteriorly to involve the very posterior sulci of the left frontal and temporal lobes. Otherwise, there are a few scattered T2 hyperintensities in the periventricular white matter. Possible remote infarct of the right centrum semi-ovale. The no intracranial blood products. No significant mass effect. No midline shift. Visualized portions of the calvarium and sinuses show no focal abnormality.    MRA:   Visualized portions of the carotid arteries and vertebral arteries are normal in course and caliber. Basilar artery is widely patent. Posterior circulation is intact. Bilateral posterior communicating arteries are intact.    There is mild irregular stenoses of the bilateral MCA. There is occlusion of the superior branch of the M2 segments of the MCA.            MRA Brain (Final result) Result  time:  02/13/17 21:01:31    Final result by Jose G Amro MD (02/13/17 21:01:31)    Impression:        Occlusion of the left superior division of the M2 segment of the MCA with resulting large left cerebral infarct involving the left parietal, temporal, and frontal cortices.      Electronically signed by: JOSE G AMOR MD  Date:     02/13/17  Time:    21:01     Narrative:    MRI brain, MRA head,     02/13/17 19:50:00    Accession# [id number]      CLINICAL INDICATION: 77 year old M with stroke     TECHNIQUE: Multiplanar multisequence MR imaging of the brain was performed without intravenous contrast.  Examination performed in conjunction with a 3-D time-of-flight noncontrast MR angiogram of the intracranial vasculature.  Multiple MIP reconstructions were performed.    COMPARISON: No priors.    FINDINGS:  There is a large infarct of the left parietal lobe extending anteriorly to involve the very posterior sulci of the left frontal and temporal lobes. Otherwise, there are a few scattered T2 hyperintensities in the periventricular white matter. Possible remote infarct of the right centrum semi-ovale. The no intracranial blood products. No significant mass effect. No midline shift. Visualized portions of the calvarium and sinuses show no focal abnormality.    MRA:   Visualized portions of the carotid arteries and vertebral arteries are normal in course and caliber. Basilar artery is widely patent. Posterior circulation is intact. Bilateral posterior communicating arteries are intact.    There is mild irregular stenoses of the bilateral MCA. There is occlusion of the superior branch of the M2 segments of the MCA.            X-Ray Abdomen AP 1 View (Final result) Result time:  02/13/17 17:39:18    Final result by Rosy Hicks MD (02/13/17 17:39:18)    Impression:     As above.      Electronically signed by: ROSY HICKS MD, MD  Date:     02/13/17  Time:    17:39     Narrative:    Comparison: Abdominal radiograph  earlier same day at 1610 hrs.    Findings: Single AP portable semiupright view of the mid to upper abdomen which includes the lower chest. The lower abdomen and pelvis are not included in field-of-view. Enteric tube appears to have been retracted with port and tip projected over the medial left upper quadrant, likely within the proximal stomach. Otherwise, no significant interval change of the included lower chest and upper abdomen.            X-Ray Abdomen AP 1 View (Final result) Result time:  02/13/17 16:45:53    Final result by Anson Garcia DO (02/13/17 16:45:53)    Impression:     See Above .      Electronically signed by: ANOSN GARCIA DO  Date:     02/13/17  Time:    16:45     Narrative:    Technique: Supine view of the upper abdomen    Comparison: 02/13/2017 at 1606    Results:Interval placement of a feeding tube with tip projected over the right upper abdomen in the region of the expected gastric pylorus/proximal duodenum. External monitors overlie the lower thorax and left mid abdomen. Continued few scattered gas-filled loops of bowel within the upper abdomen.            X-Ray Abdomen AP 1 View (Final result) Result time:  02/13/17 16:41:56    Final result by Anson Garcia DO (02/13/17 16:41:56)    Impression:     See Above .      Electronically signed by: ANSON GARCIA DO  Date:     02/13/17  Time:    16:41     Narrative:    Technique: Supine view the abdomen    Comparison: None    Results:Scattered gas-filled loops of bowel within the abdomen with gas and fecal material projected over the visualized colon. Overall nonspecific bowel gas pattern. No feeding tube projected over the visualized mediastinum or upper abdomen. Clinical correlation advised. Multiple external leads project over the lower thorax.            Date of Procedure: 02/13/2017      TEST DESCRIPTION   Technical Quality: This is a technically challenging study.     Aorta: The aortic root is normal in size, measuring 2.5 cm at  sinotubular junction and 3.1 cm at Sinuses of Valsalva.     Left Atrium: The left atrial volume index is moderately enlarged, measuring 46.96 cc/m2.     Left Ventricle: The left ventricle is normal in size, with an end-diastolic diameter of 3.9 cm, and an end-systolic diameter of 2.6 cm. Wall thickness is increased, with the septum and the posterior wall each measuring 1.5 cm across. Relative wall   thickness was increased at 0.77, and the LV mass index was increased at 131.9 g/m2 consistent with concentric left ventricular hypertrophy. Global left ventricular systolic function appears normal. Visually estimated ejection fraction is 60-65%. The LV   Doppler derived stroke volume equals 55.0 ccs.   The E/e'(lat) is 11.  This along with the following abnormalities (EMILY = 46.96) suggests significant diastolic dysfunction.     Right Atrium: The right atrium is normal in size, measuring 5.0 cm in length and 3.4 cm in width in the apical view.     Right Ventricle: The right ventricle is normal in size. Global right ventricular systolic function appears normal. The estimated PA systolic pressure is 32 mmHg.     Aortic Valve:  The aortic valve is moderately sclerotic with mildly restricted leaflet mobility. The peak velocity obtained across the aortic valve is 1.3 m/s, which translates to a peak gradient of 7.0 mmHg. The mean gradient is 3.1 mmHg.     Mitral Valve:  The mitral valve is normal in structure. The pressure half time is 75 msec. The calculated mitral valve area is 2.93 cm2.     Tricuspid Valve:  The tricuspid valve is normal in structure. There is trivial tricuspid regurgitation.     Pulmonary Valve:  The pulmonic valve is normal in structure.     Pericardium: There is no evidence of pericardial effusion.      IVC: IVC is normal in size and collapses > 50% with a sniff, suggesting normal right atrial pressure of 3 mmHg.     Intracavitary: There is no evidence of intracavitary mass or thrombus. There is no  evidence of vegetation.     CONCLUSIONS     1 - Concentric hypertrophy.     2 - Normal left ventricular systolic function (EF 60-65%).     3 - Moderate left atrial enlargement.     4 - Moderate aortic valve sclerosis.     This document has been electronically    SIGNED BY: Nicanor Freitas MD On: 02/13/2017 20:14      ECG: Atrial fibrillation. Anterolateral ST depression.

## 2017-02-16 NOTE — ASSESSMENT & PLAN NOTE
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  - Patient on allopurinol for hyperuricemia  - Proteinuria with diabetes as contributor.  - Lisinopril held for increasing creatinine but will eventually need ACE or ARB for proteinuria.

## 2017-02-16 NOTE — PT/OT/SLP PROGRESS
Speech Language Pathology  Treatment    Esteban Goins   MRN: [id number]   Admitting Diagnosis: Acute ischemic left MCA stroke    Diet recommendations: Solid Diet Level: NPO  Liquid Diet Level: NPO     SLP Treatment Date: 02/15/17  Speech Start Time: 0947     Speech Stop Time: 1004     Speech Total (min): 17 min       TREATMENT BILLABLE MINUTES:  Speech Therapy Individual 17    Has the patient been evaluated by SLP for swallowing? : Yes  Keep patient NPO?: Yes   General Precautions: Standard, aphasia, aspiration, fall  Current Respiratory Status: nasal cannula       Subjective:  The pt was sleeping on arrival, but alerted easily to voice.  He began with repetitive throat clearing when HOB elevated. Nonverbal.                         Objective:   Patient found with: telemetry, NG tube, oxygen, ward catheter.  Nursing assisted ST to reposition pt more upright and comfortably in bed.  The pt was alert.  Observed pt attempting to gesture with L hand; however, gestures noted to be nonspecific/generalized.  ST and nursing attempted to identify what pt was attempting to communicate; lights were changed, HOB lowered, etc.  Pt continued to wave L hand back (c/w initial eval).  The pt makes fleeting eye contact, and was noted to be distractible today.  Pt does attempt to localize to R and L when name is called, but does not turn fully to R side (appears to neglect R side; also may present with visual deficits).  When raised upright, noted pt with frequent, repetitive throat clearing as if to clear secretions; pt sounded wet/gurgly; able to cough and then clear.  Pt may not be able to manage own saliva/secretions at this time.  Pt producing occasional grunting; reflexive vocalizations.  ST presented three simple, personal yes/no questions.  No head nods observed today.  ST attempted to work upon use of L hand for communication; however, pt with missing digits and contracted thumb. Able to use index finger to gesture, but unable  "to do thumbs up/down.  No consistent responses to yes/no noted today via any modality.  ST presented three simple commands, with visual/verbal/[place] assist.  The pt followed 1/3 commands ("raise hand").  Pt did not imitate other functional gestures, such as waving.  The pt remained nonverbal; no response noted to attempts at producing phonation or unison counting task.      ST performed oral care.  Noted pt with open mouthed breathing.  A single ice chip was placed in anterior oral cavity.  The pt allowed the ice chip to melt in the anterior sulcus, with no initiation of oral prep or sensation.  ST suctioned melted liquid from anterior oral cavity.  No other items presented.     FIM:                                 Assessment:  Esteban Goins is a 77 y.o. male with a medical diagnosis of Acute ischemic left MCA stroke and presents with severe nonfluent aphasia c/w global type aphasia, as well as severe oropharyngeal dysphagia.  Pt is currently unable to communicate even simple wants and needs via any modality.  He is also unable to safely tolerate any consistency by mouth; he likely will require more permanent means of nonoral nutrition at this time.  Cont POC.     Discharge recommendations: Discharge Facility/Level Of Care Needs: rehabilitation facility     Goals:   SLP Goals        Problem: SLP Goal    Goal Priority Disciplines Outcome   SLP Goal     SLP           Problem: SLP Goal    Goal Priority Disciplines Outcome   SLP Goal     SLP    Description:  1.  The pt will follow 2-3 simple commands per session, given max visual/verbal cues, and structured environment to facilitate response.  2. The pt will participate in any simple automatic speech task x1 per session, given max multimodal cues.  3. The pt will demonstrate yes/no response x3 per session via any modality, given max multimodal cues.               Plan:   Patient to be seen Therapy Frequency: 5 x/week   Plan of Care expires: 03/16/17  Plan of Care reviewed " with: patient  SLP Follow-up?: Yes              Brie Gama, CCC-SLP  02/15/2017

## 2017-02-16 NOTE — ASSESSMENT & PLAN NOTE
- Patient in NSR on arrival but now in rate controlled atrial fibrillation.  - Cardiologist following and transitioning patient to oral amiodarone.

## 2017-02-16 NOTE — PROGRESS NOTES
"PLACED PT. IN SOFT WRIST RESTRAINT TO L WRIST NOW AFTER 2ND TIME PULLING NGT OUT. CALLED [facility] & S/W ART RN WITH UPDATE & REQUEST FOR ORDER. SHE SAID " OH! I DON'T KNOW IF HE WILL OD IT". DR. THACKER IS MD.  "

## 2017-02-16 NOTE — PLAN OF CARE
Problem: Patient Care Overview  Goal: Plan of Care Review  Outcome: Ongoing (interventions implemented as appropriate)  MULT. INCONT. STOOLS. SKIN INTACT. PULLED NGT OUT TWICE & PLACED IN L WRIST SOFT RESTRAINT AFTER THE 2ND TIME. STARTED ON PO CORDARONE & CONVERTED TO SR 60'S WITH PVC'S BUT THIS AM NOW BACK IN FORTH BETWEEN AFIB & SR. REQUIRED 2 DOSES OF TRANDATE FOR SBP >170.

## 2017-02-16 NOTE — PT/OT/SLP PROGRESS
Physical Therapy  Treatment    Esteban Goins   MRN: [id number]   Admitting Diagnosis: Acute ischemic left MCA stroke    PT Received On: 02/16/17  PT Start Time: 1440     PT Stop Time: 1521    PT Total Time (min): 41 min       Billable Minutes:  Neuromuscular Re-education 40    Treatment Type: Treatment  PT/PTA: PT     PTA Visit Number: 0       General Precautions: Standard,  (fall and aspiration risk)  Orthopedic Precautions: N/A   Braces: N/A    Do you have any cultural, spiritual, [employer]ology conflicts, given your current situation?: unable to assess; pt nonverbal    Subjective:  Communicated with nurse prior to session.  Pt non-verbal, although some moaning at end session as well as some gesturing at L UE.     Pain Rating Post-Intervention:  (Unable to verbalize pain. No signs of pain during session)    Objective:   Patient found with: NG tube, ward catheter    Functional Mobility:  Bed Mobility:   Rolling/Turning to Left: Total assistance  Rolling/Turning Right: Maximum assistance, With side rail  Scooting/Bridging: Total Assistance, With assist of 2 (Pt attempting to bridge through L LE in L hooklying and reaching with L UE for bed rail)  Supine to Sit: Total Assistance, With assist of 2  Sit to Supine: Total Assistance, With assist of  2      Balance:  Static sit: Pt required continuous 1-2 person total assist at trunk due to R lateral lean, although occasional pushing R laterally with L UE. Eyes open throughout sitting activities however decreased attentiveness to activities.   Dynamic Sit: Total assist 1-2 person for trunk shift, scooting hips, and additional person for activities in sitting (reaching and grasping with L UE with assist). Placed pillow under L janet UE for increasing proprioceptive input through axis of upper arm as well as manual/tactile input at shoulder and scapula. Wedge pillow placed under bilateral feet to increase proprioceptive input through axis of lower legs. Assisted with L UE placement  on R knee with verbal and tactile cues for attention to R LE.          AM-PAC 6 CLICK MOBILITY  How much help from another person does this patient currently need?   1 = Unable, Total/Dependent Assistance  2 = A lot, Maximum/Moderate Assistance  3 = A little, Minimum/Contact Guard/Supervision  4 = None, Modified [place]/Independent    Turning over in bed (including adjusting bedclothes, sheets and blankets)?: 1  Sitting down on and standing up from a chair with arms (e.g., wheelchair, bedside commode, etc.): 1  Moving from lying on back to sitting on the side of the bed?: 1  Moving to and from a bed to a chair (including a wheelchair)?: 1  Need to walk in hospital room?: 1  Climbing 3-5 steps with a railing?: 1  Total Score: 6    AM-PAC Raw Score CMS G-Code Modifier Level of Impairment Assistance   6 % Total / Unable   7 - 9 CM 80 - 100% Maximal Assist   10 - 14 CL 60 - 80% Moderate Assist   15 - 19 CK 40 - 60% Moderate Assist   20 - 22 CJ 20 - 40% Minimal Assist   23 CI 1-20% SBA / CGA   24 CH 0% Independent/ Mod I     Patient left supine with all lines intact, call button in reach, bed alarm on and nurse notified.    Assessment:  Esteban Goins is a 77 y.o. male with a medical diagnosis of Acute ischemic left MCA stroke   Pt progressing toward goals. Continues to require 2 person assist for all mobility and balance at edge of bed. Pt would benefit from continued skilled PT to maximize independence and safety with functional mobility.      Rehab identified problem list/impairments: Rehab identified problem list/impairments: weakness, impaired self care skills, impaired functional mobilty, impaired sensation, visual deficits, impaired balance, gait instability, decreased lower extremity function, decreased upper extremity function, decreased ROM, impaired coordination, decreased safety awareness, abnormal tone    Rehab potential is fair.    Activity tolerance: Fair    Discharge recommendations: Discharge  Facility/Level Of Care Needs: rehabilitation facility     Barriers to discharge: Barriers to Discharge: Decreased caregiver support, Inaccessible home environment (based on current functional level)    Equipment recommendations:       GOALS:   Physical Therapy Goals        Problem: Physical Therapy Goal    Goal Priority Disciplines Outcome Goal Variances Interventions   Physical Therapy Goal     PT/OT, PT Ongoing (interventions implemented as appropriate)     Description:  Goals to be met by: 3/14/17     Patient will increase functional independence with mobility by performin. Supine to sit with min A.  2. Sit to supine with min A.   3. Rolling R and L with min A.   4. Sitting at edge of bed >5 minutes with min A.   5. PT will assess sit<>stand.                 PLAN:    Patient to be seen daily  to address the above listed problems via gait training, therapeutic activities, therapeutic exercises, neuromuscular re-education  Plan of Care expires: 17  Plan of Care reviewed with: patient         Nely Velasquez, PT  2017

## 2017-02-16 NOTE — ASSESSMENT & PLAN NOTE
- Speech following and recommend NPO status with tube feeding.  - Secretions evident due to inability to handle them - check CXR today  - Consult GI for PEG - hopefully this will not be permanent and he can regain some function in rehab.

## 2017-02-16 NOTE — ASSESSMENT & PLAN NOTE
- Appears to have iron deficiency in addition to anemia of chronic kidney disease.  - Iron studies show low iron stores with normal TIBC

## 2017-02-16 NOTE — PLAN OF CARE
Problem: Patient Care Overview  Goal: Plan of Care Review  Outcome: Ongoing (interventions implemented as appropriate)  Pt rested comfortably in bed throughout shift without incident.  Pt alert, follows commands however unable to adequately assess pt's orientation - pt remains aphasic. Right sided hemiparesis continues however slight movement of right lower extremity noted with stimulation.  VSS, pt afebrile, non-verbal indicators of pain absent.  Pt initially moving between a-fib and normal sinus rhythm however stabilized normal sinus rhythm as shift progressed. Continuous intravenous amiodarone discontinued per MD order and replaced with oral amiodarone 200 mg four times daily administered via NG tube.  PRN labetalol administered twice for SBP > 170.  Soft restraint applied to pt's left hand to prevent pt from removing NG tube as he has three times previously.  Pt with adequate dark yellow urine output to ward and with several medium soft/loose brown stools.  Tube feeds continued at 45 ml/hr - pt tolerated well with no residuals.  PT/OT attended pt and got pt to sit up at bedside for a short period of time. Pt turned every two hours and able to assist minimally during turns - no skin breakdown noted.

## 2017-02-16 NOTE — PROGRESS NOTES
[facility]  Adult Nutrition  Consult Note    SUMMARY     Recommendations  1. If pt remains on continuous feeds  rec increasing rate to 55ml/hr to better meet needs (noted pt has lost 6% wt since admission). 100ml water flush 5 times day. This rate will provide 1584kcal (95%EEN), 79g pro (11%EPN) , 132g CHO, 1580ml water.   2. If pt receives PEG, may wish to provide 1 carton Diabetisource Ac 5 times day. 100ml water flush q feeding.This will provide 1500kcal (91%EEN) , 75g pro (106%EPN), 125g CHO, 1523 ml water. Suggested times 6am, 10am, 2pm, 6pm, 10pm.  Goals: provide >85%EEN via enteral feeds  Nutrition Goal Status: new  Communication of RD Recs: other (comment) (sticky npote)    Continuum of Care Plan  D/C planning: provide >85%EEN via enteral feeds if pt unable to tolerate PO diet     Reason for Assessment  Reason for Assessment: RD follow-up  Diagnosis: stroke/CVA  Relevent Medical History: Dm 2, CKD III, HTN   Interdisciplinary Rounds: did not attend   General: noted pt to be evaluated by GI for PEG, enteral feeds may be long tern, rate may need to be increased to sustain weight.    Nutrition Prescription Ordered  Current Diet Order: NPO  Current Nutrition Support Formula Ordered:  (Diabetisource AC)  Current Nutrition Support Rate Ordered: 45 (ml) (ml/hr)      Evaluation of Received Nutrients/Fluid Intake  Enteral Calories (kcal): 1296  Enteral Protein (gm): 65  Enteral (Free Water) Fluid (mL): 883  Energy Calories Required: not meeting needs  Protein Required: not meeting needs  IV Fluid (mL): 3000  Fluid Required: not meeting needs  Tolerance: tolerating     Nutrition Risk Screen   Nutrition Risk Screen: no indicators present    Nutrition/Diet History  Patient Reported Diet/Restrictions/Preferences: general  Factors Affecting Nutritional Intake: NPO, difficulty/impaired swallowing    Labs/Tests/Procedures/Meds  Pertinent Labs Reviewed: reviewed  Pertinent Labs Comments: BUN 37, Cr  2.4  Pertinent Medications Reviewed: reviewed  Pertinent Medications Comments: atorvastatin, insulin (prn)    Physical Findings  Overall Physical Appearance: overweight  Skin: intact    Anthropometrics  Height (inches): 70 in  Weight Method: Bed Scale  Weight (kg): 78.6 kg  Ideal Body Weight (IBW), Male: 166 lb  % Ideal Body Weight, Male (lb): 104.39 lb  BMI (kg/m2): 24.86  BMI Grade: 18.5-24.9 - normal        Estimated/Assessed Needs  Weight Used For Calorie Calculations: 78.6 kg (173 lb 4.5 oz)   Height (cm): 177.8 cm  Energy Need Method: [place]-St Jeor (1663kcal/day (X 1.1))  RMR ([place]-St. Jeor Equation): 1512.78  Weight Used For Protein Calculations: 78.6 kg (173 lb 4.5 oz)  Protein Requirements: 63-71g/day  0.8 gm Protein (gm): 63.01 and 0.9 gm Protein (gm): 70.89  Fluid Need Method: RDA Method (1 ml per kcal or per MD)   Estimated CHO needs: 208g/day    Nutrition Diagnosis:  Problem: Inadequate energy intake  Etiology: CVA, dysphagia  Symptoms: NPO  Status: continues    Monitor and Evaluation  Food and Nutrient Intake: energy intake, food and beverage intake, enteral nutrition intake  Food and Nutrient Adminstration: diet order, enteral and parenteral nutrition administration  Physical Activity and Function: nutrition-related ADLs and IADLs  Anthropometric Measurements: weight, weight change  Biochemical Data, Medical Tests and Procedures: electrolyte and renal panel, gastrointestinal profile, glucose/endocrine profile, inflammatory profile, lipid profile  Nutrition-Focused Physical Findings: overall appearance    Nutrition Risk    Level of Risk: moderate , follow twice weekly    Nutrition Follow-Up    RD Follow-up?: Yes    Assessment and Plan    No new Assessment & Plan notes have been filed under this hospital service since the last note was generated.  Service: Nutrition

## 2017-02-16 NOTE — CONSULTS
[facility]  Cardiology  Consult Note    Patient Name: Esteban Goins  MRN: [id number]  Admission Date: 2/13/2017  Hospital Length of Stay: 2 days  Code Status: Full Code   Attending Provider: Cristy Cullen MD   Consulting Provider: Nicanor Freitas MD  Primary Care Physician: [facility]  Principal Problem:Acute ischemic left MCA stroke    Patient information was obtained from patient and past medical records.     Consults  Subjective:     Chief Complaint:    CVA. Atrial Fibrillation    HPI:   78 yo man with hypertension. Presents after a cerebrovascular accident with right sided weakness on 2/13/2017. On 2/14/2017 he went into atrial fibrillation and was begun on amiodarone for rate control.    Past Medical History   Diagnosis Date    Coronary artery disease     Diabetes mellitus     Hypertension     MI (myocardial infarction)        History reviewed. No pertinent past surgical history.    Review of patient's allergies indicates:  No Known Allergies    No current facility-administered medications on file prior to encounter.      No current outpatient prescriptions on file prior to encounter.     Family History     None        Social History Main Topics    Smoking status: Unknown If Ever Smoked    Smokeless tobacco: Not on file    Alcohol use Not on file    Drug use: Not on file    Sexual activity: Not on file     Review of Systems   Unable to perform ROS: patient nonverbal     Objective:     Vital Signs (Most Recent):  Temp: 98.1 °F (36.7 °C) (02/15/17 1901)  Pulse: 110 (02/15/17 1901)  Resp: (!) 29 (02/15/17 1901)  BP: (!) 152/75 (02/15/17 1901)  SpO2: (!) 94 % (02/15/17 1901) Vital Signs (24h Range):  Temp:  [98 °F (36.7 °C)-99 °F (37.2 °C)] 98.1 °F (36.7 °C)  Pulse:  [] 110  Resp:  [20-37] 29  SpO2:  [93 %-99 %] 94 %  BP: (138-234)/() 152/75     Weight: 82.4 kg (181 lb 10.5 oz)  Body mass index is 26.07 kg/(m^2).    SpO2: (!) 94 %  O2 Device  (Oxygen Therapy): nasal cannula      Intake/Output Summary (Last 24 hours) at 02/15/17 1951  Last data filed at 02/15/17 1830   Gross per 24 hour   Intake            275.4 ml   Output             1235 ml   Net           -959.6 ml       Lines/Drains/Airways     Drain                 NG/OG Tube 02/13/17 1455 nasogastric Right nostril 2 days         Urethral Catheter 02/13/17 1015 2 days          Peripheral Intravenous Line                 Peripheral IV - Single Lumen 02/14/17 1455 Right Antecubital 1 day         Peripheral IV - Single Lumen 02/15/17 Right Forearm less than 1 day                Physical Exam   Constitutional: He appears ill. He appears distressed.   HENT:   Head: Normocephalic.   Nose: Nose normal.   Neck: No JVD present. Carotid bruit is not present.   Cardiovascular: S1 normal and S2 normal.  An irregularly irregular rhythm present. Tachycardia present.    Murmur heard.  Pulmonary/Chest: Effort normal. He has rhonchi.   Abdominal: Soft. Normal appearance. There is no tenderness.   Musculoskeletal:        Right ankle: He exhibits no swelling.        Left ankle: He exhibits no swelling.   Neurological: He is alert.   Does not move right leg and arm   Skin: Skin is warm and dry. No rash noted. He is not diaphoretic.       Current Medications:     allopurinol  200 mg Oral Daily    aspirin  81 mg Oral Daily    atorvastatin  40 mg Oral Daily    clopidogrel  75 mg Oral Daily    famotidine (PF)  20 mg Intravenous BID    heparin (porcine)  5,000 Units Subcutaneous Q8H    insulin detemir  5 Units Subcutaneous QHS    lisinopril  20 mg Oral Daily    metoprolol tartrate  50 mg Oral TID     Current Laboratory Results:    Recent Results (from the past 24 hour(s))   POCT glucose    Collection Time: 02/15/17 12:29 AM   Result Value Ref Range    POCT Glucose 240 (H) 70 - 110 mg/dL   Iron and TIBC    Collection Time: 02/15/17  4:21 AM   Result Value Ref Range    Iron 14 (L) 45 - 160 ug/dL    Transferrin 174  (L) 200 - 375 mg/dL    TIBC 258 250 - 450 ug/dL    Saturated Iron 5 (L) 20 - 50 %   Ferritin    Collection Time: 02/15/17  4:21 AM   Result Value Ref Range    Ferritin 78 20.0 - 300.0 ng/mL   CBC auto differential    Collection Time: 02/15/17  4:21 AM   Result Value Ref Range    WBC 11.80 3.90 - 12.70 K/uL    RBC 5.49 4.60 - 6.20 M/uL    Hemoglobin 10.5 (L) 14.0 - 18.0 g/dL    Hematocrit 33.9 (L) 40.0 - 54.0 %    MCV 62 (L) 82 - 98 fL    MCH 19.1 (L) 27.0 - 31.0 pg    MCHC 31.0 (L) 32.0 - 36.0 %    RDW 15.6 (H) 11.5 - 14.5 %    Platelets 282 150 - 350 K/uL    MPV SEE COMMENT 9.2 - 12.9 fL    Gran # 8.4 (H) 1.8 - 7.7 K/uL    Lymph # 2.0 1.0 - 4.8 K/uL    Mono # 1.2 (H) 0.3 - 1.0 K/uL    Eos # 0.1 0.0 - 0.5 K/uL    Baso # 0.03 0.00 - 0.20 K/uL    Gran% 71.4 38.0 - 73.0 %    Lymph% 16.9 (L) 18.0 - 48.0 %    Mono% 10.3 4.0 - 15.0 %    Eosinophil% 1.1 0.0 - 8.0 %    Basophil% 0.3 0.0 - 1.9 %    Platelet Estimate Appears normal     Aniso Slight     Poik Slight     Poly Occasional     Ovalocytes Occasional     Large/Giant Platelets Present     Differential Method Automated    Basic metabolic panel    Collection Time: 02/15/17  4:21 AM   Result Value Ref Range    Sodium 142 136 - 145 mmol/L    Potassium 4.3 3.5 - 5.1 mmol/L    Chloride 112 (H) 95 - 110 mmol/L    CO2 9 (LL) 23 - 29 mmol/L    Glucose 230 (H) 70 - 110 mg/dL    BUN, Bld 25 (H) 8 - 23 mg/dL    Creatinine 2.2 (H) 0.5 - 1.4 mg/dL    Calcium 8.5 (L) 8.7 - 10.5 mg/dL    Anion Gap 21 (H) 8 - 16 mmol/L    eGFR if African American 32 (A) >60 mL/min/1.73 m^2    eGFR if non African American 27 (A) >60 mL/min/1.73 m^2   Magnesium    Collection Time: 02/15/17  4:21 AM   Result Value Ref Range    Magnesium 1.7 1.6 - 2.6 mg/dL   Phosphorus    Collection Time: 02/15/17  4:21 AM   Result Value Ref Range    Phosphorus 2.8 2.7 - 4.5 mg/dL   POCT glucose    Collection Time: 02/15/17  6:45 AM   Result Value Ref Range    POCT Glucose 271 (H) 70 - 110 mg/dL   Lactic acid, plasma     Collection Time: 02/15/17  7:05 AM   Result Value Ref Range    Lactate (Lactic Acid) 1.4 0.5 - 2.2 mmol/L   POCT glucose    Collection Time: 02/15/17 12:34 PM   Result Value Ref Range    POCT Glucose 246 (H) 70 - 110 mg/dL     Current Imaging Results:    Imaging Results         X-Ray Chest AP Portable (Final result) Result time:  02/14/17 16:53:12    Final result by Anson Garcia DO (02/14/17 16:53:12)    Impression:        See Above       Electronically signed by: ANSON GARCIA DO  Date:     02/14/17  Time:    16:53     Narrative:    Single portable frontal view of the chest    Comparison: None    Results: Ill-defined right basilar opacity concerning for effusion with atelectasis. There is no large pneumothorax. Atherosclerotic aorta. Nasogastric tube identified with looping configuration the epigastric region may be in the gastric cardia or distal esophagus. Clinical correlation and advancement advised. No large lung consolidation.            MRI Brain Without Contrast (Final result) Result time:  02/13/17 21:01:31    Final result by Jose G Amor MD (02/13/17 21:01:31)    Impression:        Occlusion of the left superior division of the M2 segment of the MCA with resulting large left cerebral infarct involving the left parietal, temporal, and frontal cortices.      Electronically signed by: JOSE G AMOR MD  Date:     02/13/17  Time:    21:01     Narrative:    MRI brain, MRA head,     02/13/17 19:50:00    Accession# [id number]      CLINICAL INDICATION: 77 year old M with stroke     TECHNIQUE: Multiplanar multisequence MR imaging of the brain was performed without intravenous contrast.  Examination performed in conjunction with a 3-D time-of-flight noncontrast MR angiogram of the intracranial vasculature.  Multiple MIP reconstructions were performed.    COMPARISON: No priors.    FINDINGS:  There is a large infarct of the left parietal lobe extending anteriorly to involve the very posterior sulci of the  left frontal and temporal lobes. Otherwise, there are a few scattered T2 hyperintensities in the periventricular white matter. Possible remote infarct of the right centrum semi-ovale. The no intracranial blood products. No significant mass effect. No midline shift. Visualized portions of the calvarium and sinuses show no focal abnormality.    MRA:   Visualized portions of the carotid arteries and vertebral arteries are normal in course and caliber. Basilar artery is widely patent. Posterior circulation is intact. Bilateral posterior communicating arteries are intact.    There is mild irregular stenoses of the bilateral MCA. There is occlusion of the superior branch of the M2 segments of the MCA.            MRA Brain (Final result) Result time:  02/13/17 21:01:31    Final result by Jose G Amor MD (02/13/17 21:01:31)    Impression:        Occlusion of the left superior division of the M2 segment of the MCA with resulting large left cerebral infarct involving the left parietal, temporal, and frontal cortices.      Electronically signed by: JOSE G AMOR MD  Date:     02/13/17  Time:    21:01     Narrative:    MRI brain, MRA head,     02/13/17 19:50:00    Accession# [id number]      CLINICAL INDICATION: 77 year old M with stroke     TECHNIQUE: Multiplanar multisequence MR imaging of the brain was performed without intravenous contrast.  Examination performed in conjunction with a 3-D time-of-flight noncontrast MR angiogram of the intracranial vasculature.  Multiple MIP reconstructions were performed.    COMPARISON: No priors.    FINDINGS:  There is a large infarct of the left parietal lobe extending anteriorly to involve the very posterior sulci of the left frontal and temporal lobes. Otherwise, there are a few scattered T2 hyperintensities in the periventricular white matter. Possible remote infarct of the right centrum semi-ovale. The no intracranial blood products. No significant mass effect. No midline shift.  Visualized portions of the calvarium and sinuses show no focal abnormality.    MRA:   Visualized portions of the carotid arteries and vertebral arteries are normal in course and caliber. Basilar artery is widely patent. Posterior circulation is intact. Bilateral posterior communicating arteries are intact.    There is mild irregular stenoses of the bilateral MCA. There is occlusion of the superior branch of the M2 segments of the MCA.            X-Ray Abdomen AP 1 View (Final result) Result time:  02/13/17 17:39:18    Final result by Rosy Ruiz MD (02/13/17 17:39:18)    Impression:     As above.      Electronically signed by: ROSY RUIZ MD, MD  Date:     02/13/17  Time:    17:39     Narrative:    Comparison: Abdominal radiograph earlier same day at 1610 hrs.    Findings: Single AP portable semiupright view of the mid to upper abdomen which includes the lower chest. The lower abdomen and pelvis are not included in field-of-view. Enteric tube appears to have been retracted with port and tip projected over the medial left upper quadrant, likely within the proximal stomach. Otherwise, no significant interval change of the included lower chest and upper abdomen.            X-Ray Abdomen AP 1 View (Final result) Result time:  02/13/17 16:45:53    Final result by Anson Garcia DO (02/13/17 16:45:53)    Impression:     See Above .      Electronically signed by: ANSON GARCIA DO  Date:     02/13/17  Time:    16:45     Narrative:    Technique: Supine view of the upper abdomen    Comparison: 02/13/2017 at 1606    Results:Interval placement of a feeding tube with tip projected over the right upper abdomen in the region of the expected gastric pylorus/proximal duodenum. External monitors overlie the lower thorax and left mid abdomen. Continued few scattered gas-filled loops of bowel within the upper abdomen.            X-Ray Abdomen AP 1 View (Final result) Result time:  02/13/17 16:41:56    Final result by Anson FIORE  DO Jose (02/13/17 16:41:56)    Impression:     See Above .      Electronically signed by: MICHELLE JOSE MCCULLOUGH  Date:     02/13/17  Time:    16:41     Narrative:    Technique: Supine view the abdomen    Comparison: None    Results:Scattered gas-filled loops of bowel within the abdomen with gas and fecal material projected over the visualized colon. Overall nonspecific bowel gas pattern. No feeding tube projected over the visualized mediastinum or upper abdomen. Clinical correlation advised. Multiple external leads project over the lower thorax.            Date of Procedure: 02/13/2017      TEST DESCRIPTION   Technical Quality: This is a technically challenging study.     Aorta: The aortic root is normal in size, measuring 2.5 cm at sinotubular junction and 3.1 cm at Sinuses of Valsalva.     Left Atrium: The left atrial volume index is moderately enlarged, measuring 46.96 cc/m2.     Left Ventricle: The left ventricle is normal in size, with an end-diastolic diameter of 3.9 cm, and an end-systolic diameter of 2.6 cm. Wall thickness is increased, with the septum and the posterior wall each measuring 1.5 cm across. Relative wall   thickness was increased at 0.77, and the LV mass index was increased at 131.9 g/m2 consistent with concentric left ventricular hypertrophy. Global left ventricular systolic function appears normal. Visually estimated ejection fraction is 60-65%. The LV   Doppler derived stroke volume equals 55.0 ccs.   The E/e'(lat) is 11.  This along with the following abnormalities (EMILY = 46.96) suggests significant diastolic dysfunction.     Right Atrium: The right atrium is normal in size, measuring 5.0 cm in length and 3.4 cm in width in the apical view.     Right Ventricle: The right ventricle is normal in size. Global right ventricular systolic function appears normal. The estimated PA systolic pressure is 32 mmHg.     Aortic Valve:  The aortic valve is moderately sclerotic with mildly restricted  leaflet mobility. The peak velocity obtained across the aortic valve is 1.3 m/s, which translates to a peak gradient of 7.0 mmHg. The mean gradient is 3.1 mmHg.     Mitral Valve:  The mitral valve is normal in structure. The pressure half time is 75 msec. The calculated mitral valve area is 2.93 cm2.     Tricuspid Valve:  The tricuspid valve is normal in structure. There is trivial tricuspid regurgitation.     Pulmonary Valve:  The pulmonic valve is normal in structure.     Pericardium: There is no evidence of pericardial effusion.      IVC: IVC is normal in size and collapses > 50% with a sniff, suggesting normal right atrial pressure of 3 mmHg.     Intracavitary: There is no evidence of intracavitary mass or thrombus. There is no evidence of vegetation.     CONCLUSIONS     1 - Concentric hypertrophy.     2 - Normal left ventricular systolic function (EF 60-65%).     3 - Moderate left atrial enlargement.     4 - Moderate aortic valve sclerosis.     This document has been electronically    SIGNED BY: Nicanor Freitas MD On: 02/13/2017 20:14      ECG: Atrial fibrillation. Anterolateral ST depression.    Assessment and Plan:     1. Atrial Fibrillation   2/14/2017: Diagnosed with paroxysmal atrial fibrillation.   [id number]=1 (HA2S2V)   On amiodarone iv and metoprolol 50 mg Q8.   Will change amiodarone to 200 mg Q6.    2. Stroke Prevention   On aspirin 81 mg Q24 and clopidogrel 75 mg Q24.   Would not favor anticoagulation at present.    3. Cerebrovascular Accident   Imaging tests reveal lacunar infarct.      VTE Risk Mitigation         Ordered     heparin (porcine) injection 5,000 Units  Every 8 hours     Route:  Subcutaneous        02/13/17 1421     Medium Risk of VTE  Once      02/13/17 1421     Place sequential compression device  Until discontinued      02/13/17 1421          Thank you for your consult.    I will follow-up with patient. Please contact us if you have any additional questions.    Nicanor Freitas  MD  Cardiology   [facility]

## 2017-02-16 NOTE — ASSESSMENT & PLAN NOTE
- Metoprolol increased to 50 mg tid  - Lisinopril 20 mg daily added yesterday - creatinine increased to 2.4 today so will back off this and add hydralazine.  - Add nifedipine 30 mg today.

## 2017-02-16 NOTE — PROGRESS NOTES
[facility]  Cardiology  Progress Note    Patient Name: Esteban Goins  MRN: [id number]  Admission Date: 2/13/2017  Hospital Length of Stay: 3 days  Code Status: Full Code   Attending Physician: Cristy Cullen MD   Primary Care Physician: [facility] [place]  Expected Discharge Date:   Principal Problem:Acute ischemic left MCA stroke    Subjective:     Brief HPI:    78 yo man with hypertension. Presents after a cerebrovascular accident with right sided weakness on 2/13/2017. On 2/14/2017 he went into atrial fibrillation and was begun on amiodarone for rate control.    Hospital Course:     2/14/2017: Began amiodarone.    Interval History:     Mostly in sinus rhythm.    Review of Systems   Unable to perform ROS: patient nonverbal     Objective:     Vital Signs (Most Recent):  Temp: 98.6 °F (37 °C) (02/16/17 1500)  Pulse: 60 (02/16/17 1700)  Resp: (!) 24 (02/16/17 1700)  BP: (!) 158/73 (02/16/17 1700)  SpO2: 97 % (02/16/17 1700) Vital Signs (24h Range):  Temp:  [97.8 °F (36.6 °C)-98.6 °F (37 °C)] 98.6 °F (37 °C)  Pulse:  [] 60  Resp:  [19-32] 24  SpO2:  [94 %-98 %] 97 %  BP: (137-205)/() 158/73     Weight: 78.6 kg (173 lb 4.5 oz)  Body mass index is 24.86 kg/(m^2).    SpO2: 97 %  O2 Device (Oxygen Therapy): room air      Intake/Output Summary (Last 24 hours) at 02/16/17 1740  Last data filed at 02/16/17 1700   Gross per 24 hour   Intake           1419.4 ml   Output              850 ml   Net            569.4 ml       Lines/Drains/Airways     Drain                 NG/OG Tube 02/13/17 1455 nasogastric Right nostril 3 days         Urethral Catheter 02/13/17 1015 3 days          Peripheral Intravenous Line                 Peripheral IV - Single Lumen 02/14/17 1455 Right Antecubital 2 days         Peripheral IV - Single Lumen 02/15/17 Right Forearm 1 day                Physical Exam   Constitutional: No distress.   Neck: No JVD present. No thyromegaly present.    Cardiovascular: Normal rate, regular rhythm, S1 normal and S2 normal.  Exam reveals gallop and S4.    Pulmonary/Chest: Effort normal and breath sounds normal.   Abdominal: Normal appearance. There is no tenderness.   Skin: Skin is warm and dry.     Current Medications:     allopurinol  200 mg Oral Daily    amiodarone  200 mg Oral QID (WM & HS)    aspirin  81 mg Oral Daily    atorvastatin  40 mg Oral Daily    clopidogrel  75 mg Oral Daily    famotidine (PF)  20 mg Intravenous BID    heparin (porcine)  5,000 Units Subcutaneous Q8H    hydrALAZINE  25 mg Oral Q8H    insulin detemir  10 Units Subcutaneous BID    metoprolol tartrate  50 mg Oral TID    nifedipine 30 MG ORAL TR24  60 mg Oral Daily     Current Laboratory Results:    Recent Results (from the past 24 hour(s))   POCT glucose    Collection Time: 02/15/17  7:07 PM   Result Value Ref Range    POCT Glucose 241 (H) 70 - 110 mg/dL   Occult blood x 1, stool    Collection Time: 02/15/17  7:21 PM   Result Value Ref Range    Occult Blood Negative Negative   POCT glucose    Collection Time: 02/16/17 12:18 AM   Result Value Ref Range    POCT Glucose 342 (H) 70 - 110 mg/dL   CBC auto differential    Collection Time: 02/16/17  4:43 AM   Result Value Ref Range    WBC 11.82 3.90 - 12.70 K/uL    WBC-Corrected for NRBC's 11.82 3.90 - 12.70 K/uL    RBC 5.71 4.60 - 6.20 M/uL    Hemoglobin 11.0 (L) 14.0 - 18.0 g/dL    Hematocrit 34.8 (L) 40.0 - 54.0 %    MCV 61 (L) 82 - 98 fL    MCH 19.3 (L) 27.0 - 31.0 pg    MCHC 31.6 (L) 32.0 - 36.0 %    RDW 16.8 (H) 11.5 - 14.5 %    Platelets 287 150 - 350 K/uL    MPV 11.6 9.2 - 12.9 fL    Gran% 68.2 38.0 - 73.0 %    Lymph% 15.3 (L) 18.0 - 48.0 %    Mono% 14.2 4.0 - 15.0 %    Eosinophil% 2.2 0.0 - 8.0 %    Basophil% 0.1 0.0 - 1.9 %    Platelet Estimate Appears normal     Aniso Slight     Poik Slight     Poly Occasional     Hypo Occasional     Ovalocytes Occasional     Large/Giant Platelets Present     Fragmented Cells Occasional      Differential Method Automated    Basic metabolic panel    Collection Time: 02/16/17  4:43 AM   Result Value Ref Range    Sodium 141 136 - 145 mmol/L    Potassium 4.1 3.5 - 5.1 mmol/L    Chloride 112 (H) 95 - 110 mmol/L    CO2 16 (L) 23 - 29 mmol/L    Glucose 215 (H) 70 - 110 mg/dL    BUN, Bld 37 (H) 8 - 23 mg/dL    Creatinine 2.4 (H) 0.5 - 1.4 mg/dL    Calcium 8.5 (L) 8.7 - 10.5 mg/dL    Anion Gap 13 8 - 16 mmol/L    eGFR if African American 29 (A) >60 mL/min/1.73 m^2    eGFR if non African American 25 (A) >60 mL/min/1.73 m^2   POCT glucose    Collection Time: 02/16/17  5:51 AM   Result Value Ref Range    POCT Glucose 262 (H) 70 - 110 mg/dL   POCT glucose    Collection Time: 02/16/17 11:51 AM   Result Value Ref Range    POCT Glucose 315 (H) 70 - 110 mg/dL     Current Imaging Results:    Imaging Results         X-Ray Chest 1 View (Final result) Result time:  02/16/17 09:18:52    Final result by Parker Padron Jr., MD (02/16/17 09:18:52)    Narrative:    Chest single view compared to February 14.  NG tube directed toward the gastric antrum.  Increased opacification at the bases likely some pleural fluid left greater than right.  Heart remains enlarged.  No pneumothorax.    Impression NG tube in satisfactory position.      Electronically signed by: PARKER PADRON MD  Date:     02/16/17  Time:    09:18             X-Ray Chest AP Portable (Final result) Result time:  02/14/17 16:53:12    Final result by Anson Garcia DO (02/14/17 16:53:12)    Impression:        See Above       Electronically signed by: ANSON GARCIA DO  Date:     02/14/17  Time:    16:53     Narrative:    Single portable frontal view of the chest    Comparison: None    Results: Ill-defined right basilar opacity concerning for effusion with atelectasis. There is no large pneumothorax. Atherosclerotic aorta. Nasogastric tube identified with looping configuration the epigastric region may be in the gastric cardia or distal esophagus. Clinical correlation  and advancement advised. No large lung consolidation.            MRI Brain Without Contrast (Final result) Result time:  02/13/17 21:01:31    Final result by Jose G Amor MD (02/13/17 21:01:31)    Impression:        Occlusion of the left superior division of the M2 segment of the MCA with resulting large left cerebral infarct involving the left parietal, temporal, and frontal cortices.      Electronically signed by: JOSE G AMOR MD  Date:     02/13/17  Time:    21:01     Narrative:    MRI brain, MRA head,     02/13/17 19:50:00    Accession# [id number]      CLINICAL INDICATION: 77 year old M with stroke     TECHNIQUE: Multiplanar multisequence MR imaging of the brain was performed without intravenous contrast.  Examination performed in conjunction with a 3-D time-of-flight noncontrast MR angiogram of the intracranial vasculature.  Multiple MIP reconstructions were performed.    COMPARISON: No priors.    FINDINGS:  There is a large infarct of the left parietal lobe extending anteriorly to involve the very posterior sulci of the left frontal and temporal lobes. Otherwise, there are a few scattered T2 hyperintensities in the periventricular white matter. Possible remote infarct of the right centrum semi-ovale. The no intracranial blood products. No significant mass effect. No midline shift. Visualized portions of the calvarium and sinuses show no focal abnormality.    MRA:   Visualized portions of the carotid arteries and vertebral arteries are normal in course and caliber. Basilar artery is widely patent. Posterior circulation is intact. Bilateral posterior communicating arteries are intact.    There is mild irregular stenoses of the bilateral MCA. There is occlusion of the superior branch of the M2 segments of the MCA.            MRA Brain (Final result) Result time:  02/13/17 21:01:31    Final result by Jose G Amor MD (02/13/17 21:01:31)    Impression:        Occlusion of the left superior division of  the M2 segment of the MCA with resulting large left cerebral infarct involving the left parietal, temporal, and frontal cortices.      Electronically signed by: SERENA COSTA MD  Date:     02/13/17  Time:    21:01     Narrative:    MRI brain, MRA head,     02/13/17 19:50:00    Accession# [id number]      CLINICAL INDICATION: 77 year old M with stroke     TECHNIQUE: Multiplanar multisequence MR imaging of the brain was performed without intravenous contrast.  Examination performed in conjunction with a 3-D time-of-flight noncontrast MR angiogram of the intracranial vasculature.  Multiple MIP reconstructions were performed.    COMPARISON: No priors.    FINDINGS:  There is a large infarct of the left parietal lobe extending anteriorly to involve the very posterior sulci of the left frontal and temporal lobes. Otherwise, there are a few scattered T2 hyperintensities in the periventricular white matter. Possible remote infarct of the right centrum semi-ovale. The no intracranial blood products. No significant mass effect. No midline shift. Visualized portions of the calvarium and sinuses show no focal abnormality.    MRA:   Visualized portions of the carotid arteries and vertebral arteries are normal in course and caliber. Basilar artery is widely patent. Posterior circulation is intact. Bilateral posterior communicating arteries are intact.    There is mild irregular stenoses of the bilateral MCA. There is occlusion of the superior branch of the M2 segments of the MCA.            X-Ray Abdomen AP 1 View (Final result) Result time:  02/13/17 17:39:18    Final result by Rosy Hicks MD (02/13/17 17:39:18)    Impression:     As above.      Electronically signed by: ROSY HICKS MD, MD  Date:     02/13/17  Time:    17:39     Narrative:    Comparison: Abdominal radiograph earlier same day at 1610 hrs.    Findings: Single AP portable semiupright view of the mid to upper abdomen which includes the lower chest. The lower  abdomen and pelvis are not included in field-of-view. Enteric tube appears to have been retracted with port and tip projected over the medial left upper quadrant, likely within the proximal stomach. Otherwise, no significant interval change of the included lower chest and upper abdomen.            X-Ray Abdomen AP 1 View (Final result) Result time:  02/13/17 16:45:53    Final result by Anson Garcia DO (02/13/17 16:45:53)    Impression:     See Above .      Electronically signed by: ANSON GARCIA DO  Date:     02/13/17  Time:    16:45     Narrative:    Technique: Supine view of the upper abdomen    Comparison: 02/13/2017 at 1606    Results:Interval placement of a feeding tube with tip projected over the right upper abdomen in the region of the expected gastric pylorus/proximal duodenum. External monitors overlie the lower thorax and left mid abdomen. Continued few scattered gas-filled loops of bowel within the upper abdomen.            X-Ray Abdomen AP 1 View (Final result) Result time:  02/13/17 16:41:56    Final result by Anson Garcia DO (02/13/17 16:41:56)    Impression:     See Above .      Electronically signed by: ANSON GARCIA DO  Date:     02/13/17  Time:    16:41     Narrative:    Technique: Supine view the abdomen    Comparison: None    Results:Scattered gas-filled loops of bowel within the abdomen with gas and fecal material projected over the visualized colon. Overall nonspecific bowel gas pattern. No feeding tube projected over the visualized mediastinum or upper abdomen. Clinical correlation advised. Multiple external leads project over the lower thorax.              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 Atrial Fibrillation              2/14/2017: Diagnosed with paroxysmal atrial fibrillation.              [id number]=2 (HA2S2V)              2/14/2017: Began amiodarone iv and metoprolol 50 mg Q8.              On amiodarone to 200 mg Q6.   Will reduce metoprolol to 50 mg Q12.     2. Stroke Prevention              On aspirin 81 mg Q24 and clopidogrel 75 mg Q24.              Would not favor anticoagulation at present.     3. Cerebrovascular Accident              Imaging tests reveal lacunar infarct.        VTE Risk Mitigation         Ordered     heparin (porcine) injection 5,000 Units  Every 8 hours     Route:  Subcutaneous        02/13/17 1421     Medium Risk of VTE  Once      02/13/17 1421     Place sequential compression device  Until discontinued      02/13/17 1421          Anticipated Disposition: Long Term Care    Discharge Needs: Not clear at the present time.    Nicanor Freitas MD  Cardiology  [facility]

## 2017-02-17 NOTE — PLAN OF CARE
Patient Name: Esteban Goins  MRN: [id number]  Patient Class: IP- Inpatient   Admission Date: 2/13/2017  Length of Stay: 4 days  Attending Physician: Cristy Cullen MD  Primary Care Provider: [facility] [place]       Discharge Planning:  Chart reviewed  Care plan discussed  Moved from ICU bed 3 to room 315     Discussed care plan with treatment team  Discussed care plan with the attending Dr Cullen  Current dispo -pending, PEG placement in 5-7 days (surgery scheduled next week due to plavix dose)  Case management to follow  Consults following are: neurology, PT, OT, ST, case mgt, GI, cardiology.

## 2017-02-17 NOTE — PROGRESS NOTES
[facility]  Gastroenterology  Progress Note    Patient Name: Esteban Goins  MRN: [id number]  Admission Date: 2/13/2017  Hospital Length of Stay: 4 days  Code Status: Full Code   Attending Provider: Cristy Cullen MD  Consulting Provider: Aj Anders MD  Primary Care Physician: [facility] [place]  Principal Problem: Acute ischemic left MCA stroke    Last Recorded LACE+ Scores     None        Subjective:     Interval History: No new problems reported.  Tolerating bolus feeds    Review of Systems   Unable to perform ROS: Mental status change     Objective:     Vital Signs (Most Recent):  Temp: 98 °F (36.7 °C) (02/17/17 0701)  Pulse: 74 (02/17/17 0800)  Resp: (!) 23 (02/17/17 0800)  BP: (!) 189/79 (02/17/17 0800)  SpO2: 95 % (02/17/17 0800) Vital Signs (24h Range):  Temp:  [98 °F (36.7 °C)-98.6 °F (37 °C)] 98 °F (36.7 °C)  Pulse:  [58-74] 74  Resp:  [22-31] 23  SpO2:  [94 %-98 %] 95 %  BP: (131-189)/() 189/79     Weight: 76.9 kg (169 lb 8.5 oz) (02/17/17 0500)  Body mass index is 24.33 kg/(m^2).      Intake/Output Summary (Last 24 hours) at 02/17/17 0859  Last data filed at 02/17/17 0830   Gross per 24 hour   Intake          1733.89 ml   Output              775 ml   Net           958.89 ml       Lines/Drains/Airways     Drain                 NG/OG Tube 02/13/17 1455 nasogastric Right nostril 3 days         Urethral Catheter 02/13/17 1015 3 days          Peripheral Intravenous Line                 Peripheral IV - Single Lumen 02/14/17 1455 Right Antecubital 2 days                Physical Exam   Cardiovascular: Normal rate and regular rhythm.    Pulmonary/Chest: Breath sounds normal.   Abdominal: Soft. He exhibits no distension. There is no tenderness.       Significant Labs:  CBC:   Recent Labs  Lab 02/16/17  0443 02/17/17  0339   WBC 11.82 12.82*   HGB 11.0* 10.4*   HCT 34.8* 33.5*    255     CMP:   Recent Labs  Lab 02/17/17  0339   *   CALCIUM 8.3*       K 4.0   CO2 18*   *   BUN 53*   CREATININE 3.1*             Assessment/Plan:     Active Diagnoses:    Diagnosis Date Noted POA    PRINCIPAL PROBLEM:  Acute ischemic left MCA stroke [I63.512] 02/13/2017 Yes    Paroxysmal atrial fibrillation [I48.0] 02/14/2017 Yes    Hemiparesis, right [G81.91] 02/13/2017 Yes    Oral phase dysphagia [R13.11] 02/13/2017 Yes    Aphasia [R47.01] 02/13/2017 Yes    HTN (hypertension), malignant [I10] 02/13/2017 Yes    Iron deficiency anemia [D50.9] 02/13/2017 Yes    CKD (chronic kidney disease), stage III [N18.3] 02/13/2017 Yes    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  Awaiting family decision.    He will need to be off clopidogrel for 5-7 days before PEG placement.      Discussed with Dr. Cullen    Thank you for your consult.     Aj Anders MD  Gastroenterology  [facility]

## 2017-02-17 NOTE — PROGRESS NOTES
[facility]  Cardiology  Progress Note    Patient Name: Esteban Goins  MRN: [id number]  Admission Date: 2/13/2017  Hospital Length of Stay: 4 days  Code Status: Full Code   Attending Physician: Cristy Cullen MD   Primary Care Physician: [facility] [place]  Expected Discharge Date:   Principal Problem:Acute ischemic left MCA stroke    Subjective:     Brief HPI:    78 yo man with hypertension. Presents after a cerebrovascular accident with right sided weakness on 2/13/2017. On 2/14/2017 he went into atrial fibrillation and was begun on amiodarone for rate control.    Hospital Course:     2/14/2017: Began amiodarone.    Interval History:     In sinus rhythm.    Review of Systems   Unable to perform ROS: patient nonverbal     Objective:     Vital Signs (Most Recent):  Temp: 98 °F (36.7 °C) (02/17/17 0701)  Pulse: 68 (02/17/17 0715)  Resp: (!) 31 (02/17/17 0715)  BP: (!) 167/70 (02/17/17 0701)  SpO2: 95 % (02/17/17 0715) Vital Signs (24h Range):  Temp:  [98 °F (36.7 °C)-98.6 °F (37 °C)] 98 °F (36.7 °C)  Pulse:  [58-74] 68  Resp:  [22-31] 31  SpO2:  [94 %-98 %] 95 %  BP: (131-189)/() 167/70     Weight: 76.9 kg (169 lb 8.5 oz)  Body mass index is 24.33 kg/(m^2).    SpO2: 95 %  O2 Device (Oxygen Therapy): room air      Intake/Output Summary (Last 24 hours) at 02/17/17 0753  Last data filed at 02/17/17 0701   Gross per 24 hour   Intake          1493.89 ml   Output              775 ml   Net           718.89 ml       Lines/Drains/Airways     Drain                 NG/OG Tube 02/13/17 1455 nasogastric Right nostril 3 days         Urethral Catheter 02/13/17 1015 3 days          Peripheral Intravenous Line                 Peripheral IV - Single Lumen 02/14/17 1455 Right Antecubital 2 days         Peripheral IV - Single Lumen 02/15/17 Right Forearm 1 day                Physical Exam   Constitutional: No distress.   Neck: No JVD present. No thyromegaly present.   Cardiovascular: Normal  rate, regular rhythm, S1 normal and S2 normal.  Exam reveals gallop and S4.    Pulmonary/Chest: Effort normal. He has rhonchi.   Abdominal: Normal appearance. There is no tenderness.   Skin: Skin is warm and dry.     Current Medications:     allopurinol  200 mg Oral Daily    amiodarone  200 mg Oral QID (WM & HS)    aspirin  81 mg Oral Daily    atorvastatin  40 mg Oral Daily    clopidogrel  75 mg Oral Daily    famotidine (PF)  20 mg Intravenous BID    heparin (porcine)  5,000 Units Subcutaneous Q8H    hydrALAZINE  25 mg Oral Q8H    insulin detemir  10 Units Subcutaneous BID    metoprolol tartrate  50 mg Oral TID    nifedipine 30 MG ORAL TR24  60 mg Oral Daily     Current Laboratory Results:    Recent Results (from the past 24 hour(s))   POCT glucose    Collection Time: 02/16/17 11:51 AM   Result Value Ref Range    POCT Glucose 315 (H) 70 - 110 mg/dL   POCT glucose    Collection Time: 02/16/17  5:58 PM   Result Value Ref Range    POCT Glucose 173 (H) 70 - 110 mg/dL   POCT glucose    Collection Time: 02/16/17 11:51 PM   Result Value Ref Range    POCT Glucose 182 (H) 70 - 110 mg/dL   CBC auto differential    Collection Time: 02/17/17  3:39 AM   Result Value Ref Range    WBC 12.82 (H) 3.90 - 12.70 K/uL    RBC 5.47 4.60 - 6.20 M/uL    Hemoglobin 10.4 (L) 14.0 - 18.0 g/dL    Hematocrit 33.5 (L) 40.0 - 54.0 %    MCV 61 (L) 82 - 98 fL    MCH 19.0 (L) 27.0 - 31.0 pg    MCHC 31.0 (L) 32.0 - 36.0 %    RDW 15.2 (H) 11.5 - 14.5 %    Platelets 255 150 - 350 K/uL    MPV SEE COMMENT 9.2 - 12.9 fL    Lymph # CANCELED 1.0 - 4.8 K/uL    Mono # CANCELED 0.3 - 1.0 K/uL    Eos # CANCELED 0.0 - 0.5 K/uL    Baso # CANCELED 0.00 - 0.20 K/uL    Gran% 71.0 38.0 - 73.0 %    Lymph% 9.0 (L) 18.0 - 48.0 %    Mono% 15.0 4.0 - 15.0 %    Eosinophil% 3.0 0.0 - 8.0 %    Basophil% 1.0 0.0 - 1.9 %    Bands 1.0 %    Platelet Estimate Appears normal     Aniso Slight     Poik Slight     Poly Occasional     Ovalocytes Occasional     Large/Giant  Platelets Present     Fragmented Cells Occasional     Differential Method Manual    Basic metabolic panel    Collection Time: 02/17/17  3:39 AM   Result Value Ref Range    Sodium 142 136 - 145 mmol/L    Potassium 4.0 3.5 - 5.1 mmol/L    Chloride 112 (H) 95 - 110 mmol/L    CO2 18 (L) 23 - 29 mmol/L    Glucose 222 (H) 70 - 110 mg/dL    BUN, Bld 53 (H) 8 - 23 mg/dL    Creatinine 3.1 (H) 0.5 - 1.4 mg/dL    Calcium 8.3 (L) 8.7 - 10.5 mg/dL    Anion Gap 12 8 - 16 mmol/L    eGFR if African American 21 (A) >60 mL/min/1.73 m^2    eGFR if non African American 18 (A) >60 mL/min/1.73 m^2   POCT glucose    Collection Time: 02/17/17  5:57 AM   Result Value Ref Range    POCT Glucose 274 (H) 70 - 110 mg/dL     Current Imaging Results:    Imaging Results         X-Ray Chest 1 View (Final result) Result time:  02/16/17 09:18:52    Final result by Parker Padron Jr., MD (02/16/17 09:18:52)    Narrative:    Chest single view compared to February 14.  NG tube directed toward the gastric antrum.  Increased opacification at the bases likely some pleural fluid left greater than right.  Heart remains enlarged.  No pneumothorax.    Impression NG tube in satisfactory position.      Electronically signed by: PARKER PADRON MD  Date:     02/16/17  Time:    09:18             X-Ray Chest AP Portable (Final result) Result time:  02/14/17 16:53:12    Final result by Anson Garcia DO (02/14/17 16:53:12)    Impression:        See Above       Electronically signed by: ANSON GARCIA DO  Date:     02/14/17  Time:    16:53     Narrative:    Single portable frontal view of the chest    Comparison: None    Results: Ill-defined right basilar opacity concerning for effusion with atelectasis. There is no large pneumothorax. Atherosclerotic aorta. Nasogastric tube identified with looping configuration the epigastric region may be in the gastric cardia or distal esophagus. Clinical correlation and advancement advised. No large lung consolidation.             MRI Brain Without Contrast (Final result) Result time:  02/13/17 21:01:31    Final result by Jose G Amor MD (02/13/17 21:01:31)    Impression:        Occlusion of the left superior division of the M2 segment of the MCA with resulting large left cerebral infarct involving the left parietal, temporal, and frontal cortices.      Electronically signed by: JOSE G AMOR MD  Date:     02/13/17  Time:    21:01     Narrative:    MRI brain, MRA head,     02/13/17 19:50:00    Accession# [id number]      CLINICAL INDICATION: 77 year old M with stroke     TECHNIQUE: Multiplanar multisequence MR imaging of the brain was performed without intravenous contrast.  Examination performed in conjunction with a 3-D time-of-flight noncontrast MR angiogram of the intracranial vasculature.  Multiple MIP reconstructions were performed.    COMPARISON: No priors.    FINDINGS:  There is a large infarct of the left parietal lobe extending anteriorly to involve the very posterior sulci of the left frontal and temporal lobes. Otherwise, there are a few scattered T2 hyperintensities in the periventricular white matter. Possible remote infarct of the right centrum semi-ovale. The no intracranial blood products. No significant mass effect. No midline shift. Visualized portions of the calvarium and sinuses show no focal abnormality.    MRA:   Visualized portions of the carotid arteries and vertebral arteries are normal in course and caliber. Basilar artery is widely patent. Posterior circulation is intact. Bilateral posterior communicating arteries are intact.    There is mild irregular stenoses of the bilateral MCA. There is occlusion of the superior branch of the M2 segments of the MCA.            MRA Brain (Final result) Result time:  02/13/17 21:01:31    Final result by Jose G Amor MD (02/13/17 21:01:31)    Impression:        Occlusion of the left superior division of the M2 segment of the MCA with resulting large left cerebral  infarct involving the left parietal, temporal, and frontal cortices.      Electronically signed by: SERENA COSTA MD  Date:     02/13/17  Time:    21:01     Narrative:    MRI brain, MRA head,     02/13/17 19:50:00    Accession# [id number]      CLINICAL INDICATION: 77 year old M with stroke     TECHNIQUE: Multiplanar multisequence MR imaging of the brain was performed without intravenous contrast.  Examination performed in conjunction with a 3-D time-of-flight noncontrast MR angiogram of the intracranial vasculature.  Multiple MIP reconstructions were performed.    COMPARISON: No priors.    FINDINGS:  There is a large infarct of the left parietal lobe extending anteriorly to involve the very posterior sulci of the left frontal and temporal lobes. Otherwise, there are a few scattered T2 hyperintensities in the periventricular white matter. Possible remote infarct of the right centrum semi-ovale. The no intracranial blood products. No significant mass effect. No midline shift. Visualized portions of the calvarium and sinuses show no focal abnormality.    MRA:   Visualized portions of the carotid arteries and vertebral arteries are normal in course and caliber. Basilar artery is widely patent. Posterior circulation is intact. Bilateral posterior communicating arteries are intact.    There is mild irregular stenoses of the bilateral MCA. There is occlusion of the superior branch of the M2 segments of the MCA.            X-Ray Abdomen AP 1 View (Final result) Result time:  02/13/17 17:39:18    Final result by Rosy Hicks MD (02/13/17 17:39:18)    Impression:     As above.      Electronically signed by: ROSY HICKS MD, MD  Date:     02/13/17  Time:    17:39     Narrative:    Comparison: Abdominal radiograph earlier same day at 1610 hrs.    Findings: Single AP portable semiupright view of the mid to upper abdomen which includes the lower chest. The lower abdomen and pelvis are not included in field-of-view. Enteric  tube appears to have been retracted with port and tip projected over the medial left upper quadrant, likely within the proximal stomach. Otherwise, no significant interval change of the included lower chest and upper abdomen.            X-Ray Abdomen AP 1 View (Final result) Result time:  02/13/17 16:45:53    Final result by Anson Garcia DO (02/13/17 16:45:53)    Impression:     See Above .      Electronically signed by: ANSON GARCIA DO  Date:     02/13/17  Time:    16:45     Narrative:    Technique: Supine view of the upper abdomen    Comparison: 02/13/2017 at 1606    Results:Interval placement of a feeding tube with tip projected over the right upper abdomen in the region of the expected gastric pylorus/proximal duodenum. External monitors overlie the lower thorax and left mid abdomen. Continued few scattered gas-filled loops of bowel within the upper abdomen.            X-Ray Abdomen AP 1 View (Final result) Result time:  02/13/17 16:41:56    Final result by Anson Garcia DO (02/13/17 16:41:56)    Impression:     See Above .      Electronically signed by: ANSON GARCIA DO  Date:     02/13/17  Time:    16:41     Narrative:    Technique: Supine view the abdomen    Comparison: None    Results:Scattered gas-filled loops of bowel within the abdomen with gas and fecal material projected over the visualized colon. Overall nonspecific bowel gas pattern. No feeding tube projected over the visualized mediastinum or upper abdomen. Clinical correlation advised. Multiple external leads project over the lower thorax.              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 Atrial Fibrillation              2/14/2017: Diagnosed with paroxysmal atrial fibrillation.              [id number] (HA2S2V)              2/14/2017: Began amiodarone iv and metoprolol 50 mg Q8.              On amiodarone to 200 mg Q6 and metoprolol to 50 mg Q12.   No further atrial fibrillation.     2. Stroke Prevention              On aspirin 81 mg Q24 and clopidogrel 75 mg Q24.              Would not favor anticoagulation at present.     3. Cerebrovascular Accident              Imaging tests reveal lacunar infarct.        VTE Risk Mitigation         Ordered     heparin (porcine) injection 5,000 Units  Every 8 hours     Route:  Subcutaneous        02/13/17 1421     Medium Risk of VTE  Once      02/13/17 1421     Place sequential compression device  Until discontinued      02/13/17 1421          Anticipated Disposition: Long Term Care    Discharge Needs: Not clear at the present time.    Nicanor Freitas MD  Cardiology  [facility]

## 2017-02-17 NOTE — ASSESSMENT & PLAN NOTE
- Speech following and recommend NPO status with tube feeding.  - Secretions evident due to inability to handle them   - GI consulted for PEG but will need family consent - hopefully this will not be permanent and he can regain some function in rehab.

## 2017-02-17 NOTE — PLAN OF CARE
02/17/17 1707   Discharge Reassessment   Assessment Type Discharge Planning Reassessment   Can the patient answer the patient profile reliably? No, cognitively impaired   How does the patient rate their overall health at the present time? Fair   Describe the patient's ability to walk at the present time. Major restrictions/daily assistance from another person   How often would a person be available to care for the patient? Whenever needed   Number of comorbid conditions (as recorded on the chart) Four   During the past month, has the patient often been bothered by feeling down, depressed or hopeless? No   During the past month, has the patient often been bothered by little interest or pleasure in doing things? No   Provided patient/caregiver education on the expected discharge date and the discharge plan Yes   Discharge Plan A Rehab   Discharge Plan B Skilled Nursing Facility   Change in patient condition or support system No   Explained to the the patient/caregiver why the discharge planned changed: Yes   Involved the patient/caregiver in establishing a new discharge plan: Yes

## 2017-02-17 NOTE — PLAN OF CARE
Problem: Patient Care Overview  Goal: Plan of Care Review  Outcome: Ongoing (interventions implemented as appropriate)  Patient remains on room air with adequate saturation.

## 2017-02-17 NOTE — ASSESSMENT & PLAN NOTE
- Metoprolol increased to 50 mg tid  - Lisinopril discontinued given worsening renal function and hydralazine and nifedipine added.  - Gradually improving.  - Will need lisinopril eventually.

## 2017-02-17 NOTE — PLAN OF CARE
Problem: Occupational Therapy Goal  Goal: Occupational Therapy Goal  Goals to be met by: 2/24/2017    Patient will increase functional independence with ADLs by performing:    Grooming while bedlevel with Moderate Assistance.  Sitting at edge of bed x20 minutes with Stand-by Assistance.  Supine to sit with Maximum Assistance.  Stand pivot transfers assessment without distress.  Increased functional strength to 2/5 for RUE.  RUE Upper extremity exercise program 10 reps per handout, with assistance as needed.   Outcome: Ongoing (interventions implemented as appropriate)  Pt continues to present with decreased initiation, following of commands, attention to R field, sitting balance and tolerance. Pt completed bed mobility bilaterally with total assistance.  He tolerated sitting EOB with support at trunk and input at scapula and following down RLE. Pt became agitated and combative x1 this session with KIKI. Pt redirected and returned supine to bed. Pt required total assist x2 persons with bed mobility, scooting and bridging, weight shifting and EOB tasks.  Pt would benefit from skilled occupational therapy intervention for increased activity tolerance and independence in ADL's.

## 2017-02-17 NOTE — PT/OT/SLP PROGRESS
Physical Therapy  Treatment    Esteban Goins   MRN: [id number]   Admitting Diagnosis: Acute ischemic left MCA stroke    PT Received On: 02/17/17  PT Start Time: 1332     PT Stop Time: 1400    PT Total Time (min): 28 min       Billable Minutes:  Therapeutic Activity 10, Therapeutic Exercise 8 and Neuromuscular Re-education 10    Treatment Type: Treatment  PT/PTA: PT     PTA Visit Number: 0       General Precautions: Standard,  (fall and aspiration risk)  Orthopedic Precautions: N/A   Braces:      Do you have any cultural, spiritual, Alevism conflicts, given your current situation?: unable to assess; pt nonverbal    Subjective:  Communicated with nurse prior to session.  Pt groaning most of session. Some gesturing. Became agitated near end session.     Pain Rating Post-Intervention:  (Unable to verbalize, locate, or quantify pain. Groaning throughout session.)    Objective:    Pt found supine in bed with HOB elevated.     Functional Mobility:  Bed Mobility:   Rolling/Turning Right: Maximum assistance  Scooting/Bridging: Total Assistance, With assist of 2 (with drawsheet; pt attempted to lift hips to bridge with assist at R LE to maintain hooklying, however unable to clear hips from bed)  Supine to Sit: Total Assistance (1 person)  Sit to Supine: Total Assistance, With assist of  2 (assist at trunk and LEs)      Balance:   Static Sit: 1 person total assist at R side due to tendency for R lateral lean with occasional pushing through L UE.   Dynamic Sit: 1 person total assist for lateral weight shifting. Assisted with R UE and LE placement for distal>proximal proprioceptive input. Intermittent manual input at shoulder and scapula.       Therapeutic Activities and Exercises:  Pt performed supine therapeutic exercises with active assist including ankle pumps, heels slides, hip abd/add with knee extended as well as in hooklying, hip internal/external rotation x 10 reps with verbal and tactile cues. Noted slightly increased  tone today compared to flaccid LE in previous sessions.   Pt performed supine PNF D1/D2 at R UE and R LE x 10 reps each.          AM-PAC 6 CLICK MOBILITY  How much help from another person does this patient currently need?   1 = Unable, Total/Dependent Assistance  2 = A lot, Maximum/Moderate Assistance  3 = A little, Minimum/Contact Guard/Supervision  4 = None, Modified [place]/Independent    Turning over in bed (including adjusting bedclothes, sheets and blankets)?: 1  Sitting down on and standing up from a chair with arms (e.g., wheelchair, bedside commode, etc.): 1  Moving from lying on back to sitting on the side of the bed?: 1  Moving to and from a bed to a chair (including a wheelchair)?: 1  Need to walk in hospital room?: 1  Climbing 3-5 steps with a railing?: 1  Total Score: 6    AM-PAC Raw Score CMS G-Code Modifier Level of Impairment Assistance   6 % Total / Unable   7 - 9 CM 80 - 100% Maximal Assist   10 - 14 CL 60 - 80% Moderate Assist   15 - 19 CK 40 - 60% Moderate Assist   20 - 22 CJ 20 - 40% Minimal Assist   23 CI 1-20% SBA / CGA   24 CH 0% Independent/ Mod I     Patient left supine with all lines intact, call button in reach, nurse notified and OT present.    Assessment:  Esteban Goins is a 77 y.o. male with a medical diagnosis of Acute ischemic left MCA stroke. Slow progress towards goals, activity tolerance continues to improve. More animated, gesturing and groaning this session. Despite co-morbidities prior to admission pt was modified independent with mobility and self-care and there is expectation of returning to prior level of function to maintain independence avoiding readmission. Pt is at high risk of unplanned readmission due to fall risk and lack of 24 hour caregiver in prior setting.  Pt's clinical condition meets full inpatient rehab criteria. The lower level of care (SNF) cannot provide to total interdisciplinary treatment approach needed. Pt would benefit from continued  skilled PT to maximize independence and safety with functional mobility.        Rehab identified problem list/impairments: Rehab identified problem list/impairments: weakness, impaired self care skills, impaired functional mobilty, impaired sensation, visual deficits, impaired balance, gait instability, decreased lower extremity function, decreased upper extremity function, decreased ROM, impaired coordination, decreased safety awareness, abnormal tone    Rehab potential is fair.    Activity tolerance: Good    Discharge recommendations: Discharge Facility/Level Of Care Needs: rehabilitation facility     Barriers to discharge: Barriers to Discharge: Decreased caregiver support, Inaccessible home environment (based on current functional level)    Equipment recommendations:       GOALS:   Physical Therapy Goals        Problem: Physical Therapy Goal    Goal Priority Disciplines Outcome Goal Variances Interventions   Physical Therapy Goal     PT/OT, PT Ongoing (interventions implemented as appropriate)     Description:  Goals to be met by: 3/14/17     Patient will increase functional independence with mobility by performin. Supine to sit with min A.  2. Sit to supine with min A.   3. Rolling R and L with min A.   4. Sitting at edge of bed >5 minutes with min A.   5. PT will assess sit<>stand.                 PLAN:    Patient to be seen daily  to address the above listed problems via gait training, therapeutic activities, therapeutic exercises, neuromuscular re-education  Plan of Care expires: 17  Plan of Care reviewed with: patient         Nely Velasquez, PT  2017

## 2017-02-17 NOTE — SUBJECTIVE & OBJECTIVE
Interval History:  Still not following commands.  Tube feeding pump not functioning so bolus feeding was implemented and was well tolerated, although he continues to have considerable secretions and clearly does not have muscle tone in his throat.  Family to arrive today and will make decision regarding PEG tube.    Review of Systems   Unable to perform ROS: Patient nonverbal     Objective:     Vital Signs (Most Recent):  Temp: 98 °F (36.7 °C) (02/17/17 0701)  Pulse: 68 (02/17/17 0715)  Resp: (!) 31 (02/17/17 0715)  BP: (!) 167/70 (02/17/17 0701)  SpO2: 95 % (02/17/17 0715) Vital Signs (24h Range):  Temp:  [98 °F (36.7 °C)-98.6 °F (37 °C)] 98 °F (36.7 °C)  Pulse:  [58-74] 68  Resp:  [22-31] 31  SpO2:  [94 %-98 %] 95 %  BP: (131-189)/() 167/70     Weight: 76.9 kg (169 lb 8.5 oz)  Body mass index is 24.33 kg/(m^2).    Intake/Output Summary (Last 24 hours) at 02/17/17 0758  Last data filed at 02/17/17 0701   Gross per 24 hour   Intake          1493.89 ml   Output              775 ml   Net           718.89 ml      Physical Exam   Constitutional: He appears well-developed and well-nourished.   HENT:   Head: Normocephalic and atraumatic.   Edentulous.  Tongue appears dry.  Secretions evident in back of throat.   Eyes: Conjunctivae are normal. Pupils are equal, round, and reactive to light.   Squints his left eye.   Neck: Normal range of motion. Neck supple. No thyromegaly present.   Cardiovascular:   Irregularly irregular rhythm with HR in the 60's.   Pulmonary/Chest: He has no wheezes. He has no rales.   Breathing unlabored, unable to take a deep breath on command.  Coarse upper airway noise.   Abdominal: Soft. Bowel sounds are normal. He exhibits no distension. There is no tenderness.   Musculoskeletal:   Left hand deformed with only the thumb and 3rd digit remaining, heavily scarred.     Neurological: He is alert.   Paralyzed on the right.     Skin: Skin is warm and dry.       Significant Labs: All pertinent labs  within the past 24 hours have been reviewed.    Significant Imaging: I have reviewed all pertinent imaging results/findings within the past 24 hours.

## 2017-02-17 NOTE — PLAN OF CARE
Problem: Patient Care Overview  Goal: Plan of Care Review  Outcome: Ongoing (interventions implemented as appropriate)  NO BM'S OVER NIGHT. NOW WITH SOME GROSS MOTOR MOVEMENTS RO RUE. VSS. REQUIRED TRANDATE. ONCE.

## 2017-02-17 NOTE — PLAN OF CARE
Problem: Physical Therapy Goal  Goal: Physical Therapy Goal  Goals to be met by: 3/14/17     Patient will increase functional independence with mobility by performin. Supine to sit with min A.  2. Sit to supine with min A.   3. Rolling R and L with min A.   4. Sitting at edge of bed >5 minutes with min A.   5. PT will assess sit<>stand.    Outcome: Ongoing (interventions implemented as appropriate)  Slow progress towards goals, activity tolerance continues to improve. More animated, gesturing and groaning this session. Will continue to follow and progress as tolerated. Please see progress note for detailed plan of care and recommendations.

## 2017-02-17 NOTE — PT/OT/SLP PROGRESS
Speech Language Pathology  Treatment    Esteban Goins   MRN: [id number]   Admitting Diagnosis: Acute ischemic left MCA stroke    Diet recommendations: Solid Diet Level: NPO  Liquid Diet Level: NPO Oral feeding trials in speech therapy only at this time. Continue NG tube feedings as main source of nutrition and hydration.     SLP Treatment Date: 02/17/17  Speech Start Time: 0935     Speech Stop Time: 0950     Speech Total (min): 15 min       TREATMENT BILLABLE MINUTES:  Treatment Swallowing Dysfunction 15 min    General Precautions: Standard, aphasia, aspiration, fall  Current Respiratory Status: nasal cannula       Subjective:  Pt sleeping in bed. Scheduled for transport to floor this date       Objective:   COGNITIVE COMMUNICATION: Pt required repeated presentations of verbal and tactile cues to achieve alertness. Dcr ability to maintain wakefulness. Required repeated presentations to maintain wakefulness for  1-2 min intervals. Dcr focused and sustained attention on examiner, eye contact less than 10% of time. Able to sustain attention to a simple functional task for less than 2-3 secs.  Severe receptive and expressive language deficits, global aphasia. Unable to attend to or follow simple 1 step commands. No yes/no modality elicited.  Verbal expression is non fluent and primarily characterized by primitive vocalizations (moaning, grunting).  Able to refuse activity (oral care, trials of ice chips) via pushing therapist hand away, head turn and growling. Attempting to use vague, non specific gestures.    SWALLOWING: Lips and tongue dry and cracked, opened mouth resting posture, dcr lip seal, tongue protrusion demonstrated. Oral care and moisture provided. Pt trialed on ice chips and 1-2 mls of water. Severely dcr lip seal, no active labial and or lingual movements to manipulate bolus. All trials spilled from oral cavity, with no formation of cohesive bolus and no a-p transport. Wet gurlgly vocal quality due to dcr  tolerance of secretions with reduce rate of spontaneous saliva swallows. Able to stimulate trigger of oral and pharyngeal swallow x2 with oral care, which briefly cleared vocal quality with swallow.     Assessment:  Esteban Goins is a 77 y.o. male with a medical diagnosis of Acute ischemic left MCA stroke and presents with severe to profound dysphagia and receptive and expressive language deficits/aphasia. Continue NG tube feedings. NOt appropriate for MBS at this time due to dcr ability to actively participate. Total dependence on caregiver to anticipate his wants and needs.    Discharge recommendations: Discharge Facility/Level Of Care Needs: rehabilitation facility     Goals:   SLP Goals        Problem: SLP Goal    Goal Priority Disciplines Outcome   SLP Goal     SLP           Problem: SLP Goal    Goal Priority Disciplines Outcome   SLP Goal     SLP    Description:  1.  The pt will follow 2-3 simple commands per session, given max visual/verbal cues, and structured environment to facilitate response.  2. The pt will participate in any simple automatic speech task x1 per session, given max multimodal cues.  3. The pt will demonstrate yes/no response x3 per session via any modality, given max multimodal cues.               Plan:   Patient to be seen Therapy Frequency: 5 x/week   Plan of Care expires: 03/16/17  Plan of Care reviewed with: patient  SLP Follow-up?: Yes              Judith Light CCC-SLP  02/17/2017

## 2017-02-17 NOTE — ASSESSMENT & PLAN NOTE
- Patient in NSR on arrival but now in rate controlled atrial fibrillation.  - Cardiologist following and transitioning patient to oral amiodarone.  - Continue beta blocker.

## 2017-02-17 NOTE — PT/OT/SLP PROGRESS
Occupational Therapy  Treatment    Esteban Goins   MRN: [id number]   Admitting Diagnosis: Acute ischemic left MCA stroke    OT Date of Treatment: 02/17/17   OT Start Time: 1353  OT Stop Time: 1409  OT Total Time (min): 16 min    Billable Minutes:  Therapeutic Activity 8 and Neuromuscular Re-education 8    General Precautions: Standard, aspiration, fall  Orthopedic Precautions: N/A  Braces: N/A    Do you have any cultural, spiritual, Episcopal conflicts, given your current situation?: none stated -pt nonverbal    Subjective:  Communicated with nursing prior to session.  Pt present with PT sitting EOB.                         Objective:  Patient found with: NG tube, ward catheter     Functional Mobility:  Bed Mobility:  Rolling/Turning to Left: Total assistance  Rolling/Turning Right: Total assistance  Sit to Supine: Total Assistance, With assist of 2    Transfers:                       Balance:   Static Sit: 0: Needs MAXIMAL assist to maintain sitting without back support  Dynamic Sit: POOR: N/A      Therapeutic Activities and Exercises:  Pt continues to present with decreased initiation, following of commands, attention to R field, sitting balance and tolerance. Pt completed bed mobility bilaterally with total assistance. He tolerated sitting EOB with support at trunk and input at scapula and following down RLE. Pt became agitated and combative x1 this session with ISABELE. Pt redirected and returned supine to bed. Pt required total assist x2 persons with bed mobility, scooting and bridging, weight shifting and EOB tasks.     AM-PAC 6 CLICK ADL   How much help from another person does this patient currently need?   1 = Unable, Total/Dependent Assistance  2 = A lot, Maximum/Moderate Assistance  3 = A little, Minimum/Contact Guard/Supervision  4 = None, Modified [place]/Independent    Putting on and taking off regular lower body clothing? : 1  Bathing (including washing, rinsing, drying)?: 1  Toileting, which includes  using toilet, bedpan, or urinal? : 1  Putting on and taking off regular upper body clothing?: 1  Taking care of personal grooming such as brushing teeth?: 1  Eating meals?: 1  Total Score: 6     AM-PAC Raw Score CMS G-Code Modifier Level of Impairment Assistance   6 % Total / Unable   7 - 9 CM 80 - 100% Maximal Assist   10 - 14 CL 60 - 80% Moderate Assist   15 - 19 CK 40 - 60% Moderate Assist   20 - 22 CJ 20 - 40% Minimal Assist   23 CI 1-20% SBA / CGA   24 CH 0% Independent/ Mod I     Patient left supine with call button in reach, restraints reapplied at end of session and nursing notified    ASSESSMENT:  Esteban Goins is a 77 y.o. male with a medical diagnosis of Acute ischemic left MCA stroke Pt continues to present with decreased initiation, following of commands, attention to R field, sitting balance and tolerance. Pt completed bed mobility bilaterally with total assistance. He tolerated sitting EOB with support at trunk and input at scapula and following down RLE. Pt became agitated and combative x1 this session with LUE. Pt redirected and returned supine to bed. Pt required total assist x2 persons with bed mobility, scooting and bridging, weight shifting and EOB tasks. Pt would benefit from skilled occupational therapy intervention for increased activity tolerance and independence in ADL's.    Rehab identified problem list/impairments: Rehab identified problem list/impairments: weakness, impaired endurance, impaired balance, decreased lower extremity function, decreased safety awareness, impaired cognition, impaired self care skills, impaired functional mobilty, decreased coordination, decreased upper extremity function, gait instability, decreased ROM, impaired fine motor    Rehab potential is fair.    Activity tolerance: Fair    Discharge recommendations: Discharge Facility/Level Of Care Needs: rehabilitation facility     Barriers to discharge: Barriers to Discharge: Inaccessible home environment,  Decreased caregiver support    Equipment recommendations:  (TBD)     GOALS:   Occupational Therapy Goals        Problem: Occupational Therapy Goal    Goal Priority Disciplines Outcome Interventions   Occupational Therapy Goal     OT, PT/OT Ongoing (interventions implemented as appropriate)    Description:  Goals to be met by: 2/24/2017    Patient will increase functional independence with ADLs by performing:    Grooming while bedlevel with Moderate Assistance.  Sitting at edge of bed x20  minutes with Stand-by Assistance.  Supine to sit with Maximum Assistance.  Stand pivot transfers assessment without distress.  Increased functional strength to 2/5 for RUE.  RUE Upper extremity exercise program  10 reps per handout, with assistance as needed.                Plan:  Patient to be seen 6 x/week to address the above listed problems via self-care/home management, therapeutic exercises, neuromuscular re-education, therapeutic activities  Plan of Care expires: 03/14/17  Plan of Care reviewed with: patient         Efraínkerri RUIZ Aristeo, OT  02/17/2017

## 2017-02-17 NOTE — PROGRESS NOTES
[facility] Hospital Medicine  Progress Note    Patient Name: Esteban Goins  MRN: [id number]  Patient Class: IP- Inpatient   Admission Date: 2/13/2017  Length of Stay: 4 days  Attending Physician: Cristy Cullen MD  Primary Care Provider: [facility]        Subjective:     Principal Problem:Acute ischemic left MCA stroke    HPI:  Mr. Goins is a 77 year old man who was found by his brother with aphasia and right hemiparesis, was brought to [facility] and found to have severe hypertension and evidence of a stroke.  He was transferred to [facility] for a higher level of care.  On initial evaluation the patient was nonverbal and unable to follow commands.  Right arm showed flaccid paralysis and right leg withdrew to painful stimuli.  Initial labs showed likely stage IV CKD with proteinuria and uncontrolled diabetes.      Hospital Course:  Patient was admitted to the ICU and was seen by the neurologist.  MRI/MRA showed an occlusion of the left superior division of a segment of the MCA resulting in a large left cerebral infarction involving the left parietal, temporal and frontal cortices.  Permissive hypertension was recommended for 24-48 hours.  He was unable to speak or swallow when evaluated by Speech and an NGT was placed.  Overnight he developed atrial fibrillation with a rapid ventricular response and was started on an amiodarone drip.  He transitioned to oral amiodarone and bolus tube feeding.                  Interval History:  Still not following commands.  Tube feeding pump not functioning so bolus feeding was implemented and was well tolerated, although he continues to have considerable secretions and clearly does not have muscle tone in his throat.  Family to arrive today and will make decision regarding PEG tube.    Review of Systems   Unable to perform ROS: Patient nonverbal     Objective:     Vital Signs (Most Recent):  Temp: 98 °F  (36.7 °C) (02/17/17 0701)  Pulse: 68 (02/17/17 0715)  Resp: (!) 31 (02/17/17 0715)  BP: (!) 167/70 (02/17/17 0701)  SpO2: 95 % (02/17/17 0715) Vital Signs (24h Range):  Temp:  [98 °F (36.7 °C)-98.6 °F (37 °C)] 98 °F (36.7 °C)  Pulse:  [58-74] 68  Resp:  [22-31] 31  SpO2:  [94 %-98 %] 95 %  BP: (131-189)/() 167/70     Weight: 76.9 kg (169 lb 8.5 oz)  Body mass index is 24.33 kg/(m^2).    Intake/Output Summary (Last 24 hours) at 02/17/17 0758  Last data filed at 02/17/17 0701   Gross per 24 hour   Intake          1493.89 ml   Output              775 ml   Net           718.89 ml      Physical Exam   Constitutional: He appears well-developed and well-nourished.   HENT:   Head: Normocephalic and atraumatic.   Edentulous.  Tongue appears dry.  Secretions evident in back of throat.   Eyes: Conjunctivae are normal. Pupils are equal, round, and reactive to light.   Squints his left eye.   Neck: Normal range of motion. Neck supple. No thyromegaly present.   Cardiovascular:   Irregularly irregular rhythm with HR in the 60's.   Pulmonary/Chest: He has no wheezes. He has no rales.   Breathing unlabored, unable to take a deep breath on command.  Coarse upper airway noise.   Abdominal: Soft. Bowel sounds are normal. He exhibits no distension. There is no tenderness.   Musculoskeletal:   Left hand deformed with only the thumb and 3rd digit remaining, heavily scarred.     Neurological: He is alert.   Paralyzed on the right.     Skin: Skin is warm and dry.       Significant Labs: All pertinent labs within the past 24 hours have been reviewed.    Significant Imaging: I have reviewed all pertinent imaging results/findings within the past 24 hours.    Assessment/Plan:      * Acute ischemic left MCA stroke  - Patient found down by brother, presented outside thrombolysis window.  - MRI/MRA with large left MCA stroke.  - 2D echo with diastolic dysfunction  - Continue speech/PT/OT  - ASA/Plavix/statin, blood pressure control  -  Inpatient rehab consulted      Hemiparesis, right  - Due to left MCA stroke.  - Continue therapy  - IP Rehab recommended.      Oral phase dysphagia  - Speech following and recommend NPO status with tube feeding.  - Secretions evident due to inability to handle them   - GI consulted for PEG but will need family consent - hopefully this will not be permanent and he can regain some function in rehab.    Aphasia  - Expressive and receptive.  - Patient is unable to speak or follow commands but makes eye contact.      HTN (hypertension), malignant  - Metoprolol increased to 50 mg tid  - Lisinopril discontinued given worsening renal function and hydralazine and nifedipine added.  - Gradually improving.  - Will need lisinopril eventually.    Iron deficiency anemia  - Appears to have iron deficiency in addition to anemia of chronic kidney disease.  - Iron studies show low iron stores with normal TIBC      CKD (chronic kidney disease), stage III  - Chronic kidney disease stage III-IV with uncertain baseline.  - Patient on allopurinol for hyperuricemia  - Proteinuria with diabetes as contributor.  - Lisinopril held for increasing creatinine but will eventually need ACE or ARB for proteinuria.  - Increase free water intake        Type 2 diabetes mellitus with diabetic chronic kidney disease  - Patient on glipizide at home - SSI while here and Levemir twice daily until bolus feeding is started.  - Increase long acting as needed  - HbA1c 9.7 and sister reports he was noncompliant with diabetes treatment.      Paroxysmal atrial fibrillation  - Patient in NSR on arrival but now in rate controlled atrial fibrillation.  - Cardiologist following and transitioning patient to oral amiodarone.  - Continue beta blocker.    VTE Risk Mitigation         Ordered     heparin (porcine) injection 5,000 Units  Every 8 hours     Route:  Subcutaneous        02/13/17 1421     Medium Risk of VTE  Once      02/13/17 1421     Place sequential  compression device  Until discontinued      02/13/17 1425          Cristy Maher MD  Department of Hospital Medicine   [facility]

## 2017-02-17 NOTE — PT/OT/SLP PROGRESS
Occupational Therapy  Treatment    Esteban Goins   MRN: [id number]   Admitting Diagnosis: Acute ischemic left MCA stroke    OT Date of Treatment: 02/16/17   OT Start Time: 1440  OT Stop Time: 1521  OT Total Time (min): 41 min    Billable Minutes:  Self Care/Home Management 1 and Therapeutic Activity 2  Co-treat with PT    General Precautions: Standard, aphasia, aspiration, fall  Orthopedic Precautions: N/A        Do you have any cultural, spiritual, Adventism conflicts, given your current situation?: none stated -pt nonverbal    Subjective:  Communicated with nursing prior to session.  Pt was present supine in bed with LUE restraint upon OT arrival.                        Objective:        Functional Mobility:  Bed Mobility:   supine <> sit: Total assistance x2   Bridging to HOB: Pt initiated bridging to HOB once supine after session. He grabbed rail with LUE initiated bridging with LLE however, Pt required total x2 to bridge to HOB and safety with LUE.   Pt required total A for rolling bilaterally.         Activities of Daily Living:     Pt required total assistance for swabbing mouth and suctioning. He grasped swab with LUE digits and with physical assistance brought to mouth however pt shifted face to resist stimuli.     Balance:   Static Sit: 0: Needs MAXIMAL assist to maintain sitting without back support  Dynamic Sit: POOR: N/A      Therapeutic Activities and Exercises:  Pt required max A with assistance of one therapist with weight shifting and bearing and min - mod A at trunk with second person for trunk for sitting balance and tolerance. Pt required multiple verbal and physical cues to visually attend to therapist and R visual field for brief seconds. He continues to require total assistance for bed mobility (rolling bilaterally and bridging/scooting) and sitting balance and tolerance.    AM-PAC 6 CLICK ADL   How much help from another person does this patient currently need?   1 = Unable, Total/Dependent  Assistance  2 = A lot, Maximum/Moderate Assistance  3 = A little, Minimum/Contact Guard/Supervision  4 = None, Modified [place]/Independent          AM-PAC Raw Score CMS G-Code Modifier Level of Impairment Assistance   6 % Total / Unable   7 - 9 CM 80 - 100% Maximal Assist   10 - 14 CL 60 - 80% Moderate Assist   15 - 19 CK 40 - 60% Moderate Assist   20 - 22 CJ 20 - 40% Minimal Assist   23 CI 1-20% SBA / CGA   24 CH 0% Independent/ Mod I     Patient left supine with all lines intact, call button in reach and nursing notified    ASSESSMENT:  Esteban Goins is a 77 y.o. male with a medical diagnosis of Acute ischemic left MCA stroke Pt continued to require total assist x2 persons with bed mobility, scooting and bridging, weight shifting and EOB tasks. Pt continues to presented with decreased initiation, following of commands, attention to R field, sitting balance and tolerance. He was unable to follow commands and maintain eye contact throughout session. He tolerated sitting EOB with max A at trunk and input at scapula and RUE input and LE light weight bearing. Pt would benefit from skilled occupational therapy intervention for increased activity tolerance and independence in ADL's.    Rehab identified problem list/impairments: Rehab identified problem list/impairments: weakness, impaired self care skills, impaired balance, decreased safety awareness, visual deficits, impaired sensation, impaired endurance, decreased upper extremity function, decreased lower extremity function, decreased ROM, impaired coordination, abnormal tone, impaired functional mobilty, impaired cognition, impaired fine motor    Rehab potential is good.    Activity tolerance: Good    Discharge recommendations: Discharge Facility/Level Of Care Needs: rehabilitation facility     Barriers to discharge: Barriers to Discharge: Inaccessible home environment, Decreased caregiver support    Equipment recommendations:  (TBD)     GOALS:    Occupational Therapy Goals        Problem: Occupational Therapy Goal    Goal Priority Disciplines Outcome Interventions   Occupational Therapy Goal     OT, PT/OT Ongoing (interventions implemented as appropriate)    Description:  Goals to be met by: 2/24/2017    Patient will increase functional independence with ADLs by performing:    Grooming while bedlevel with Moderate Assistance.  Sitting at edge of bed x20  minutes with Stand-by Assistance.  Supine to sit with Maximum Assistance.  Stand pivot transfers assessment without distress.  Increased functional strength to 2/5 for RUE.  RUE Upper extremity exercise program  10 reps per handout, with assistance as needed.                Plan:  Patient to be seen 6 x/week to address the above listed problems via self-care/home management, therapeutic activities, therapeutic exercises, neuromuscular re-education, cognitive retraining  Plan of Care expires: 03/14/17  Plan of Care reviewed with: patient         Efraínkerri RUIZ Aristeo, OT  02/17/2017

## 2017-02-17 NOTE — PLAN OF CARE
Problem: Occupational Therapy Goal  Goal: Occupational Therapy Goal  Goals to be met by: 2/24/2017    Patient will increase functional independence with ADLs by performing:    Grooming while bedlevel with Moderate Assistance.  Sitting at edge of bed x20 minutes with Stand-by Assistance.  Supine to sit with Maximum Assistance.  Stand pivot transfers assessment without distress.  Increased functional strength to 2/5 for RUE.  RUE Upper extremity exercise program 10 reps per handout, with assistance as needed.   Outcome: Ongoing (interventions implemented as appropriate)  Pt continued to require total assist x2 persons with bed mobility, scooting and bridging, weight shifting and EOB tasks. Pt continues to presented with decreased initiation, following of commands, attention to R field, sitting balance and tolerance. He was unable to follow commands and maintain eye contact throughout session.  He tolerated sitting EOB with max A at trunk and input at scapula and RUE input and LE light weight bearing. Pt would benefit from skilled occupational therapy intervention for increased activity tolerance and independence in ADL's.

## 2017-02-17 NOTE — ASSESSMENT & PLAN NOTE
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  - Patient on allopurinol for hyperuricemia  - Proteinuria with diabetes as contributor.  - Lisinopril held for increasing creatinine but will eventually need ACE or ARB for proteinuria.  - Increase free water intake

## 2017-02-17 NOTE — NURSING TRANSFER
Nursing Transfer Note      2/17/2017     Transfer:  To [facility] from ICU 3    Transfer via:  Stretcher    Transfer with:  Chart, medications, Diabetasource cans    Transported by:  Hospital transport    Medicines sent: NovoLog, Levemir, Labetalol     Chart send with patient: Yes    Notified: Chelo    Patient reassessed at: 0900 2/17/17    Upon arrival to floor:

## 2017-02-17 NOTE — ASSESSMENT & PLAN NOTE
- Patient found down by brother, presented outside thrombolysis window.  - MRI/MRA with large left MCA stroke.  - 2D echo with diastolic dysfunction  - Continue speech/PT/OT  - ASA/Plavix/statin, blood pressure control  - Inpatient rehab consulted

## 2017-02-18 PROBLEM — J96.01 ACUTE RESPIRATORY FAILURE WITH HYPOXIA: Status: ACTIVE | Noted: 2017-01-01

## 2017-02-18 NOTE — PLAN OF CARE
Problem: Patient Care Overview  Goal: Plan of Care Review  Outcome: Ongoing (interventions implemented as appropriate)  Patient free of falls or injury since arrival to the floor. Non-verbal indicators of pain are absent. Marcial maintained. SCD's maintained. Left soft wrist restraint maintained as ordered, no signs of discomfort or injury. Tube feeds as ordered. Weight shift assistance provided every two hours with pillows. Bed lowered and locked. Call light within reach. Side rails up x 2. Bed alarm on. Head of bed 30-45 degrees. Patient resting comfortably in bed. Will continue to monitor.

## 2017-02-18 NOTE — SIGNIFICANT EVENT
77 yr old pleasant white male with paroxysmal AFIB, CKD IV, aphasia, CVA, presents with stroke with respiratory distress. Nurse paged me regarding increased respirations and pt agitated. Ordered CXR which revealed worsening CHF with B/L mild P effusion. Lasix 10 mg IV once ordered without any much response. Came to floor to evaluate pt. He seems agitated with coarse B/L breath sounds. He is on NGT suction.     Recommend Respiratory assessment    BIPAP to help breathing      Ativan .5 mg IV once      Bjorn Zelaya MD

## 2017-02-18 NOTE — NURSING
Pt blood glucose 366.  10 units SSI administered as ordered.  Dr. Cullen notified.  No new orders noted.  Will continue to monitor.

## 2017-02-18 NOTE — PLAN OF CARE
Problem: Patient Care Overview  Goal: Plan of Care Review  Outcome: Ongoing (interventions implemented as appropriate)  Patient remains free from injury or falls. Marcial to gravity with adequate but minimal output. NGT in place; tube feedings given and BG monitored. Moonlighter informed of respiratory effort and orders carried out: chest xray, 1x lasix, ativan, respiratory to suction, and bipap applied. Bed in low locked position and call light within reach. Will continue to monitor closely.

## 2017-02-18 NOTE — PROGRESS NOTES
Dr. Zelaya notified of pt's respiratory rate in the 30s and nonproductive cough. Chest xray ordered. Will continue to monitor.

## 2017-02-18 NOTE — PT/OT/SLP PROGRESS
Speech Language Pathology  Treatment    Esteban Goins   MRN: [id number]   Admitting Diagnosis: Acute ischemic left MCA stroke    Diet recommendations: Solid Diet Level: NPO  Liquid Diet Level: NPO Oral feeding trials in speech therapy only. Continue alternate form of nutrition and hydration.     SLP Treatment Date: 02/18/17  Speech Start Time: 1230     Speech Stop Time: 1240     Speech Total (min): 10 min       TREATMENT BILLABLE MINUTES:  Speech Therapy Individual 10 min    Has the patient been evaluated by SLP for swallowing? : Yes  Keep patient NPO?: Yes   General Precautions: Standard, aspiration  Current Respiratory Status: nasal cannula       Subjective:  Pt in bed with Bipap       Objective:   Patient found with: BIPAP, NG tube, telemetry  COGNITIVE COMMUNICATION: Pt dcr mental status. Alerts to his name, opens eyes and demonstrates brief periods of wakefulness, 2-3 secs. Immediately falls back asleep. Opens eyes to voice and his name, however, does not localize sounds source or make eye contact with  Caregiver. Unable to follow simple 1 step commands. No yes/no modality elicited. No purposeful verbalizations. Primitive vocalizations only.    SWALLOWING: Ng tube in place. Oral feeding trials deferred due to need for BIPAP, increased WOB and RR, dcr safe level of alertness.  Assessment:  Esteban Goins is a 77 y.o. male with a medical diagnosis of Acute ischemic left MCA stroke and presents with severe to profound cognitive communication, language, swallowing and motor speech deficits    Discharge recommendations: Discharge Facility/Level Of Care Needs: rehabilitation facility     Goals:   SLP Goals        Problem: SLP Goal    Goal Priority Disciplines Outcome   SLP Goal     SLP           Problem: SLP Goal    Goal Priority Disciplines Outcome   SLP Goal     SLP    Description:  1.  The pt will follow 2-3 simple commands per session, given max visual/verbal cues, and structured environment to facilitate  response.  2. The pt will participate in any simple automatic speech task x1 per session, given max multimodal cues.  3. The pt will demonstrate yes/no response x3 per session via any modality, given max multimodal cues.               Plan:   Patient to be seen Therapy Frequency: 5 x/week   Plan of Care expires: 03/16/17  Plan of Care reviewed with: patient  SLP Follow-up?: Yes              Judith Light CCC-SLP  02/18/2017

## 2017-02-18 NOTE — PROGRESS NOTES
Unarouseable. Received Ativan early this am.    Vitals:    02/18/17 0046 02/18/17 0405 02/18/17 0745 02/18/17 0758   BP:  (!) 152/86 130/65    BP Location:  Left arm Left arm    Patient Position:  Lying Lying    BP Method:  Automatic Automatic    Pulse:  (!) 119 93 92   Resp: (!) 30 (!) 31 (!) 24 (!) 35   Temp:  98.1 °F (36.7 °C) 97.3 °F (36.3 °C)    TempSrc:  Oral Axillary    SpO2: 95% (!) 92% 97% (!) 91%   Weight:       Height:         Cor: Irregularly irregular    Back in AF.  Will place on telemetry.  Continue Amiodarone.

## 2017-02-19 NOTE — PROGRESS NOTES
[facility] Hospital Medicine  Progress Note    Patient Name: Esteban Goins  MRN: [id number]  Patient Class: IP- Inpatient   Admission Date: 2/13/2017  Length of Stay: 6 days  Attending Physician: Cristy Cullen MD  Primary Care Provider: [facility]        Subjective:     Principal Problem:Acute ischemic left MCA stroke    HPI:  Mr. Goins is a 77 year old man who was found by his brother with aphasia and right hemiparesis, was brought to [facility] and found to have severe hypertension and evidence of a stroke.  He was transferred to [facility] for a higher level of care.  On initial evaluation the patient was nonverbal and unable to follow commands.  Right arm showed flaccid paralysis and right leg withdrew to painful stimuli.  Initial labs showed likely stage IV CKD with proteinuria and uncontrolled diabetes.      Hospital Course:  Patient was admitted to the ICU and was seen by the neurologist.  MRI/MRA showed an occlusion of the left superior division of a segment of the MCA resulting in a large left cerebral infarction involving the left parietal, temporal and frontal cortices.  Permissive hypertension was recommended for 24-48 hours.  He was unable to speak or swallow when evaluated by Speech and an NGT was placed.  Overnight he developed atrial fibrillation with a rapid ventricular response and was started on an amiodarone drip.  He transitioned to oral amiodarone and bolus tube feeding.  He was evaluated by PT and OT and inpatient rehab was recommended.                Interval History:  Seen with PT and respiratory therapy.  He made brief eye contact today and tried to vocalize more, shaking his left arm and leg as he was being suctioned.  Not really following commands but moving more.    Review of Systems   Unable to perform ROS: Patient nonverbal     Objective:     Vital Signs (Most Recent):  Temp: 98.1 °F (36.7 °C) (02/19/17  0755)  Pulse: 91 (02/19/17 1415)  Resp: (!) 22 (02/19/17 1415)  BP: 127/66 (02/19/17 0755)  SpO2: (!) 93 % (02/19/17 1415) Vital Signs (24h Range):  Temp:  [97 °F (36.1 °C)-98.6 °F (37 °C)] 98.1 °F (36.7 °C)  Pulse:  [] 91  Resp:  [20-28] 22  SpO2:  [93 %-99 %] 93 %  BP: (118-128)/(61-77) 127/66     Weight: 76.9 kg (169 lb 8.5 oz)  Body mass index is 24.33 kg/(m^2).    Intake/Output Summary (Last 24 hours) at 02/19/17 1504  Last data filed at 02/19/17 0614   Gross per 24 hour   Intake             1400 ml   Output              875 ml   Net              525 ml      Physical Exam   Constitutional: He appears well-developed and well-nourished.   HENT:   Head: Normocephalic and atraumatic.   Eyes: Conjunctivae are normal. Pupils are equal, round, and reactive to light.   Squints left eye; right eye more open.  Tracks with both eyes.   Neck: Normal range of motion. Neck supple. No thyromegaly present.   Cardiovascular:   Irregularly irregular rhythm with HR in the 60's.   Pulmonary/Chest: He has no wheezes. He has no rales.   Breathing unlabored, unable to take a deep breath on command.  Coarse upper airway noise.   Abdominal: Soft. Bowel sounds are normal. He exhibits no distension. There is no tenderness.   Musculoskeletal:   Left hand deformed with only the thumb and 3rd digit remaining, heavily scarred.     Neurological:   Paralyzed on the right.  Expressive and receptive aphasia.  Does not follow commands.   Skin: Skin is warm and dry.       Significant Labs:   BMP:   Recent Labs  Lab 02/19/17  0515   *      K 4.1      CO2 22*   BUN 99*   CREATININE 4.3*   CALCIUM 8.3*       Significant Imaging: I have reviewed all pertinent imaging results/findings within the past 24 hours.    Assessment/Plan:      * Acute ischemic left MCA stroke  - Patient found down by brother, presented outside thrombolysis window.  - MRI/MRA with large left MCA stroke.  - 2D echo with diastolic dysfunction  - Continue  speech/PT/OT  - ASA/Plavix/statin, blood pressure control  - Inpatient rehab consulted      Hemiparesis, right  - Due to left MCA stroke.  - Continue therapy  - IP Rehab recommended.      Oral phase dysphagia  - Speech following and recommend NPO status with tube feeding.  - Secretions evident due to inability to handle them   - GI to place PEG Monday - hopefully this will not be permanent and he can regain some function in rehab.    Aphasia  - Expressive and receptive.  - Patient is unable to speak or follow commands but makes eye contact.      HTN (hypertension), malignant  - Metoprolol increased to 50 mg tid  - Lisinopril discontinued given worsening renal function and hydralazine and nifedipine added.  - Gradually improving.  - Will need Ace or ARB eventually.    Iron deficiency anemia  - Appears to have iron deficiency in addition to anemia of chronic kidney disease.  - Iron studies show low iron stores with normal TIBC      CKD (chronic kidney disease), stage III  - Chronic kidney disease stage III-IV with uncertain baseline.  - Patient on allopurinol for hyperuricemia  - Proteinuria with diabetes as contributor.  - Lisinopril held for increasing creatinine but will eventually need ACE or ARB for proteinuria.  - Increased free water intake without much improvement  - Received 2 doses IV Lasix in last 48 hours.  - Consult nephrology for SONYA        Type 2 diabetes mellitus with diabetic chronic kidney disease  - Patient on glipizide at home - SSI while here and Levemir twice daily until bolus feeding is started.  - Increase long acting as needed  - HbA1c 9.7 and sister reports he was noncompliant with diabetes treatment.      Paroxysmal atrial fibrillation  - Patient in NSR on arrival but now in rate controlled atrial fibrillation.  - Cardiologist following and transitioning patient to oral amiodarone.  - Continue beta blocker.    Acute respiratory failure with hypoxia  - Patient has had some difficulty handling  his secretions, but pulmonary edema not evident.  - No evidence systolic heart failure although has some diastolic dysfunction.      VTE Risk Mitigation         Ordered     heparin (porcine) injection 5,000 Units  Every 8 hours     Route:  Subcutaneous        02/13/17 1421     Medium Risk of VTE  Once      02/13/17 1421     Place sequential compression device  Until discontinued      02/13/17 1421          Cristy Maher MD  Department of Hospital Medicine   [facility], [place], operated by [facility]

## 2017-02-19 NOTE — PLAN OF CARE
Problem: Physical Therapy Goal  Goal: Physical Therapy Goal  Goals to be met by: 3/14/17     Patient will increase functional independence with mobility by performin. Supine to sit with min A.  2. Sit to supine with min A.   3. Rolling R and L with min A.   4. Sitting at edge of bed >5 minutes with min A.   5. PT will assess sit<>stand.    Outcome: Ongoing (interventions implemented as appropriate)  Pt progressing slowly towards goals. He continues to require total assist for bed mobility and sitting balance with use of L UE support for stability in sitting. Will continue to follow and progress as tolerated. Please see progress note for detailed plan of care and recommendations.

## 2017-02-19 NOTE — PLAN OF CARE
Problem: NPPV/CPAP (Adult)  Goal: Signs and Symptoms of Listed Potential Problems Will be Absent, Minimized or Managed (NPPV/CPAP)  Signs and symptoms of listed potential problems will be absent, minimized or managed by discharge/transition of care (reference NPPV/CPAP (Adult) CPG).   Outcome: Ongoing (interventions implemented as appropriate)  Required intermittent deep nasal suctioning. Mask requires aggressive adjustment to overcome NG tube and beard stubble.

## 2017-02-19 NOTE — ASSESSMENT & PLAN NOTE
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  - Patient on allopurinol for hyperuricemia  - Proteinuria with diabetes as contributor.  - Lisinopril held for increasing creatinine but will eventually need ACE or ARB for proteinuria.  - Increased free water intake without much improvement  - Received 2 doses IV Lasix in last 48 hours.  - Consult nephrology for SONYA

## 2017-02-19 NOTE — PROGRESS NOTES
Awake, non communicative. Has Bipap on.    Vitals:    02/19/17 0740 02/19/17 0755 02/19/17 0800 02/19/17 1000   BP:  127/66     BP Location:  Right arm     Patient Position:  Lying     BP Method:  Automatic     Pulse: 80 91 97 82   Resp: (!) 25 20     Temp:  98.1 °F (36.7 °C)     TempSrc:  Axillary     SpO2: 99% 99%     Weight:       Height:           Chest clear  Cor: irregular. No gallop.    In AF, being loaded with po Amiodarone.

## 2017-02-19 NOTE — PT/OT/SLP PROGRESS
Physical Therapy  Treatment    Esteban Goins   MRN: [id number]   Admitting Diagnosis: Acute ischemic left MCA stroke    PT Received On: 02/19/17  PT Start Time: 1229     PT Stop Time: 1250    PT Total Time (min): 21 min       Billable Minutes:  Therapeutic Activity 11 and Neuromuscular Re-education 10    Treatment Type: Treatment  PT/PTA: PT     PTA Visit Number: 0       General Precautions: Standard,  (fall and aspiration risk)  Orthopedic Precautions: N/A   Braces: N/A    Do you have any cultural, spiritual, Religion conflicts, given your current situation?: unable to assess; pt nonverbal    Subjective:  Communicated with nurse prior to session.  Pt essentially non-verbal, although some groaning and gesturing during session. Improved eye contact noted.   Pain Rating Post-Intervention:  (Unable to locate, quantify or verbalize)    Objective:   Patient found with: BIPAP supine in bed with HOB elevated, wedge pillow at R side    Functional Mobility:  Bed Mobility:   Scooting/Bridging: Total Assistance, With assist of 2 (with drawsheet)  Supine to Sit: Total Assistance  Sit to Supine: Total Assistance    Transfers:  Sit <> Stand Assistance:  (Unable to perform)    Gait:   Gait Distance: Unable to perform      Balance:   Static Sit: Total A at edge of bed, assist at trunk, pt using L UE for support  Dynamic Sit: Total A for anterior/posterior and lateral weight shifting in sitting.      PT assisted patient in D1/D2 PNF R UE and R LE x 12 reps. Noted decreased tone compared to previous session.        AM-PAC 6 CLICK MOBILITY  How much help from another person does this patient currently need?   1 = Unable, Total/Dependent Assistance  2 = A lot, Maximum/Moderate Assistance  3 = A little, Minimum/Contact Guard/Supervision  4 = None, Modified [place]/Independent    Turning over in bed (including adjusting bedclothes, sheets and blankets)?: 1  Sitting down on and standing up from a chair with arms (e.g., wheelchair,  bedside commode, etc.): 1  Moving from lying on back to sitting on the side of the bed?: 1  Moving to and from a bed to a chair (including a wheelchair)?: 1  Need to walk in hospital room?: 1  Climbing 3-5 steps with a railing?: 1  Total Score: 6    AM-PAC Raw Score CMS G-Code Modifier Level of Impairment Assistance   6 % Total / Unable   7 - 9 CM 80 - 100% Maximal Assist   10 - 14 CL 60 - 80% Moderate Assist   15 - 19 CK 40 - 60% Moderate Assist   20 - 22 CJ 20 - 40% Minimal Assist   23 CI 1-20% SBA / CGA   24 CH 0% Independent/ Mod I     Patient left right sidelying (slight) with R UE and R LE on pillow with all lines intact, call button in reach, bed alarm on, nurse notified and nurse present.    Assessment:  Esteban Goins is a 77 y.o. male with a medical diagnosis of Acute ischemic left MCA stroke Pt progressing slowly towards goals. He continues to require total assist for bed mobility and sitting balance with use of L UE support for stability in sitting. Pt would benefit from continued skilled PT to maximize independence and safety with functional mobility.    Rehab identified problem list/impairments: Rehab identified problem list/impairments: weakness, impaired self care skills, impaired functional mobilty, impaired balance, decreased lower extremity function, decreased upper extremity function, decreased ROM, impaired coordination, impaired cognition    Rehab potential is fair.    Activity tolerance: Fair    Discharge recommendations: Discharge Facility/Level Of Care Needs: rehabilitation facility     Barriers to discharge: Barriers to Discharge: Inaccessible home environment, Decreased caregiver support    Equipment recommendations:       GOALS:   Physical Therapy Goals        Problem: Physical Therapy Goal    Goal Priority Disciplines Outcome Goal Variances Interventions   Physical Therapy Goal     PT/OT, PT Ongoing (interventions implemented as appropriate)     Description:  Goals to be met by:  3/14/17     Patient will increase functional independence with mobility by performin. Supine to sit with min A.  2. Sit to supine with min A.   3. Rolling R and L with min A.   4. Sitting at edge of bed >5 minutes with min A.   5. PT will assess sit<>stand.                 PLAN:    Patient to be seen daily  to address the above listed problems via gait training, therapeutic activities, therapeutic exercises, neuromuscular re-education  Plan of Care expires: 17  Plan of Care reviewed with: patient         Nely Velasquez, PT  2017

## 2017-02-19 NOTE — SUBJECTIVE & OBJECTIVE
Interval History:  Seen with PT and respiratory therapy.  He made brief eye contact today and tried to vocalize more, shaking his left arm and leg as he was being suctioned.  Not really following commands but moving more.    Review of Systems   Unable to perform ROS: Patient nonverbal     Objective:     Vital Signs (Most Recent):  Temp: 98.1 °F (36.7 °C) (02/19/17 0755)  Pulse: 91 (02/19/17 1415)  Resp: (!) 22 (02/19/17 1415)  BP: 127/66 (02/19/17 0755)  SpO2: (!) 93 % (02/19/17 1415) Vital Signs (24h Range):  Temp:  [97 °F (36.1 °C)-98.6 °F (37 °C)] 98.1 °F (36.7 °C)  Pulse:  [] 91  Resp:  [20-28] 22  SpO2:  [93 %-99 %] 93 %  BP: (118-128)/(61-77) 127/66     Weight: 76.9 kg (169 lb 8.5 oz)  Body mass index is 24.33 kg/(m^2).    Intake/Output Summary (Last 24 hours) at 02/19/17 1504  Last data filed at 02/19/17 0614   Gross per 24 hour   Intake             1400 ml   Output              875 ml   Net              525 ml      Physical Exam   Constitutional: He appears well-developed and well-nourished.   HENT:   Head: Normocephalic and atraumatic.   Eyes: Conjunctivae are normal. Pupils are equal, round, and reactive to light.   Squints left eye; right eye more open.  Tracks with both eyes.   Neck: Normal range of motion. Neck supple. No thyromegaly present.   Cardiovascular:   Irregularly irregular rhythm with HR in the 60's.   Pulmonary/Chest: He has no wheezes. He has no rales.   Breathing unlabored, unable to take a deep breath on command.  Coarse upper airway noise.   Abdominal: Soft. Bowel sounds are normal. He exhibits no distension. There is no tenderness.   Musculoskeletal:   Left hand deformed with only the thumb and 3rd digit remaining, heavily scarred.     Neurological:   Paralyzed on the right.  Expressive and receptive aphasia.  Does not follow commands.   Skin: Skin is warm and dry.       Significant Labs:   BMP:   Recent Labs  Lab 02/19/17  0515   *      K 4.1      CO2 22*   BUN  99*   CREATININE 4.3*   CALCIUM 8.3*       Significant Imaging: I have reviewed all pertinent imaging results/findings within the past 24 hours.

## 2017-02-19 NOTE — ASSESSMENT & PLAN NOTE
- Speech following and recommend NPO status with tube feeding.  - Secretions evident due to inability to handle them   - GI to place PEG Monday - hopefully this will not be permanent and he can regain some function in rehab.

## 2017-02-19 NOTE — PLAN OF CARE
Problem: Patient Care Overview  Goal: Plan of Care Review  Outcome: Ongoing (interventions implemented as appropriate)  Patient received on bipap with adequate satruration. Duoneb treatment given Q6, tolerated well inline with bipap. Discussed care plan with Dr. Cullen. Bipap placed on stand by, patient on 4LNC with humidification added. Patient suctioned due to increased upper airway coarseness. Oral care kits with suction placed at bedside to assist nursing staff with oral care.

## 2017-02-19 NOTE — ASSESSMENT & PLAN NOTE
- Patient requiring BiPAP today  - This is likely because he cannot handle his secretions  - No evidence systolic heart failure although has some diastolic dysfunction.  - IV Lasix added (likely did not get a big enough dose last night).

## 2017-02-19 NOTE — PLAN OF CARE
Problem: Patient Care Overview  Goal: Plan of Care Review  Outcome: Ongoing (interventions implemented as appropriate)  No visitors this shift to review plan of care with.

## 2017-02-19 NOTE — ASSESSMENT & PLAN NOTE
- Metoprolol increased to 50 mg tid  - Lisinopril discontinued given worsening renal function and hydralazine and nifedipine added.  - Gradually improving.  - Will need Ace or ARB eventually.

## 2017-02-19 NOTE — H&P
REASON FOR CONSULTATION: SONYA on CKD     HPI:77 y.o. male transferred to this facility from [facility] in [place] on 2/14/17 after acute CVA L thalamus and hypertensive emergency with SBP's over 200.  He was aphasic, dysphagic,  and did not follow commands on transfer.  Does not follow commands.  Right arm flaccid paralysis.  No TPA as was outside thrombolysis window.  Underwent MRA on Feb 13 th.  No other clear nephrotoxins since then.  Has had no improvement since admission and the plan is now for placement of a PEG tube for feeding.  This cannot be done until he's been off Clopid for 5-7 days per GI.    REVIEW OF SYSTEMS:  Cannot express himself.    Past Medical History   Diagnosis Date    Coronary artery disease     Diabetes mellitus     Hypertension     MI (myocardial infarction)        History reviewed. No pertinent past surgical history.    Review of patient's allergies indicates:  No Known Allergies    Prescriptions Prior to Admission   Medication Sig Dispense Refill Last Dose    allopurinol (ZYLOPRIM) 100 MG tablet Take 200 mg by mouth once daily.       clopidogrel (PLAVIX) 75 mg tablet Take 75 mg by mouth once daily.       ergocalciferol (VITAMIN D2) 50,000 unit Cap Take 50,000 Units by mouth every 7 days.       gabapentin (NEURONTIN) 300 MG capsule Take 300 mg by mouth 3 (three) times daily.       glipiZIDE (GLUCOTROL) 10 MG tablet Take 10 mg by mouth 2 (two) times daily with meals.       lisinopril (PRINIVIL,ZESTRIL) 40 MG tablet Take 40 mg by mouth once daily.       simvastatin (ZOCOR) 40 MG tablet Take 40 mg by mouth every evening.          Current Facility-Administered Medications   Medication Dose Route Frequency Provider Last Rate Last Dose    acetaminophen suppository 650 mg  650 mg Rectal Q6H PRN Christos Hernández MD        albuterol-ipratropium 2.5mg-0.5mg/3mL nebulizer solution 3 mL  3 mL Nebulization Q6H Bjorn Zelaya MD   3 mL at 02/19/17 0740    allopurinol tablet 200 mg   200 mg Oral Daily Cristy Cullen MD   200 mg at 02/19/17 1302    amiodarone 360 mg/200 mL (1.8 mg/mL) infusion  0.5 mg/min Intravenous Continuous Nicanor Freitas MD   Stopped at 02/16/17 1400    amiodarone tablet 200 mg  200 mg Oral QID (WM & HS) Nicanor Freitas MD   200 mg at 02/19/17 1303    aspirin EC tablet 81 mg  81 mg Oral Daily Cristy Cullen MD   81 mg at 02/19/17 1304    atorvastatin tablet 40 mg  40 mg Oral Daily Christos Hernández MD   40 mg at 02/19/17 1304    dextrose 50% injection 12.5 g  12.5 g Intravenous PRN Christos Hernández MD        famotidine (PF) 20 mg/2 mL injection 20 mg  20 mg Intravenous BID Christos Hernández MD   20 mg at 02/19/17 1304    furosemide injection 40 mg  40 mg Intravenous Daily Cristy Cullen MD   40 mg at 02/19/17 1304    glucagon (human recombinant) injection 1 mg  1 mg Intramuscular PRN Christos Hernández MD        heparin (porcine) injection 5,000 Units  5,000 Units Subcutaneous Q8H Christos Hernández MD   5,000 Units at 02/19/17 0559    hydrALAZINE tablet 25 mg  25 mg Oral Q8H Cristy Cullen MD   25 mg at 02/19/17 0559    insulin aspart pen 1-10 Units  1-10 Units Subcutaneous Q6H PRN Christos Hernández MD   2 Units at 02/19/17 1305    insulin detemir pen 20 Units  20 Units Subcutaneous BID Cristy Cullen MD   20 Units at 02/19/17 1305    labetalol injection 10 mg  10 mg Intravenous Q4H PRN JOHNATHON Chacon MD   10 mg at 02/17/17 0844    metoprolol tartrate (LOPRESSOR) tablet 50 mg  50 mg Oral TID Cristy Cullen MD   50 mg at 02/19/17 0559    nifedipine 30 MG ORAL TR24 24 hr tablet 60 mg  60 mg Oral Daily Cristy Cullen MD   60 mg at 02/19/17 1304    sodium chloride 0.9% bolus 500 mL  500 mL Intravenous Continuous PRN Christos Hernández MD           Social History     Social History    Marital status: Unknown     Spouse name: N/A    Number of children: N/A    Years of education: N/A     Social History Main Topics    Smoking  status: Unknown If Ever Smoked    Smokeless tobacco: None    Alcohol use None    Drug use: None    Sexual activity: Not Asked     Other Topics Concern    None     Social History Narrative    None       History reviewed. No pertinent family history.    Temp:  [97 °F (36.1 °C)-98.6 °F (37 °C)] 98.1 °F (36.7 °C)  Pulse:  [] 104  Resp:  [20-28] 20  SpO2:  [93 %-99 %] 99 %  BP: (118-128)/(61-77) 127/66      PHYSICAL EXAM:    Well developed, well nourished, non-verbal but awake and appears very angry  NCAT Bipap in place  Neck supple no LAD  Irr Irr HR in 60's No rubs +S4  Course BS upper AW no crackles apprec.  Abd soft NTND +BS, obese  LUE severely deformed with thumb and 3rd digit remaining.  Healed scars present  RUE flaccid paralysis  No LE edema, CC.    Labs:  Reveiwed    EKG RESULTS: Reviewed    Rad:  MRI/MRA showed an occlusion of the left superior division of a segment of the MCA resulting in a large left cerebral infarction involving the left parietal, temporal and frontal cortices.     CXR:  Enteric tube tip below left hemidiaphragm in appropriate position.    Cardiomegaly with interval increased attenuation of the pulmonary parenchyma and associated stable small bilateral pleural effusions.  The findings are suggestive of probable worsening CHF.  Clinical correlation and followup are suggested.    Nonspecific lucency in the right cardiophrenic angle.      ASSESSMENT AND PLAN:    76 YO male s/o acute ischemic left MCA stroke and hypertensive emergency.  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    SONYA on CKD3/4- baseline unclear, but creatinine trending up since admission.  He did undergo a dye load with the MRA on admission which might have induced a ADDI.  Will order routine renal workup and hold any further lasix for now.  I think he has upper respiratory exam reflects upper AW congestion due to his inability to swallow.  No nephrotoxins.  Suspect underlying CKD due to combo of hypertension and DM based on  history.  Discussed plan with Dr. Cullen.  Will increase free water per NGT to 1500 cc/day, monitor Ins and Outs closely.    HTN:  As now.  No ACE, ARB, or diuretic.    Afib with RVR, new onset:  Per Cards.  On amiodarone.    Protein-Calorie malnutrition:  PEG planned due to dysphagia.  NGT feeds for now with Diabeta Source and free water as noted above.    DM:  Per primary    Thanks for consult.  Will follow along with you.    Hyperuricemia:  On allopurinol but noncompliant of late per family.  Check uric acid level to see if urate neph could be playing a role here.

## 2017-02-19 NOTE — SUBJECTIVE & OBJECTIVE
Interval History:  Overnight he developed agitation and respiratory distress and was given a small dose of Lasix and Ativan.  Today he is on BiPAP and has very coarse breath sounds, sounds wet and is unresponsive.    Review of Systems   Unable to perform ROS: Patient nonverbal     Objective:     Vital Signs (Most Recent):  Temp: 98.1 °F (36.7 °C) (02/18/17 1519)  Pulse: 95 (02/18/17 1800)  Resp: (!) 24 (02/18/17 1519)  BP: 128/61 (02/18/17 1519)  SpO2: (!) 93 % (02/18/17 1519) Vital Signs (24h Range):  Temp:  [97.3 °F (36.3 °C)-99.4 °F (37.4 °C)] 98.1 °F (36.7 °C)  Pulse:  [] 95  Resp:  [22-35] 24  SpO2:  [90 %-97 %] 93 %  BP: (123-152)/(61-86) 128/61     Weight: 76.9 kg (169 lb 8.5 oz)  Body mass index is 24.33 kg/(m^2).    Intake/Output Summary (Last 24 hours) at 02/18/17 2011  Last data filed at 02/18/17 1808   Gross per 24 hour   Intake             1805 ml   Output              825 ml   Net              980 ml      Physical Exam   Constitutional: He appears well-developed and well-nourished.   HENT:   Head: Normocephalic and atraumatic.   BiPAP mask on.   Eyes:   Eyes closed today, no response to voice or touch.   Neck: Normal range of motion. Neck supple. No thyromegaly present.   Cardiovascular:   Irregularly irregular rhythm with HR in the 60's.   Pulmonary/Chest: He has no wheezes. He has no rales.   Breathing unlabored, unable to take a deep breath on command.  Coarse upper airway noise.   Abdominal: Soft. Bowel sounds are normal. He exhibits no distension. There is no tenderness.   Musculoskeletal:   Left hand deformed with only the thumb and 3rd digit remaining, heavily scarred.     Neurological:   Paralyzed on the right.     Skin: Skin is warm and dry.       Significant Labs:   BMP:   Recent Labs  Lab 02/18/17  0454   *      K 4.3   *   CO2 21*   BUN 71*   CREATININE 3.6*   CALCIUM 8.4*       Significant Imaging:   CXR  Findings:      Enteric tube courses below the left  hemidiaphragm with its tip in the right upper abdominal quadrant in the region of the gastric antrum.  Previous monitoring EKG leads have been removed.      The trachea is unremarkable.There is stable enlargement of the cardiomediastinal silhouette.  There is no evidence of free air beneath the hemidiaphragms.  There is stable obscuration of both costophrenic angles.  There is no evidence of a pneumothorax.  There is nonspecific lucency in the right cardiophrenic angle.   There is increased attenuation of the pulmonary parenchyma.  There are degenerative changes in the osseous structures.       Impression       Enteric tube tip below left hemidiaphragm in appropriate position.    Cardiomegaly with interval increased attenuation of the pulmonary parenchyma and associated stable small bilateral pleural effusions.  The findings are suggestive of probable worsening CHF.  Clinical correlation and followup are suggested.    Nonspecific lucency in the right cardiophrenic angle.      Electronically signed by: GAYATHRI MEDINA MD  Date: 02/18/17

## 2017-02-19 NOTE — PT/OT/SLP PROGRESS
Occupational Therapy  Treatment    Esteban Goins   MRN: [id number]   Admitting Diagnosis: Acute ischemic left MCA stroke    OT Date of Treatment: 02/19/17   OT Start Time: 1415  OT Stop Time: 1449  OT Total Time (min): 34 min    Billable Minutes:  Therapeutic Activity 24 and Neuromuscular Re-education 10    General Precautions: Standard, aspiration, NPO, fall  Orthopedic Precautions: N/A  Braces: N/A    Do you have any cultural, spiritual, Anabaptism conflicts, given your current situation?: none stated -pt nonverbal    Subjective:  Communicated with nursing prior to session.  Pt supine in bed upon OT arrival.     Pain Rating:  (Pt cannot verbalize pain however, was easily aggitated with posterior lean and grunting near end of session. )                   Objective:  Patient found with: BIPAP     Functional Mobility:  Bed Mobility:  Rolling/Turning to Left: Total assistance  Rolling/Turning Right: Total assistance  Scooting/Bridging: Total Assistance  Supine to Sit: Total Assistance  Sit to Supine: Total Assistance    Transfers:        Functional Ambulation: N/A    Activities of Daily Living:    Grooming Position: other (bed level)  Grooming Level of Assistance: Total assistance (wipeing face. Pt presented with decreased opening L eye and appearance  of drainage present)            Balance:   Static Sit: 0: Needs MAXIMAL assist to maintain sitting without back support  Dynamic Sit: 0: N/A      Therapeutic Activities and Exercises:  Bed mobility, sit <> stand, brief self care and attention to task. Sitting balance and tolerance. Sitting EOB with support at trunk and input at scapula and following down RLE.     AM-PAC 6 CLICK ADL   How much help from another person does this patient currently need?   1 = Unable, Total/Dependent Assistance  2 = A lot, Maximum/Moderate Assistance  3 = A little, Minimum/Contact Guard/Supervision  4 = None, Modified [place]/Independent    Putting on and taking off regular lower body  clothing? : 1  Bathing (including washing, rinsing, drying)?: 1  Toileting, which includes using toilet, bedpan, or urinal? : 1  Putting on and taking off regular upper body clothing?: 1  Taking care of personal grooming such as brushing teeth?: 1  Eating meals?: 1  Total Score: 6     AM-PAC Raw Score CMS G-Code Modifier Level of Impairment Assistance   6 % Total / Unable   7 - 9 CM 80 - 100% Maximal Assist   10 - 14 CL 60 - 80% Moderate Assist   15 - 19 CK 40 - 60% Moderate Assist   20 - 22 CJ 20 - 40% Minimal Assist   23 CI 1-20% SBA / CGA   24 CH 0% Independent/ Mod I     Patient left HOB elevated with all lines intact, call button in reach, bed alarm on and respitory and nephrology  present    ASSESSMENT:  Esteban Goins is a 77 y.o. male with a medical diagnosis of Acute ischemic left MCA stroke Pt presented with improved sitting balance and tolerance however still requiring max A for sitting balance and tolerance. Pt continues to present with decreased initiation, following of commands, attention to R field, sitting balance and tolerance. He tolerated sitting EOB with support at trunk and input at scapula and following down RLE. Pt became agitated and presented with a posterior lean after sitting EOB. Pt redirected and returned back to bed with restraint on. Pt would benefit from skilled occupational therapy intervention for increased activity tolerance and independence in ADL's.     Rehab identified problem list/impairments: Rehab identified problem list/impairments: weakness, impaired endurance, impaired balance, decreased upper extremity function, decreased lower extremity function, decreased safety awareness, impaired cognition, impaired self care skills, decreased ROM, impaired coordination, impaired fine motor, impaired functional mobilty    Rehab potential is fair.    Activity tolerance: Fair    Discharge recommendations: Discharge Facility/Level Of Care Needs: rehabilitation facility     Barriers  to discharge: Barriers to Discharge: Inaccessible home environment, Decreased caregiver support    Equipment recommendations:  (TBD)     GOALS:   Occupational Therapy Goals        Problem: Occupational Therapy Goal    Goal Priority Disciplines Outcome Interventions   Occupational Therapy Goal     OT, PT/OT Ongoing (interventions implemented as appropriate)    Description:  Goals to be met by: 2/24/2017    Patient will increase functional independence with ADLs by performing:    Grooming while bedlevel with Moderate Assistance.  Sitting at edge of bed x20  minutes with Stand-by Assistance.  Supine to sit with Maximum Assistance.  Stand pivot transfers assessment without distress.  Increased functional strength to 2/5 for RUE.  RUE Upper extremity exercise program  10 reps per handout, with assistance as needed.                Plan:  Patient to be seen 6 x/week to address the above listed problems via self-care/home management, therapeutic activities, neuromuscular re-education, cognitive retraining  Plan of Care expires: 03/14/17  Plan of Care reviewed with: patient         Efraínkerri RUIZ Ar[place], OT  02/19/2017

## 2017-02-19 NOTE — PROGRESS NOTES
[facility] Hospital Medicine  Progress Note    Patient Name: Esteban Goins  MRN: [id number]  Patient Class: IP- Inpatient   Admission Date: 2/13/2017  Length of Stay: 5 days  Attending Physician: Cristy Cullen MD  Primary Care Provider: [facility]        Subjective:     Principal Problem:Acute ischemic left MCA stroke    HPI:  Mr. Goins is a 77 year old man who was found by his brother with aphasia and right hemiparesis, was brought to [facility] and found to have severe hypertension and evidence of a stroke.  He was transferred to [facility] for a higher level of care.  On initial evaluation the patient was nonverbal and unable to follow commands.  Right arm showed flaccid paralysis and right leg withdrew to painful stimuli.  Initial labs showed likely stage IV CKD with proteinuria and uncontrolled diabetes.      Hospital Course:  Patient was admitted to the ICU and was seen by the neurologist.  MRI/MRA showed an occlusion of the left superior division of a segment of the MCA resulting in a large left cerebral infarction involving the left parietal, temporal and frontal cortices.  Permissive hypertension was recommended for 24-48 hours.  He was unable to speak or swallow when evaluated by Speech and an NGT was placed.  Overnight he developed atrial fibrillation with a rapid ventricular response and was started on an amiodarone drip.  He transitioned to oral amiodarone and bolus tube feeding.                  Interval History:  Overnight he developed agitation and respiratory distress and was given a small dose of Lasix and Ativan.  Today he is on BiPAP and has very coarse breath sounds, sounds wet and is unresponsive.    Review of Systems   Unable to perform ROS: Patient nonverbal     Objective:     Vital Signs (Most Recent):  Temp: 98.1 °F (36.7 °C) (02/18/17 1519)  Pulse: 95 (02/18/17 1800)  Resp: (!) 24 (02/18/17 1519)  BP: 128/61  (02/18/17 1519)  SpO2: (!) 93 % (02/18/17 1519) Vital Signs (24h Range):  Temp:  [97.3 °F (36.3 °C)-99.4 °F (37.4 °C)] 98.1 °F (36.7 °C)  Pulse:  [] 95  Resp:  [22-35] 24  SpO2:  [90 %-97 %] 93 %  BP: (123-152)/(61-86) 128/61     Weight: 76.9 kg (169 lb 8.5 oz)  Body mass index is 24.33 kg/(m^2).    Intake/Output Summary (Last 24 hours) at 02/18/17 2011  Last data filed at 02/18/17 1808   Gross per 24 hour   Intake             1805 ml   Output              825 ml   Net              980 ml      Physical Exam   Constitutional: He appears well-developed and well-nourished.   HENT:   Head: Normocephalic and atraumatic.   BiPAP mask on.   Eyes:   Eyes closed today, no response to voice or touch.   Neck: Normal range of motion. Neck supple. No thyromegaly present.   Cardiovascular:   Irregularly irregular rhythm with HR in the 60's.   Pulmonary/Chest: He has no wheezes. He has no rales.   Breathing unlabored, unable to take a deep breath on command.  Coarse upper airway noise.   Abdominal: Soft. Bowel sounds are normal. He exhibits no distension. There is no tenderness.   Musculoskeletal:   Left hand deformed with only the thumb and 3rd digit remaining, heavily scarred.     Neurological:   Paralyzed on the right.     Skin: Skin is warm and dry.       Significant Labs:   BMP:   Recent Labs  Lab 02/18/17  0454   *      K 4.3   *   CO2 21*   BUN 71*   CREATININE 3.6*   CALCIUM 8.4*       Significant Imaging:   CXR  Findings:      Enteric tube courses below the left hemidiaphragm with its tip in the right upper abdominal quadrant in the region of the gastric antrum.  Previous monitoring EKG leads have been removed.      The trachea is unremarkable.There is stable enlargement of the cardiomediastinal silhouette.  There is no evidence of free air beneath the hemidiaphragms.  There is stable obscuration of both costophrenic angles.  There is no evidence of a pneumothorax.  There is nonspecific lucency in  the right cardiophrenic angle.   There is increased attenuation of the pulmonary parenchyma.  There are degenerative changes in the osseous structures.       Impression       Enteric tube tip below left hemidiaphragm in appropriate position.    Cardiomegaly with interval increased attenuation of the pulmonary parenchyma and associated stable small bilateral pleural effusions.  The findings are suggestive of probable worsening CHF.  Clinical correlation and followup are suggested.    Nonspecific lucency in the right cardiophrenic angle.      Electronically signed by: GAYATHRI MEDINA MD  Date: 02/18/17           Assessment/Plan:      * Acute ischemic left MCA stroke  - Patient found down by brother, presented outside thrombolysis window.  - MRI/MRA with large left MCA stroke.  - 2D echo with diastolic dysfunction  - Continue speech/PT/OT  - ASA/Plavix/statin, blood pressure control  - Inpatient rehab consulted      Hemiparesis, right  - Due to left MCA stroke.  - Continue therapy  - IP Rehab recommended.      Oral phase dysphagia  - Speech following and recommend NPO status with tube feeding.  - Secretions evident due to inability to handle them   - GI to place PEG Monday - hopefully this will not be permanent and he can regain some function in rehab.    Aphasia  - Expressive and receptive.  - Patient is unable to speak or follow commands but makes eye contact.      HTN (hypertension), malignant  - Metoprolol increased to 50 mg tid  - Lisinopril discontinued given worsening renal function and hydralazine and nifedipine added.  - Gradually improving.  - Will need lisinopril eventually.    Iron deficiency anemia  - Appears to have iron deficiency in addition to anemia of chronic kidney disease.  - Iron studies show low iron stores with normal TIBC      CKD (chronic kidney disease), stage III  - Chronic kidney disease stage III-IV with uncertain baseline.  - Patient on allopurinol for hyperuricemia  - Proteinuria with  diabetes as contributor.  - Lisinopril held for increasing creatinine but will eventually need ACE or ARB for proteinuria.  - Increased free water intake yesterday without much improvement  - Consult nephrology        Type 2 diabetes mellitus with diabetic chronic kidney disease  - Patient on glipizide at home - SSI while here and Levemir twice daily until bolus feeding is started.  - Increase long acting as needed  - HbA1c 9.7 and sister reports he was noncompliant with diabetes treatment.      Paroxysmal atrial fibrillation  - Patient in NSR on arrival but now in rate controlled atrial fibrillation.  - Cardiologist following and transitioning patient to oral amiodarone.  - Continue beta blocker.    Acute respiratory failure with hypoxia  - Patient requiring BiPAP today  - This is likely because he cannot handle his secretions  - No evidence systolic heart failure although has some diastolic dysfunction.  - IV Lasix added (likely did not get a big enough dose last night).      VTE Risk Mitigation         Ordered     heparin (porcine) injection 5,000 Units  Every 8 hours     Route:  Subcutaneous        02/13/17 1421     Medium Risk of VTE  Once      02/13/17 1421     Place sequential compression device  Until discontinued      02/13/17 1421          Cristy Maher MD  Department of Hospital Medicine   [facility]

## 2017-02-19 NOTE — CONSULTS
REASON FOR CONSULTATION: SONYA on CKD      HPI:77 y.o. male transferred to this facility from [facility] in [place] on 2/14/17 after acute CVA L thalamus and hypertensive emergency with SBP's over 200. He was aphasic, dysphagic, and did not follow commands on transfer. Does not follow commands. Right arm flaccid paralysis. No TPA as was outside thrombolysis window. Underwent MRA on Feb 13 th. No other clear nephrotoxins since then. Has had no improvement since admission and the plan is now for placement of a PEG tube for feeding. This cannot be done until he's been off Clopid for 5-7 days per GI.  Has history of CKD but no baseline or history of this diagnosis given or available.       REVIEW OF SYSTEMS: Cannot express himself.          Past Medical History   Diagnosis Date    Coronary artery disease      Diabetes mellitus      Hypertension      MI (myocardial infarction)           History reviewed. No pertinent past surgical history.     Review of patient's allergies indicates:  No Known Allergies      Prescriptions Prior to Admission            Prescriptions Prior to Admission   Medication Sig Dispense Refill Last Dose    allopurinol (ZYLOPRIM) 100 MG tablet Take 200 mg by mouth once daily.          clopidogrel (PLAVIX) 75 mg tablet Take 75 mg by mouth once daily.          ergocalciferol (VITAMIN D2) 50,000 unit Cap Take 50,000 Units by mouth every 7 days.          gabapentin (NEURONTIN) 300 MG capsule Take 300 mg by mouth 3 (three) times daily.          glipiZIDE (GLUCOTROL) 10 MG tablet Take 10 mg by mouth 2 (two) times daily with meals.          lisinopril (PRINIVIL,ZESTRIL) 40 MG tablet Take 40 mg by mouth once daily.          simvastatin (ZOCOR) 40 MG tablet Take 40 mg by mouth every evening.                   Current Medications              Current Facility-Administered Medications   Medication Dose Route Frequency Provider Last Rate Last Dose    acetaminophen suppository 650 mg 650 mg  Rectal Q6H PRN Christos Hernández MD          albuterol-ipratropium 2.5mg-0.5mg/3mL nebulizer solution 3 mL 3 mL Nebulization Q6H Bjorn Zelaya MD    3 mL at 02/19/17 0740    allopurinol tablet 200 mg 200 mg Oral Daily Cristy Cullen MD    200 mg at 02/19/17 1302    amiodarone 360 mg/200 mL (1.8 mg/mL) infusion 0.5 mg/min Intravenous Continuous Nicanor Freitas MD    Stopped at 02/16/17 1400    amiodarone tablet 200 mg 200 mg Oral QID (WM & HS) Nicanor Freitas MD    200 mg at 02/19/17 1303    aspirin EC tablet 81 mg 81 mg Oral Daily Cristy Cullen MD    81 mg at 02/19/17 1304    atorvastatin tablet 40 mg 40 mg Oral Daily Christos Hernández MD    40 mg at 02/19/17 1304    dextrose 50% injection 12.5 g 12.5 g Intravenous PRN Christos Hernández MD          famotidine (PF) 20 mg/2 mL injection 20 mg 20 mg Intravenous BID Christos Hernández MD    20 mg at 02/19/17 1304    furosemide injection 40 mg 40 mg Intravenous Daily Cristy Cullen MD    40 mg at 02/19/17 1304    glucagon (human recombinant) injection 1 mg 1 mg Intramuscular PRN Christos Hernández MD          heparin (porcine) injection 5,000 Units 5,000 Units Subcutaneous Q8H Christos Hernández MD    5,000 Units at 02/19/17 0559    hydrALAZINE tablet 25 mg 25 mg Oral Q8H Cristy Cullen MD    25 mg at 02/19/17 0559    insulin aspart pen 1-10 Units 1-10 Units Subcutaneous Q6H PRN Christos Hernández MD    2 Units at 02/19/17 1305    insulin detemir pen 20 Units 20 Units Subcutaneous BID Cristy Cullen MD    20 Units at 02/19/17 1305    labetalol injection 10 mg 10 mg Intravenous Q4H PRN JOHNATHON Chacon MD    10 mg at 02/17/17 0844    metoprolol tartrate (LOPRESSOR) tablet 50 mg 50 mg Oral TID Cristy Cullen MD    50 mg at 02/19/17 0559    nifedipine 30 MG ORAL TR24 24 hr tablet 60 mg 60 mg Oral Daily Cristy Cullen MD    60 mg at 02/19/17 1304    sodium chloride 0.9% bolus 500 mL 500 mL Intravenous Continuous PRN Christos BACH  MD Edgar                  Social History            Social History    Marital status: Unknown       Spouse name: N/A    Number of children: N/A    Years of education: N/A           Social History Main Topics    Smoking status: Unknown If Ever Smoked    Smokeless tobacco: None    Alcohol use None    Drug use: None    Sexual activity: Not Asked           Other Topics Concern    None          Social History Narrative    None         History reviewed. No pertinent family history.     Temp: [97 °F (36.1 °C)-98.6 °F (37 °C)] 98.1 °F (36.7 °C)  Pulse: [] 104  Resp: [20-28] 20  SpO2: [93 %-99 %] 99 %  BP: (118-128)/(61-77) 127/66        PHYSICAL EXAM:     Well developed, well nourished, non-verbal but awake and appears very angry  NCAT Bipap in place  Neck supple no LAD  Irr Irr HR in 60's No rubs +S4  Course BS upper AW no crackles apprec.  Abd soft NTND +BS, obese  LUE severely deformed with thumb and 3rd digit remaining. Healed scars present  RUE flaccid paralysis  No LE edema, CC.     Labs: Reveiwed     EKG RESULTS: Reviewed     Rad: MRI/MRA showed an occlusion of the left superior division of a segment of the MCA resulting in a large left cerebral infarction involving the left parietal, temporal and frontal cortices.      CXR:  Enteric tube tip below left hemidiaphragm in appropriate position.    Cardiomegaly with interval increased attenuation of the pulmonary parenchyma and associated stable small bilateral pleural effusions.  The findings are suggestive of probable worsening CHF.  Clinical correlation and followup are suggested.    Nonspecific lucency in the right cardiophrenic angle.       ASSESSMENT AND PLAN:     76 YO male s/o acute ischemic left MCA stroke and hypertensive emergency. 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     SONYA on CKD3/4- baseline unclear, but creatinine trending up since admission. He did undergo a dye load with the MRA on admission which might have induced a ADDI. Will order  routine renal workup and hold any further lasix for now. I think he has upper respiratory exam reflects upper AW congestion due to his inability to swallow. No nephrotoxins. Suspect underlying CKD due to combo of hypertension and DM based on history. Discussed plan with Dr. Cullen. Will increase free water per NGT to 1500 cc/day, monitor Ins and Outs closely.     HTN: As now. No ACE, ARB, or diuretic.     Afib with RVR, new onset: Per Cards. On amiodarone.     Protein-Calorie malnutrition: PEG planned due to dysphagia. NGT feeds for now with Diabeta Source and free water as noted above.     DM: Per primary     Thanks for consult. Will follow along with you.     Hyperuricemia: On allopurinol but noncompliant of late per family. Check uric acid level to see if urate neph could be playing a role here.    Thank you for consult I am happy to follow along during patient's hospital stay.

## 2017-02-19 NOTE — ASSESSMENT & PLAN NOTE
- Patient has had some difficulty handling his secretions, but pulmonary edema not evident.  - No evidence systolic heart failure although has some diastolic dysfunction.

## 2017-02-19 NOTE — ASSESSMENT & PLAN NOTE
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  - Patient on allopurinol for hyperuricemia  - Proteinuria with diabetes as contributor.  - Lisinopril held for increasing creatinine but will eventually need ACE or ARB for proteinuria.  - Increased free water intake yesterday without much improvement  - Consult nephrology

## 2017-02-19 NOTE — PLAN OF CARE
Problem: Occupational Therapy Goal  Goal: Occupational Therapy Goal  Goals to be met by: 2/24/2017    Patient will increase functional independence with ADLs by performing:    Grooming while bedlevel with Moderate Assistance.  Sitting at edge of bed x20 minutes with Stand-by Assistance.  Supine to sit with Maximum Assistance.  Stand pivot transfers assessment without distress.  Increased functional strength to 2/5 for RUE.  RUE Upper extremity exercise program 10 reps per handout, with assistance as needed.   Outcome: Ongoing (interventions implemented as appropriate)  Pt presented with improved sitting balance and tolerance however still requiring max A for sitting balance and tolerance. Pt continues to present with decreased initiation, following of commands, attention to R field, sitting balance and tolerance. He tolerated sitting EOB with support at trunk and input at scapula and following down RLE. Pt became agitated and presented with a posterior lean after sitting EOB. Pt redirected and returned back to bed with restraint on.  Pt would benefit from skilled occupational therapy intervention for increased activity tolerance and independence in ADL's.

## 2017-02-20 PROBLEM — E11.319 DIABETIC RETINOPATHY ASSOCIATED WITH TYPE 2 DIABETES MELLITUS: Status: ACTIVE | Noted: 2017-01-01

## 2017-02-20 NOTE — SUBJECTIVE & OBJECTIVE
Interval History:  Patient more interactive, moving more and vocalizing.  Tolerating tube feeding.    Review of Systems   Unable to perform ROS: Patient nonverbal     Objective:     Vital Signs (Most Recent):  Temp: 97.1 °F (36.2 °C) (02/20/17 1512)  Pulse: 96 (02/20/17 1600)  Resp: (!) 24 (02/20/17 1512)  BP: 108/60 (02/20/17 1512)  SpO2: 95 % (02/20/17 1512) Vital Signs (24h Range):  Temp:  [97.1 °F (36.2 °C)-99.3 °F (37.4 °C)] 97.1 °F (36.2 °C)  Pulse:  [] 96  Resp:  [20-28] 24  SpO2:  [88 %-98 %] 95 %  BP: (108-148)/(59-82) 108/60     Weight: 76.9 kg (169 lb 8.5 oz)  Body mass index is 24.33 kg/(m^2).    Intake/Output Summary (Last 24 hours) at 02/20/17 1758  Last data filed at 02/20/17 1400   Gross per 24 hour   Intake             1690 ml   Output              550 ml   Net             1140 ml      Physical Exam   Constitutional: He appears well-developed and well-nourished.   HENT:   Head: Normocephalic and atraumatic.   Eyes: Conjunctivae are normal. Pupils are equal, round, and reactive to light.   Squints left eye; right eye more open.  Tracks with both eyes.   Neck: Normal range of motion. Neck supple. No thyromegaly present.   Cardiovascular:   Irregularly irregular rhythm with HR in the 60's.   Pulmonary/Chest: He has no wheezes. He has no rales.   Breathing unlabored, unable to take a deep breath on command.  Coarse upper airway noise.   Abdominal: Soft. Bowel sounds are normal. He exhibits no distension. There is no tenderness.   Musculoskeletal:   Left hand deformed with only the thumb and 3rd digit remaining, heavily scarred.     Neurological:   Paralyzed on the right.  Expressive and receptive aphasia.  Does not follow commands.   Skin: Skin is warm and dry.       Significant Labs: All pertinent labs within the past 24 hours have been reviewed.    Significant Imaging: I have reviewed all pertinent imaging results/findings within the past 24 hours.

## 2017-02-20 NOTE — PLAN OF CARE
Problem: Patient Care Overview  Goal: Plan of Care Review  Outcome: Ongoing (interventions implemented as appropriate)  Patient free of falls, injury, or skin breakdown during shift.  Nonverbal indicators of pain absent.  Patient a bit restless, attempts to grab ward tubing and NGT at times; patient educated and MD made aware.  Afib on telemetry.  Ward maintained and urine kept on ice for 24hr urine collection.  Tube feeds as ordered; NGT without complication.  Incontinence care provided prn.  SCDs maintained.  Turned q2h, positioned with wedge and pillows.  Mepilex remains cdi on greater trochanters placed by previous night shift RN.  Bed low and locked, side rails x 3, bed alarm on, call bell in reach.  Patient resting comfortably in bed.  Will continue to monitor.

## 2017-02-20 NOTE — PLAN OF CARE
Problem: Occupational Therapy Goal  Goal: Occupational Therapy Goal  Goals to be met by: 2/24/2017    Patient will increase functional independence with ADLs by performing:    Grooming while bedlevel with Moderate Assistance.  Sitting at edge of bed x20 minutes with Stand-by Assistance.  Supine to sit with Maximum Assistance.  Stand pivot transfers assessment without distress.  Increased functional strength to 2/5 for RUE.  RUE Upper extremity exercise program 10 reps per handout, with assistance as needed.   Outcome: Ongoing (interventions implemented as appropriate)  Pt was alert and turned to the sound of his name from his L side. Pt supine<>sit with total assist but pt was able to initiate verbal command of moving his LLE towards EOB. Pt demonstrated improved sitting balance at EOB for approximately x3 minutes but not improved tolerance as he became agitated and had a strong posterior lean and attempted to pull his ward catheter, nursing notified and returned patient to supine. Bed mobility to roll bilaterally and bridge to HOB with total assist x2. Pt would continue to benefit from skilled OT to improve activity tolerance and independence in ADLs.

## 2017-02-20 NOTE — PT/OT/SLP PROGRESS
Physical Therapy  Treatment    Esteban Goins   MRN: [id number]   Admitting Diagnosis: Acute ischemic left MCA stroke    PT Received On: 02/20/17  PT Start Time: 0945     PT Stop Time: 1028    PT Total Time (min): 43 min       Billable Minutes:  Therapeutic Activity 10, Therapeutic Exercise 10 and Neuromuscular Re-education 23    Treatment Type: Treatment  PT/PTA: PT     PTA Visit Number: 0       General Precautions: Standard, aspiration, fall  Orthopedic Precautions: N/A   Braces: N/A    Do you have any cultural, spiritual, Sikhism conflicts, given your current situation?: unable to assess; pt nonverbal    Subjective:  Communicated with nurse prior to session.  Pt groaning and gesturing during session.     Pain Rating Post-Intervention:  (Groaning and withdrawing with oral care. Unable to locate or quantify pain)    Objective:   Patient found with: oxygen, NG tube R side lying with wedge pillow.    Functional Mobility:  Bed Mobility:   Rolling/Turning to Left: Total assistance (with drawsheet for wedge pillow placement)  Scooting/Bridging: Total Assistance, With assist of 2  Supine to Sit: Total Assistance  Sit to Supine: Total Assistance, Maximum Assistance (Pt demonstrated ability to lower trunk and weight bear through L elbow with assist at trunk for controlled descent. )        Balance:   Static Sit: Max A<>total A at trunk; pt demonstrated use of L UE for support with postural adjustments (between bed and rail)  Dynamic Sit: Total A for anterior/posterior and lateral weight shifting (x 10 reps each) again pt demonstrating change of positioning of L UE for support.     Therapeutic Activities and Exercises:  PT assisted patient in D1/D2 PNF R UE and R LE x 12 reps. Noted decreased tone compared to previous session.   Proprioceptive tactile input through axis of R UE and R LE in sitting. Active assist for R LE ankle pumps, long arc quads, hip internal/external rotation, and hip flexion in sitting (with total A at  trunk).     AM-PAC 6 CLICK MOBILITY  How much help from another person does this patient currently need?   1 = Unable, Total/Dependent Assistance  2 = A lot, Maximum/Moderate Assistance  3 = A little, Minimum/Contact Guard/Supervision  4 = None, Modified [place]/Independent    Turning over in bed (including adjusting bedclothes, sheets and blankets)?: 1  Sitting down on and standing up from a chair with arms (e.g., wheelchair, bedside commode, etc.): 1  Moving from lying on back to sitting on the side of the bed?: 1  Moving to and from a bed to a chair (including a wheelchair)?: 1  Need to walk in hospital room?: 1  Climbing 3-5 steps with a railing?: 1  Total Score: 6    AM-PAC Raw Score CMS G-Code Modifier Level of Impairment Assistance   6 % Total / Unable   7 - 9 CM 80 - 100% Maximal Assist   10 - 14 CL 60 - 80% Moderate Assist   15 - 19 CK 40 - 60% Moderate Assist   20 - 22 CJ 20 - 40% Minimal Assist   23 CI 1-20% SBA / CGA   24 CH 0% Independent/ Mod I     Patient left left sidelying with all lines intact, call button in reach, bed alarm on and nurse notified.    Assessment:  Esteban Goins is a 77 y.o. male with a medical diagnosis of Acute ischemic left MCA stroke   Patient progressing towards goals. Improved attention to voice and mobility. Purposeful movement noted with unaffected L UE and L LE. Noted stat lock broken and Marcial unsecured, therefore replaced and secured Marcial in stat lock as well as cleaned Marcial. Additionally noted thick drying oral secretions; performed oral care. Nursing notified. Improved quality and decreased assist required for mobility including no max A for sit> supine and max A<>total A for static sitting with use of L UE for balance. Will continue to follow and progress as tolerated.     Rehab identified problem list/impairments: Rehab identified problem list/impairments: weakness, impaired self care skills, impaired functional mobilty, impaired balance, decreased lower  extremity function, decreased upper extremity function, decreased ROM, impaired coordination, impaired cognition    Rehab potential is fair.    Activity tolerance: Good    Discharge recommendations: Discharge Facility/Level Of Care Needs: rehabilitation facility     Barriers to discharge: Barriers to Discharge: Inaccessible home environment, Decreased caregiver support    Equipment recommendations:       GOALS:   Physical Therapy Goals        Problem: Physical Therapy Goal    Goal Priority Disciplines Outcome Goal Variances Interventions   Physical Therapy Goal     PT/OT, PT Ongoing (interventions implemented as appropriate)     Description:  Goals to be met by: 3/14/17     Patient will increase functional independence with mobility by performin. Supine to sit with min A.  2. Sit to supine with min A.   3. Rolling R and L with min A.   4. Sitting at edge of bed >5 minutes with min A.   5. PT will assess sit<>stand.                 PLAN:    Patient to be seen daily  to address the above listed problems via gait training, therapeutic activities, therapeutic exercises, neuromuscular re-education  Plan of Care expires: 17  Plan of Care reviewed with: patient         Nely Ron, PT  2017

## 2017-02-20 NOTE — PROGRESS NOTES
[facility]  Adult Nutrition  Consult Note    SUMMARY     Recommendations  1. Continue current tube feeding  2. RD to monitor   Goals: provide >85%EEN via enteral feeds  Nutrition Goal Status: new  Communication of RD Recs: other (comment) (sticky note)    Continuum of Care Plan  D/C planning: provide >85%EEN via enteral feeds if pt unable to tolerate PO diet       Reason for Assessment  Reason for Assessment: RD follow-up  Diagnosis: stroke/CVA  Relevent Medical History: Dm 2, CKD III, HTN   Interdisciplinary Rounds: did not attend    Nutrition Prescription Ordered  Current Diet Order: NPO  Current Nutrition Support Formula Ordered:  (Diabetisource AC)  Current Nutrition Support Frequency Ordered:  (330ml 5 times daily)        Evaluation of nutrients:  1650kcal  99g pro  165 g CHO  1350ml water     Nutrition Risk Screen   Nutrition Risk Screen: no indicators present    Nutrition/Diet History  Patient Reported Diet/Restrictions/Preferences: general  Typical Food/Fluid Intake: NPO  Food Preferences: NPO  Factors Affecting Nutritional Intake: NPO, difficulty/impaired swallowing    Labs/Tests/Procedures/Meds  Pertinent Labs Reviewed: reviewed  Pertinent Medications Reviewed: reviewed  Pertinent Medications Comments: atorvastatin, insulin (prn)    Physical Findings  Overall Physical Appearance: overweight  Skin: intact    Anthropometrics  Height (inches): 70 in  Weight Method: Bed Scale  Weight (kg): 76.9 kg  Ideal Body Weight (IBW), Male: 166 lb  % Ideal Body Weight, Male (lb): 102.13 lb  BMI (kg/m2): 24.33  BMI Grade: 18.5-24.9 - normal    Estimated/Assessed Needs  Weight Used For Calorie Calculations: 76.9 kg (169 lb 8.5 oz)   Height (cm): 177.8 cm  Energy Need Method: [place]-St Jeor (1644-1794kcal/day (X1.1-1.2))  RMR ([place]-St. Jeor Equation): 1495.8  Weight Used For Protein Calculations: 78.6 kg (173 lb 4.5 oz)  Protein Requirements: 63-71g/day  0.8 gm Protein (gm): 63.01 and 1.0 gm Protein  (gm): 78.76  Fluid Need Method: RDA Method (1 ml per kcal or per MD)     Nutrition Diagnosis:  Problem: Difficulty swallowing  Etiology: CVA  Symptoms: NPO  Status: continues      Monitor and Evaluation  Food and Nutrient Intake: energy intake, food and beverage intake, enteral nutrition intake  Food and Nutrient Adminstration: diet order, enteral and parenteral nutrition administration  Physical Activity and Function: nutrition-related ADLs and IADLs  Anthropometric Measurements: weight, weight change  Biochemical Data, Medical Tests and Procedures: electrolyte and renal panel, gastrointestinal profile, glucose/endocrine profile, inflammatory profile, lipid profile  Nutrition-Focused Physical Findings: overall appearance    Nutrition Risk    Level of Risk: moderate follow twice weekly    Nutrition Follow-Up    RD Follow-up?: Yes    Assessment and Plan    No new Assessment & Plan notes have been filed under this hospital service since the last note was generated.  Service: Nutrition

## 2017-02-20 NOTE — PLAN OF CARE
Problem: Patient Care Overview  Goal: Plan of Care Review  Outcome: Ongoing (interventions implemented as appropriate)  Patient received on 3.5 L NC SAT 96%, Q6 TX given. Will continue to monitor.

## 2017-02-20 NOTE — PROGRESS NOTES
Renal Progress Note    Admit Date: 2/13/2017   LOS: 7 days     SUBJECTIVE:     No acute issues overnight.  No improvement in neurological status overnight.    Scheduled Meds:   albuterol-ipratropium 2.5mg-0.5mg/3mL  3 mL Nebulization Q6H    allopurinol  200 mg Oral Daily    amiodarone  200 mg Oral QID (WM & HS)    aspirin  81 mg Oral Daily    atorvastatin  40 mg Oral Daily    famotidine (PF)  20 mg Intravenous BID    heparin (porcine)  5,000 Units Subcutaneous Q8H    hydrALAZINE  25 mg Oral Q8H    insulin detemir  20 Units Subcutaneous BID    metoprolol tartrate  50 mg Oral TID    nifedipine 30 MG ORAL TR24  60 mg Oral Daily       OBJECTIVE:     Vital Signs Range (Last 24H):  Temp:  [97.1 °F (36.2 °C)-99.3 °F (37.4 °C)]   Pulse:  []   Resp:  [20-28]   BP: (109-148)/(59-82)   SpO2:  [88 %-99 %]     I & O (Last 24H):  Intake/Output Summary (Last 24 hours) at 02/20/17 1210  Last data filed at 02/20/17 1000   Gross per 24 hour   Intake             1990 ml   Output             1450 ml   Net              540 ml       Physical Exam:  Well developed, well nourished, non-verbal but awake  NCAT Bipap in place  Neck supple no LAD  Irr Irr HR in 60's No rubs +S4  Course BS upper AW no crackles apprec.  Abd soft NTND +BS, obese  LUE severely deformed with thumb and 3rd digit remaining. Healed scars present  RUE flaccid paralysis  No LE edema, CC.          Laboratory:  CBC:   Recent Labs  Lab 02/20/17  0516   WBC 13.22*   RBC 5.50   HGB 10.3*   HCT 33.2*      MCV 60*   MCH 18.7*   MCHC 31.0*     BMP:   Recent Labs  Lab 02/15/17  0421  02/20/17  0516   *  < > 131*     < > 142   K 4.3  < > 3.9   *  < > 107   CO2 9*  < > 22*   BUN 25*  < > 102*   CREATININE 2.2*  < > 4.3*   CALCIUM 8.5*  < > 8.5*   MG 1.7  --   --    < > = values in this interval not displayed.    ASSESSMENT/PLAN:        78 YO male s/o acute ischemic left MCA stroke and hypertensive emergency. Now with SONYA on advanced  CKD3-4.      Non-Oliguric SONYA on CKD3/4- baseline unclear, but creatinine now stable. He did undergo a dye load with the MRA on admission which might have induced a ADDI. Proteinuria about 1.4 grams by ratio.  Suspect underlying CKD due to combo of hypertension and DM based on history (has +retinopathy). Serology neg thus far, US shows chronic MRDz.. Continue free water per NGT to 1500 cc/day, monitor Ins and Outs closely.        HTN: As now. No ACE, ARB, or diuretic.      Afib with RVR, new onset: Per Cards. On amiodarone.      Protein-Calorie malnutrition: PEG planned due to dysphagia. NGT feeds for now with Diabeta Source and free water as noted above.    Leukocytosis:  Source?  Afebrile.  Urine cx from yesterday NG.        DM: Per primary.  + History of retinopathy so most likely underlying Diabetic nephropathy as well.      Hyperuricemia: On allopurinol but noncompliant of late per family.  Uric acid level 10.3 but doubt urate neph  playing a role here.    Secondary HPT and Vit D defic:  Start oral Vit D3 and Calcitriol    Anemia:  Iron deficient. Likely of CKD.  Will start oral iron for now.  No need for procrit as of yet.

## 2017-02-20 NOTE — NURSING
Marcial catheter leaking around the meatus.  Attempts today to manipulate, deflate/reinflate and irrigate were no success.  Dr. Cullen informed. Requested and permitted to change the 16 fr catheter to an 18 fr catheter.  Catheter placed without problem with sedimented yellow urine draining.  Urine kept cold on ice for 24 hour urine collection.

## 2017-02-20 NOTE — PROGRESS NOTES
[facility]  Cardiology  Progress Note    Patient Name: Esteban Goins  MRN: [id number]  Admission Date: 2/13/2017  Hospital Length of Stay: 7 days  Code Status: Full Code   Attending Physician: Cristy Cullen MD   Primary Care Physician: [facility]  Expected Discharge Date:   Principal Problem:Acute ischemic left MCA stroke    Subjective:     Brief HPI:    78 yo man with hypertension. Presents after a cerebrovascular accident with right sided weakness on 2/13/2017. On 2/14/2017 he went into atrial fibrillation and was begun on amiodarone for rate control.    Hospital Course:     2/14/2017: Began amiodarone.    Interval History:     Been in atrial fibrillation last few days.    Review of Systems   Unable to perform ROS: patient nonverbal     Objective:     Vital Signs (Most Recent):  Temp: 97.1 °F (36.2 °C) (02/20/17 0735)  Pulse: 90 (02/20/17 0800)  Resp: (!) 22 (02/20/17 0735)  BP: 109/63 (02/20/17 0735)  SpO2: 96 % (02/20/17 0735) Vital Signs (24h Range):  Temp:  [97.1 °F (36.2 °C)-99.3 °F (37.4 °C)] 97.1 °F (36.2 °C)  Pulse:  [] 90  Resp:  [20-28] 22  SpO2:  [93 %-99 %] 96 %  BP: (109-148)/(63-82) 109/63     Weight: 76.9 kg (169 lb 8.5 oz)  Body mass index is 24.33 kg/(m^2).    SpO2: 96 %  O2 Device (Oxygen Therapy): nasal cannula w/ humidification      Intake/Output Summary (Last 24 hours) at 02/20/17 0928  Last data filed at 02/20/17 0650   Gross per 24 hour   Intake             1490 ml   Output             1450 ml   Net               40 ml       Lines/Drains/Airways     Drain                 NG/OG Tube 02/13/17 1455 nasogastric Right nostril 3 days         Urethral Catheter 02/13/17 1015 3 days          Peripheral Intravenous Line                 Peripheral IV - Single Lumen 02/14/17 1455 Right Antecubital 2 days         Peripheral IV - Single Lumen 02/15/17 Right Forearm 1 day                Physical Exam   Constitutional: No distress.   Neck: No JVD  present. No thyromegaly present.   Cardiovascular: Normal rate, S1 normal and S2 normal.  An irregularly irregular rhythm present. Exam reveals gallop. Exam reveals no S3.    Pulmonary/Chest: Effort normal. He has rhonchi.   Abdominal: Normal appearance. There is no tenderness.   Skin: Skin is warm and dry.     Current Medications:     albuterol-ipratropium 2.5mg-0.5mg/3mL  3 mL Nebulization Q6H    allopurinol  200 mg Oral Daily    amiodarone  200 mg Oral QID (WM & HS)    aspirin  81 mg Oral Daily    atorvastatin  40 mg Oral Daily    famotidine (PF)  20 mg Intravenous BID    heparin (porcine)  5,000 Units Subcutaneous Q8H    hydrALAZINE  25 mg Oral Q8H    insulin detemir  20 Units Subcutaneous BID    metoprolol tartrate  50 mg Oral TID    nifedipine 30 MG ORAL TR24  60 mg Oral Daily     Current Laboratory Results:    Recent Results (from the past 24 hour(s))   POCT glucose    Collection Time: 02/19/17 11:22 AM   Result Value Ref Range    POCT Glucose 194 (H) 70 - 110 mg/dL   POCT glucose    Collection Time: 02/19/17 12:55 PM   Result Value Ref Range    POCT Glucose 166 (H) 70 - 110 mg/dL   Protein electrophoresis, serum    Collection Time: 02/19/17  3:21 PM   Result Value Ref Range    Protein, Serum 6.2 6.0 - 8.4 g/dL   TSH    Collection Time: 02/19/17  3:21 PM   Result Value Ref Range    TSH 5.011 (H) 0.400 - 4.000 uIU/mL   T4, free    Collection Time: 02/19/17  3:21 PM   Result Value Ref Range    Free T4 0.92 0.71 - 1.51 ng/dL   Vitamin D    Collection Time: 02/19/17  3:21 PM   Result Value Ref Range    Vit D, 25-Hydroxy 16 (L) 30 - 96 ng/mL   PTH, intact    Collection Time: 02/19/17  3:21 PM   Result Value Ref Range    PTH, Intact 198.1 (H) 9.0 - 77.0 pg/mL   Uric acid    Collection Time: 02/19/17  3:21 PM   Result Value Ref Range    Uric Acid 10.3 (H) 3.4 - 7.0 mg/dL   Dial's Stain, Urine Random    Collection Time: 02/19/17  5:27 PM   Result Value Ref Range    Dial's Stain, Ur No eosinophils seen  No eosinophils seen   Protein / creatinine ratio, urine    Collection Time: 02/19/17  5:27 PM   Result Value Ref Range    Protein, Urine Random 40 (H) 0 - 15 mg/dL    Creatinine, Random Ur 28.0 23.0 - 375.0 mg/dL    Prot/Creat Ratio, Ur 1.43 (H) 0.00 - 0.20   Sodium, urine, random    Collection Time: 02/19/17  5:27 PM   Result Value Ref Range    Sodium Urine Random 94 20 - 250 mmol/L   Creatinine, urine, random    Collection Time: 02/19/17  5:27 PM   Result Value Ref Range    Creatinine, Random Ur 28.0 23.0 - 375.0 mg/dL   POCT glucose    Collection Time: 02/19/17  5:33 PM   Result Value Ref Range    POCT Glucose 141 (H) 70 - 110 mg/dL   POCT glucose    Collection Time: 02/19/17  9:56 PM   Result Value Ref Range    POCT Glucose 171 (H) 70 - 110 mg/dL   CBC auto differential    Collection Time: 02/20/17  5:16 AM   Result Value Ref Range    WBC 13.22 (H) 3.90 - 12.70 K/uL    RBC 5.50 4.60 - 6.20 M/uL    Hemoglobin 10.3 (L) 14.0 - 18.0 g/dL    Hematocrit 33.2 (L) 40.0 - 54.0 %    MCV 60 (L) 82 - 98 fL    MCH 18.7 (L) 27.0 - 31.0 pg    MCHC 31.0 (L) 32.0 - 36.0 %    RDW 14.8 (H) 11.5 - 14.5 %    Platelets 300 150 - 350 K/uL    MPV SEE COMMENT 9.2 - 12.9 fL    Gran # 9.0 (H) 1.8 - 7.7 K/uL    Lymph # 2.2 1.0 - 4.8 K/uL    Mono # 1.5 (H) 0.3 - 1.0 K/uL    Eos # 0.4 0.0 - 0.5 K/uL    Baso # 0.02 0.00 - 0.20 K/uL    Gran% 68.8 38.0 - 73.0 %    Lymph% 16.9 (L) 18.0 - 48.0 %    Mono% 11.1 4.0 - 15.0 %    Eosinophil% 3.0 0.0 - 8.0 %    Basophil% 0.2 0.0 - 1.9 %    Platelet Estimate Appears normal     Aniso Slight     Poik Slight     Poly Occasional     Ovalocytes Occasional     Large/Giant Platelets Present     Differential Method Automated    Basic metabolic panel    Collection Time: 02/20/17  5:16 AM   Result Value Ref Range    Sodium 142 136 - 145 mmol/L    Potassium 3.9 3.5 - 5.1 mmol/L    Chloride 107 95 - 110 mmol/L    CO2 22 (L) 23 - 29 mmol/L    Glucose 131 (H) 70 - 110 mg/dL    BUN, Bld 102 (H) 8 - 23 mg/dL     Creatinine 4.3 (H) 0.5 - 1.4 mg/dL    Calcium 8.5 (L) 8.7 - 10.5 mg/dL    Anion Gap 13 8 - 16 mmol/L    eGFR if African American 14 (A) >60 mL/min/1.73 m^2    eGFR if non African American 12 (A) >60 mL/min/1.73 m^2   POCT glucose    Collection Time: 02/20/17  6:16 AM   Result Value Ref Range    POCT Glucose 121 (H) 70 - 110 mg/dL     Current Imaging Results:    Imaging Results         US Retroperitoneal Complete (Kidney and (Final result) Result time:  02/20/17 01:05:29    Final result by Ryan George MD (02/20/17 01:05:29)    Impression:        Sonographic features suggestive of chronic medical renal disease. No evidence of hydronephrosis.      Electronically signed by: RYAN GEORGE  Date:     02/20/17  Time:    01:05     Narrative:    Time of Procedure: 02/19/17 19:41:31  Accession # [id number]    Reason for study: Acute kidney injury     Comparison: None    Technique: Routine renal ultrasound was performed.    Findings:   Please note examination is technically limited secondary to patient body habitus and inability to breath-hold.    The kidneys measure 11.0 cm on the right and 10.3 cm on the left. There is bilateral cortical thinning, decreased corticomedullary distinction, and increased cortical echogenicity. There is no evidence of hydronephrosis. There are small bilateral simple appearing renal cysts.Perfusion to the kidneys is decreased. Resistive indices are elevated and as follow: 0.80 on the right and 0.86 on the left. The urinary bladder is decompressed around a Marcial catheter.            X-Ray Chest AP Portable (Final result) Result time:  02/18/17 01:45:09    Final result by Syd Kingsley MD (02/18/17 01:45:09)    Impression:       Enteric tube tip below left hemidiaphragm in appropriate position.    Cardiomegaly with interval increased attenuation of the pulmonary parenchyma and associated stable small bilateral pleural effusions.  The findings are suggestive of probable worsening CHF.  Clinical  correlation and followup are suggested.    Nonspecific lucency in the right cardiophrenic angle.                  Electronically signed by: GAYATHRI MEDINA MD  Date:     02/18/17  Time:    01:45     Narrative:    Exam: [id number]  02/18/17  01:06:46 [id number] ([facility]) : XR CHEST AP PORTABLE    Technique:    Single frontal chest x-ray.    Comparison:    02/16/2017.    Findings:      Enteric tube courses below the left hemidiaphragm with its tip in the right upper abdominal quadrant in the region of the gastric antrum.  Previous monitoring EKG leads have been removed.      The trachea is unremarkable.There is stable enlargement of the cardiomediastinal silhouette.  There is no evidence of free air beneath the hemidiaphragms.  There is stable obscuration of both costophrenic angles.  There is no evidence of a pneumothorax.  There is nonspecific lucency in the right cardiophrenic angle.   There is increased attenuation of the pulmonary parenchyma.  There are degenerative changes in the osseous structures.            X-Ray Chest 1 View (Final result) Result time:  02/16/17 09:18:52    Final result by Jcak Holly Jr., MD (02/16/17 09:18:52)    Narrative:    Chest single view compared to February 14.  NG tube directed toward the gastric antrum.  Increased opacification at the bases likely some pleural fluid left greater than right.  Heart remains enlarged.  No pneumothorax.    Impression NG tube in satisfactory position.      Electronically signed by: JACK HOLLY MD  Date:     02/16/17  Time:    09:18             X-Ray Chest AP Portable (Final result) Result time:  02/14/17 16:53:12    Final result by Anson Garcia DO (02/14/17 16:53:12)    Impression:        See Above       Electronically signed by: ANSON GARCIA DO  Date:     02/14/17  Time:    16:53     Narrative:    Single portable frontal view of the chest    Comparison: None    Results: Ill-defined right basilar opacity concerning for effusion with atelectasis. There is  no large pneumothorax. Atherosclerotic aorta. Nasogastric tube identified with looping configuration the epigastric region may be in the gastric cardia or distal esophagus. Clinical correlation and advancement advised. No large lung consolidation.            MRI Brain Without Contrast (Final result) Result time:  02/13/17 21:01:31    Final result by Jose G Amor MD (02/13/17 21:01:31)    Impression:        Occlusion of the left superior division of the M2 segment of the MCA with resulting large left cerebral infarct involving the left parietal, temporal, and frontal cortices.      Electronically signed by: JOSE G AMOR MD  Date:     02/13/17  Time:    21:01     Narrative:    MRI brain, MRA head,     02/13/17 19:50:00    Accession# [id number]      CLINICAL INDICATION: 77 year old M with stroke     TECHNIQUE: Multiplanar multisequence MR imaging of the brain was performed without intravenous contrast.  Examination performed in conjunction with a 3-D time-of-flight noncontrast MR angiogram of the intracranial vasculature.  Multiple MIP reconstructions were performed.    COMPARISON: No priors.    FINDINGS:  There is a large infarct of the left parietal lobe extending anteriorly to involve the very posterior sulci of the left frontal and temporal lobes. Otherwise, there are a few scattered T2 hyperintensities in the periventricular white matter. Possible remote infarct of the right centrum semi-ovale. The no intracranial blood products. No significant mass effect. No midline shift. Visualized portions of the calvarium and sinuses show no focal abnormality.    MRA:   Visualized portions of the carotid arteries and vertebral arteries are normal in course and caliber. Basilar artery is widely patent. Posterior circulation is intact. Bilateral posterior communicating arteries are intact.    There is mild irregular stenoses of the bilateral MCA. There is occlusion of the superior branch of the M2 segments of the MCA.             MRA Brain (Final result) Result time:  02/13/17 21:01:31    Final result by Jose G Amor MD (02/13/17 21:01:31)    Impression:        Occlusion of the left superior division of the M2 segment of the MCA with resulting large left cerebral infarct involving the left parietal, temporal, and frontal cortices.      Electronically signed by: JOSE G AMOR MD  Date:     02/13/17  Time:    21:01     Narrative:    MRI brain, MRA head,     02/13/17 19:50:00    Accession# [id number]      CLINICAL INDICATION: 77 year old M with stroke     TECHNIQUE: Multiplanar multisequence MR imaging of the brain was performed without intravenous contrast.  Examination performed in conjunction with a 3-D time-of-flight noncontrast MR angiogram of the intracranial vasculature.  Multiple MIP reconstructions were performed.    COMPARISON: No priors.    FINDINGS:  There is a large infarct of the left parietal lobe extending anteriorly to involve the very posterior sulci of the left frontal and temporal lobes. Otherwise, there are a few scattered T2 hyperintensities in the periventricular white matter. Possible remote infarct of the right centrum semi-ovale. The no intracranial blood products. No significant mass effect. No midline shift. Visualized portions of the calvarium and sinuses show no focal abnormality.    MRA:   Visualized portions of the carotid arteries and vertebral arteries are normal in course and caliber. Basilar artery is widely patent. Posterior circulation is intact. Bilateral posterior communicating arteries are intact.    There is mild irregular stenoses of the bilateral MCA. There is occlusion of the superior branch of the M2 segments of the MCA.            X-Ray Abdomen AP 1 View (Final result) Result time:  02/13/17 17:39:18    Final result by Rosy Hicks MD (02/13/17 17:39:18)    Impression:     As above.      Electronically signed by: ROSY HICKS MD, MD  Date:     02/13/17  Time:    17:39      Narrative:    Comparison: Abdominal radiograph earlier same day at 1610 hrs.    Findings: Single AP portable semiupright view of the mid to upper abdomen which includes the lower chest. The lower abdomen and pelvis are not included in field-of-view. Enteric tube appears to have been retracted with port and tip projected over the medial left upper quadrant, likely within the proximal stomach. Otherwise, no significant interval change of the included lower chest and upper abdomen.            X-Ray Abdomen AP 1 View (Final result) Result time:  02/13/17 16:45:53    Final result by Anson Garcia DO (02/13/17 16:45:53)    Impression:     See Above .      Electronically signed by: ANSON GARCIA DO  Date:     02/13/17  Time:    16:45     Narrative:    Technique: Supine view of the upper abdomen    Comparison: 02/13/2017 at 1606    Results:Interval placement of a feeding tube with tip projected over the right upper abdomen in the region of the expected gastric pylorus/proximal duodenum. External monitors overlie the lower thorax and left mid abdomen. Continued few scattered gas-filled loops of bowel within the upper abdomen.            X-Ray Abdomen AP 1 View (Final result) Result time:  02/13/17 16:41:56    Final result by Anson Garcia DO (02/13/17 16:41:56)    Impression:     See Above .      Electronically signed by: ANSON GARCIA DO  Date:     02/13/17  Time:    16:41     Narrative:    Technique: Supine view the abdomen    Comparison: None    Results:Scattered gas-filled loops of bowel within the abdomen with gas and fecal material projected over the visualized colon. Overall nonspecific bowel gas pattern. No feeding tube projected over the visualized mediastinum or upper abdomen. Clinical correlation advised. Multiple external leads project over the lower thorax.              Assessment and Plan:     Problem List:    Active Diagnoses:    Diagnosis Date Noted POA    PRINCIPAL PROBLEM:  Acute ischemic left MCA  stroke [I63.512] 02/13/2017 Yes    Acute respiratory failure with hypoxia [J96.01] 02/18/2017 Yes    Paroxysmal atrial fibrillation [I48.0] 02/14/2017 Yes    Hemiparesis, right [G81.91] 02/13/2017 Yes    Oral phase dysphagia [R13.11] 02/13/2017 Yes    Aphasia [R47.01] 02/13/2017 Yes    HTN (hypertension), malignant [I10] 02/13/2017 Yes    Iron deficiency anemia [D50.9] 02/13/2017 Yes    CKD (chronic kidney disease), stage III [N18.3] 02/13/2017 Yes    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 Atrial Fibrillation              2/14/2017: Diagnosed with paroxysmal atrial fibrillation.              [id number] (HA2S2V)              2/14/2017: Began amiodarone iv and metoprolol.              On amiodarone 200 mg Q6 and metoprolol 50 mg Q8.   Currently in atrial fibrillation with -110 bpm.     2. Stroke Prevention              On aspirin 81 mg Q24.   Heparin 5,000 U sc Q8.              Would not favor anticoagulation at present.     3. Cerebrovascular Accident              Imaging tests reveal lacunar infarct.        VTE Risk Mitigation         Ordered     heparin (porcine) injection 5,000 Units  Every 8 hours     Route:  Subcutaneous        02/13/17 1421     Medium Risk of VTE  Once      02/13/17 1421     Place sequential compression device  Until discontinued      02/13/17 1421          Anticipated Disposition: Long Term Care    Discharge Needs: Not clear at the present time.    Nicanor Freitas MD  Cardiology  [facility]

## 2017-02-20 NOTE — PLAN OF CARE
Problem: Physical Therapy Goal  Goal: Physical Therapy Goal  Goals to be met by: 3/14/17     Patient will increase functional independence with mobility by performin. Supine to sit with min A.  2. Sit to supine with min A.   3. Rolling R and L with min A.   4. Sitting at edge of bed >5 minutes with min A.   5. PT will assess sit<>stand.    Outcome: Ongoing (interventions implemented as appropriate)  Patient progressing towards goals. Improved attention to voice and mobility. Purposeful movement noted with unaffected L UE and L LE. Noted stat lock broken and Marcial unsecured, therefore replaced and secured Marcial in stat lock as well as cleaned Marcial. Additionally noted thick drying oral secretions; performed oral care. Nursing notified. Pt continues to require total assist for supine> sit, max A for sit> supine and max A<>total A for static sitting with use of L UE for balance. Will continue to follow and progress as tolerated. Please see progress note for detailed plan of care and recommendations.

## 2017-02-20 NOTE — PLAN OF CARE
Problem: NPPV/CPAP (Adult)  Goal: Signs and Symptoms of Listed Potential Problems Will be Absent, Minimized or Managed (NPPV/CPAP)  Signs and symptoms of listed potential problems will be absent, minimized or managed by discharge/transition of care (reference NPPV/CPAP (Adult) CPG).   Outcome: Ongoing (interventions implemented as appropriate)  Still NT suctioning once or twice per shift but no longer tolerating Bipap.

## 2017-02-20 NOTE — PLAN OF CARE
Problem: Fall Risk (Adult)  Goal: Absence of Falls  Patient will demonstrate the desired outcomes by discharge/transition of care.   Outcome: Outcome(s) achieved Date Met:  02/20/17  Fall risk noted.  Yellow bracelet and yellow socks in place. Bed alarm set.    Problem: Infection, Risk/Actual (Adult)  Goal: Infection Prevention/Resolution  Patient will demonstrate the desired outcomes by discharge/transition of care.   Outcome: Ongoing (interventions implemented as appropriate)  No active infection noted.    Problem: Pressure Ulcer Risk (Sandro Scale) (Adult,Obstetrics,Pediatric)  Goal: Skin Integrity  Patient will demonstrate the desired outcomes by discharge/transition of care.   Outcome: Ongoing (interventions implemented as appropriate)  No open skin breakdown... Some discoloration noted to bilateral greater trochanter sites.... Mepilex padded dressings applied.

## 2017-02-20 NOTE — PT/OT/SLP PROGRESS
Occupational Therapy  Treatment    Esteban Goins   MRN: [id number]   Admitting Diagnosis: Acute ischemic left MCA stroke    OT Date of Treatment: 02/20/17   OT Start Time: 1350  OT Stop Time: 1429  OT Total Time (min): 39 min    Billable Minutes:  Therapeutic Activity 25 and Neuromuscular Re-education 11       General Precautions: Standard, fall, NPO  Orthopedic Precautions: N/A  Braces: N/A    Do you have any cultural, spiritual, U[place] conflicts, given your current situation?:  (pt nonverbal)    Subjective:  Communicated with nursing prior to session.  Pt found supine with wedge pillow with nursing present at start of session. Pt turned to the sound of his name from his L side.     Pain Rating:  (pt could not verbalize pain, pt groaned at times during the session)              Pain Rating Post-Intervention:  (pt was agitated and groaning and was unable to verbalize pain)    Objective:  Patient found with: ward catheter, NG tube, telemetry, peripheral IV, oxygen (wedge pillow)     Functional Mobility:  Bed Mobility:  Rolling/Turning to Left: Total assistance  Rolling/Turning Right: Total assistance  Scooting/Bridging: Total Assistance, With assist of 2  Supine to Sit: Total Assistance, With assist of 2  Sit to Supine: Total Assistance, With assist of 2    Transfers:    None    Functional Ambulation: None      Balance:   Static Sit: POOR: Needs MODERATE assist to maintain      Therapeutic Activities and Exercises:  Pt required mod A sitting EOB  And used his L hand for weightbearing onto bed for improved sitting balance. Pt completed bed mobility for supine<>sit and bridging to HOB and bilateral rolling with total assist x2.     AM-PAC 6 CLICK ADL   How much help from another person does this patient currently need?   1 = Unable, Total/Dependent Assistance  2 = A lot, Maximum/Moderate Assistance  3 = A little, Minimum/Contact Guard/Supervision  4 = None, Modified [place]/Independent    Putting on and taking  off regular lower body clothing? : 1  Bathing (including washing, rinsing, drying)?: 1  Toileting, which includes using toilet, bedpan, or urinal? : 1  Putting on and taking off regular upper body clothing?: 1  Taking care of personal grooming such as brushing teeth?: 1  Eating meals?: 1  Total Score: 6     AM-PAC Raw Score CMS G-Code Modifier Level of Impairment Assistance   6 % Total / Unable   7 - 9 CM 80 - 100% Maximal Assist   10 - 14 CL 60 - 80% Moderate Assist   15 - 19 CK 40 - 60% Moderate Assist   20 - 22 CJ 20 - 40% Minimal Assist   23 CI 1-20% SBA / CGA   24 CH 0% Independent/ Mod I     Patient left supine with all lines intact, call button in reach, bed alarm on and nursing notified    ASSESSMENT:  Esteban Goins is a 77 y.o. male with a medical diagnosis of Acute ischemic left MCA stroke and Pt was alert and turned to the sound of his name from his L side. Pt supine<>sit with total assist but pt was able to initiate verbal command of moving his LLE towards EOB. Pt demonstrated improved sitting balance at EOB for approximately x3 minutes however, became agitated and had a strong posterior lean and attempted to pull his ward catheter, nursing notified and returned patient to supine. Bed mobility to roll bilaterally and bridge to HOB with total assist x2. Pt would continue to benefit from skilled OT to improve activity tolerance and independence in ADLs.     Rehab identified problem list/impairments: Rehab identified problem list/impairments: weakness, impaired endurance, impaired self care skills, impaired functional mobilty, impaired cognition, decreased lower extremity function, decreased upper extremity function, decreased safety awareness, impaired fine motor, impaired coordination    Rehab potential is fair.    Activity tolerance: Poor    Discharge recommendations: Discharge Facility/Level Of Care Needs: rehabilitation facility     Barriers to discharge: Barriers to Discharge: Inaccessible  home environment, Decreased caregiver support    Equipment recommendations:  (TBD)     GOALS:   Occupational Therapy Goals        Problem: Occupational Therapy Goal    Goal Priority Disciplines Outcome Interventions   Occupational Therapy Goal     OT, PT/OT Ongoing (interventions implemented as appropriate)    Description:  Goals to be met by: 2/24/2017    Patient will increase functional independence with ADLs by performing:    Grooming while bedlevel with Moderate Assistance.  Sitting at edge of bed x20  minutes with Stand-by Assistance.  Supine to sit with Maximum Assistance.  Stand pivot transfers assessment without distress.  Increased functional strength to 2/5 for RUE.  RUE Upper extremity exercise program  10 reps per handout, with assistance as needed.                Plan:  Patient to be seen 6 x/week to address the above listed problems via self-care/home management, therapeutic activities, therapeutic exercises, neuromuscular re-education, cognitive retraining  Plan of Care expires: 03/14/17  Plan of Care reviewed with: patient         Christianne Shelia, LA[place]ISHA  02/20/2017

## 2017-02-21 NOTE — ASSESSMENT & PLAN NOTE
- Expressive and receptive.  - Patient is unable to speak but is more responsive and making eye contact.

## 2017-02-21 NOTE — PLAN OF CARE
Problem: SLP Goal  Goal: SLP Goal  1. The pt will follow 2-3 simple commands per session, given max visual/verbal cues, and structured environment to facilitate response.  2. The pt will participate in any simple automatic speech task x1 per session, given max multimodal cues.  3. The pt will demonstrate yes/no response x3 per session via any modality, given max multimodal cues.   Continue NPO diet at this time, all goals remain appropriate  Mery Downey CCC-SLP  2/21/2017

## 2017-02-21 NOTE — ASSESSMENT & PLAN NOTE
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  - Patient on allopurinol for hyperuricemia  - Proteinuria with diabetes as contributor.  - Lisinopril held for increasing creatinine but will eventually need ACE or ARB for proteinuria.  - Increased free water intake without much improvement  - Nephrology following

## 2017-02-21 NOTE — PLAN OF CARE
Problem: NPPV/CPAP (Adult)  Goal: Signs and Symptoms of Listed Potential Problems Will be Absent, Minimized or Managed (NPPV/CPAP)  Signs and symptoms of listed potential problems will be absent, minimized or managed by discharge/transition of care (reference NPPV/CPAP (Adult) CPG).   Outcome: Ongoing (interventions implemented as appropriate)  Patient on oxygen; aerosol treatments given as scheduled. V60 BiPAP at bedside for nightly use.

## 2017-02-21 NOTE — PLAN OF CARE
Problem: Occupational Therapy Goal  Goal: Occupational Therapy Goal  Goals to be met by: 2/24/2017    Patient will increase functional independence with ADLs by performing:    Grooming while bedlevel with Moderate Assistance.  Sitting at edge of bed x20 minutes with Stand-by Assistance.  Supine to sit with Maximum Assistance.  Stand pivot transfers assessment without distress.  Increased functional strength to 2/5 for RUE.  RUE Upper extremity exercise program 10 reps per handout, with assistance as needed.   Outcome: Ongoing (interventions implemented as appropriate)  Pt found sitting EOB with PT at start of session. This session addressed visual and somatosensory orientation and attention and dynamic sitting balance. Pt was unable to purposefully reach with LUE when challenged and assisted. Pt was able to fleetingly and inconsistently attend and visually fixate on a blue bottle and the therapist's face. The pt inconsistently responded to somatosensory cues though sitting balance remained poor. Pt would continue to benefit from skilled OT to improve orientation, attention and response to cues.

## 2017-02-21 NOTE — ASSESSMENT & PLAN NOTE
- Speech following and recommend NPO status with tube feeding.  - Secretions evident due to inability to handle them   - GI to place PEG Wednesday after 7 days off Plavix - hopefully this will not be permanent and he can regain some function in rehab.

## 2017-02-21 NOTE — PLAN OF CARE
Pt remain on 3.5L NC ,NC found out pt nose O2 sats on room air 95-97% Treatment regimen remains the same will continue to monitor.

## 2017-02-21 NOTE — PLAN OF CARE
Problem: Physical Therapy Goal  Goal: Physical Therapy Goal  Goals to be met by: 3/14/17     Patient will increase functional independence with mobility by performin. Supine to sit with min A.  2. Sit to supine with min A.   3. Rolling R and L with min A.   4. Sitting at edge of bed >5 minutes with min A.   5. PT will assess sit<>stand.    Pt progressing slightly towards goals today, sat up EOB 17 min., w/ max.A/occ tot A x 1, +1 SBA for safety.

## 2017-02-21 NOTE — PROGRESS NOTES
[facility] Hospital Medicine  Progress Note    Patient Name: Esteban Goins  MRN: [id number]  Patient Class: IP- Inpatient   Admission Date: 2/13/2017  Length of Stay: 7 days  Attending Physician: Cristy Cullen MD  Primary Care Provider: [facility]        Subjective:     Principal Problem:Acute ischemic left MCA stroke    HPI:  Mr. Goins is a 77 year old man who was found by his brother with aphasia and right hemiparesis, was brought to [facility] and found to have severe hypertension and evidence of a stroke.  He was transferred to [facility] for a higher level of care.  On initial evaluation the patient was nonverbal and unable to follow commands.  Right arm showed flaccid paralysis and right leg withdrew to painful stimuli.  Initial labs showed likely stage IV CKD with proteinuria and uncontrolled diabetes.      Hospital Course:  Patient was admitted to the ICU and was seen by the neurologist.  MRI/MRA showed an occlusion of the left superior division of a segment of the MCA resulting in a large left cerebral infarction involving the left parietal, temporal and frontal cortices.  Permissive hypertension was recommended for 24-48 hours.  He was unable to speak or swallow when evaluated by Speech and an NGT was placed.  Overnight he developed atrial fibrillation with a rapid ventricular response and was started on an amiodarone drip.  He transitioned to oral amiodarone and bolus tube feeding.  He was evaluated by PT and OT and inpatient rehab was recommended.  PEG tube placement will be 2/22 as patient was on Plavix on admission and this had to be held for PEG placement.                Interval History:  Patient more interactive, moving more and vocalizing.  Tolerating tube feeding.    Review of Systems   Unable to perform ROS: Patient nonverbal     Objective:     Vital Signs (Most Recent):  Temp: 97.1 °F (36.2 °C) (02/20/17 1512)  Pulse: 96  (02/20/17 1600)  Resp: (!) 24 (02/20/17 1512)  BP: 108/60 (02/20/17 1512)  SpO2: 95 % (02/20/17 1512) Vital Signs (24h Range):  Temp:  [97.1 °F (36.2 °C)-99.3 °F (37.4 °C)] 97.1 °F (36.2 °C)  Pulse:  [] 96  Resp:  [20-28] 24  SpO2:  [88 %-98 %] 95 %  BP: (108-148)/(59-82) 108/60     Weight: 76.9 kg (169 lb 8.5 oz)  Body mass index is 24.33 kg/(m^2).    Intake/Output Summary (Last 24 hours) at 02/20/17 1758  Last data filed at 02/20/17 1400   Gross per 24 hour   Intake             1690 ml   Output              550 ml   Net             1140 ml      Physical Exam   Constitutional: He appears well-developed and well-nourished.   HENT:   Head: Normocephalic and atraumatic.   Eyes: Conjunctivae are normal. Pupils are equal, round, and reactive to light.   Squints left eye; right eye more open.  Tracks with both eyes.   Neck: Normal range of motion. Neck supple. No thyromegaly present.   Cardiovascular:   Irregularly irregular rhythm with HR in the 60's.   Pulmonary/Chest: He has no wheezes. He has no rales.   Breathing unlabored, unable to take a deep breath on command.  Coarse upper airway noise.   Abdominal: Soft. Bowel sounds are normal. He exhibits no distension. There is no tenderness.   Musculoskeletal:   Left hand deformed with only the thumb and 3rd digit remaining, heavily scarred.     Neurological:   Paralyzed on the right.  Expressive and receptive aphasia.  Does not follow commands.   Skin: Skin is warm and dry.       Significant Labs: All pertinent labs within the past 24 hours have been reviewed.    Significant Imaging: I have reviewed all pertinent imaging results/findings within the past 24 hours.    Assessment/Plan:      * Acute ischemic left MCA stroke  - Patient found down by brother, presented outside thrombolysis window.  - MRI/MRA with large left MCA stroke.  - 2D echo with diastolic dysfunction  - Continue speech/PT/OT  - ASA/Plavix/statin, blood pressure control  - Inpatient rehab  consulted      Hemiparesis, right  - Due to left MCA stroke.  - Continue therapy  - IP Rehab recommended.      Oral phase dysphagia  - Speech following and recommend NPO status with tube feeding.  - Secretions evident due to inability to handle them   - GI to place PEG Wednesday after 7 days off Plavix - hopefully this will not be permanent and he can regain some function in rehab.    Aphasia  - Expressive and receptive.  - Patient is unable to speak but is more responsive and making eye contact.      HTN (hypertension), malignant  - Metoprolol increased to 50 mg tid but BP lower now and will decrease to twice daily and discontinue hydralazine.  - Lisinopril discontinued given worsening renal function and nifedipine added.  - Gradually improving.  - Will need Ace or ARB eventually.    Iron deficiency anemia  - Appears to have iron deficiency in addition to anemia of chronic kidney disease.  - Iron studies show low iron stores with normal TIBC      CKD (chronic kidney disease), stage III  - Chronic kidney disease stage III-IV with uncertain baseline.  - Patient on allopurinol for hyperuricemia  - Proteinuria with diabetes as contributor.  - Lisinopril held for increasing creatinine but will eventually need ACE or ARB for proteinuria.  - Increased free water intake without much improvement  - Nephrology following        Type 2 diabetes mellitus with diabetic chronic kidney disease  - Patient on glipizide at home - SSI while here and Levemir twice daily until bolus feeding is started.  - Increase long acting as needed  - HbA1c 9.7 and sister reports he was noncompliant with diabetes treatment.      Paroxysmal atrial fibrillation  - Patient in NSR on arrival but now in rate controlled atrial fibrillation.  - Cardiologist following and transitioning patient to oral amiodarone.  - Continue beta blocker.    Acute respiratory failure with hypoxia  - Patient has had some difficulty handling his secretions, but pulmonary  edema not evident.  - No evidence systolic heart failure although has some diastolic dysfunction.      Diabetic retinopathy associated with type 2 diabetes mellitus        VTE Risk Mitigation         Ordered     heparin (porcine) injection 5,000 Units  Every 8 hours     Route:  Subcutaneous        02/13/17 1421     Medium Risk of VTE  Once      02/13/17 1421     Place sequential compression device  Until discontinued      02/13/17 1421          Cristy Maher MD  Department of Hospital Medicine   [facility]

## 2017-02-21 NOTE — PROGRESS NOTES
Renal Progress Note    Admit Date: 2/13/2017   LOS: 8 days     SUBJECTIVE:     Discussed with treatment team.  Pt remains aphasic following massive CVA.  Renal fxn remains poor.      Scheduled Meds:   albuterol-ipratropium 2.5mg-0.5mg/3mL  3 mL Nebulization Q6H    allopurinol  200 mg Oral Daily    amiodarone  200 mg Oral QID (WM & HS)    aspirin  81 mg Oral Daily    atorvastatin  40 mg Oral Daily    calcitRIOL  0.25 mcg Oral Daily    famotidine (PF)  20 mg Intravenous BID    ferrous sulfate  325 mg Oral BID    heparin (porcine)  5,000 Units Subcutaneous Q8H    insulin detemir  20 Units Subcutaneous BID    metoprolol tartrate  50 mg Oral BID    nifedipine 30 MG ORAL TR24  60 mg Oral Daily    vitamin D  2,000 Units Oral Daily       OBJECTIVE:     Vital Signs Range (Last 24H):  Temp:  [97.1 °F (36.2 °C)-99.7 °F (37.6 °C)]   Pulse:  []   Resp:  [20-25]   BP: (108-145)/(59-73)   SpO2:  [88 %-98 %]     I & O (Last 24H):    Intake/Output Summary (Last 24 hours) at 02/21/17 1057  Last data filed at 02/21/17 0600   Gross per 24 hour   Intake             2500 ml   Output              550 ml   Net             1950 ml       Physical Exam:  Well developed, well nourished, non-verbal (Aphasic) but awake  Neck supple no LAD  Irr Irr HR in 60's No rubs +S4  Course BS upper AW .  Abd soft NTND +BS, obese, NGT noted.  LUE severely deformed with thumb and 3rd digit remaining. Healed scars present  RUE flaccid paralysis  No LE edema          Laboratory:  CBC:     Recent Labs  Lab 02/21/17  0536   WBC 13.62*   RBC 5.36   HGB 10.1*   HCT 32.4*      MCV 60*   MCH 18.8*   MCHC 31.2*     BMP:   Recent Labs  Lab 02/15/17  0421  02/21/17  0536   *  < > 236*     < > 138   K 4.3  < > 4.3   *  < > 102   CO2 9*  < > 24   BUN 25*  < > 112*   CREATININE 2.2*  < > 4.5*   CALCIUM 8.5*  < > 8.4*   MG 1.7  --   --    < > = values in this interval not displayed.    Lab Results   Component Value Date     URICACID 10.3 (H) 02/19/2017     ASSESSMENT/PLAN:        76 YO male with massive acute ischemic left MCA stroke and hypertensive emergency. 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          Worsening Non-Oliguric SONYA on CKD3/4- baseline unclear. He did undergo a dye load with the MRA on admission which might have induced a ADDI.  Was also on ACE and given few doses of lasix.  Proteinuria about 1.4 grams by ratio.  Suspect underlying CKD due to combo of hypertension and DM based on history (has +retinopathy). Serology neg thus far, US shows chronic MRDz.. Continue free water per NGT, monitor Ins and Outs closely.  No need for RRT yet.  Will try gently IVF's over next 24 hours.  Renally dose meds, avoid nephrotoxins, and monitor I/O's closely.        HTN: As now. No ACE, ARB, or diuretic.        Afib with RVR, new onset: Per Cards. On amiodarone.        Protein-Calorie malnutrition: PEG planned due to dysphagia tomorrow. NGT feeds for now with Diabeta Source and free water as noted above.       DM: Per primary.  + History of retinopathy so most likely underlying Diabetic nephropathy as well.        Hyperuricemia: change allopurinol to Uloric.       Secondary HPT and Vit D defic:  Started oral Vit D3 and Calcitriol      Anemia:  Iron deficient. Likely of CKD.  Started oral iron for now.  No need for procrit as of yet.        See above recs.  Discussed with treatment team.  Poor overall prognosis with severity of CVA and co-morbidities.   May need to consider DNR.

## 2017-02-21 NOTE — PT/OT/SLP PROGRESS
Physical Therapy  Treatment    Esteban Goins   MRN: [id number]   Admitting Diagnosis: Acute ischemic left MCA stroke    PT Received On: 02/21/17  PT Start Time: 1445     PT Stop Time: 1515    PT Total Time (min): 30 min       Billable Minutes:  Therapeutic Activity 15 and Therapeutic Exercise 15    Treatment Type: Treatment  PT/PTA: PTA     PTA Visit Number: 1       General Precautions: Standard, fall, NPO  Orthopedic Precautions: N/A   Braces:      Do you have any cultural, spiritual, Faith conflicts, given your current situation?: unable to assess; pt nonverbal    Subjective:  Communicated with ns prior to session.  Pt sleeping upon entering room, arousable though and eyes open for tx.     Pain Rating:  (pt moaning/groaning at times during tx session, cannot voice c/o's pain)                   Objective:   Patient found with: NG tube, oxygen, peripheral IV, telemetry    Functional Mobility:  Bed Mobility:   Rolling/Turning Right: Maximum assistance, Total assistance  Scooting/Bridging: Total Assistance (x 2 for supine scooting, tot A x 1 for scooting in sitting)  Supine to Sit: Total Assistance  Sit to Supine: Total Assistance, Maximum Assistance (pt lifting LLE a little into bed)    Transfers:       Gait:        Stairs:  n/a    Balance:   Static Sit: 0: Needs MAXIMAL assist to maintain sitting without back support  Dynamic Sit: 0: N/A  Static Stand: n/a  Dynamic stand: n/a    Therapeutic Activities and Exercises:  Perf'd BLE ex's in supine, AAROM/PROM w/ LLE, PROM w/ RLE x 10 reps ea. In all planes of motion, PNF patterns utilized. Pt t/f'd sup to sit (HOB up high) w/ tot A x 1, pt cued to assist w/ LUE, but not following command. Pt. Sat up ~17 min. EOB w/ max. A/occ. Tot A x 1, +1 SBA for safety (OT present), cueing for static sitting balance. See OT note as well. Pt. ret'd to supine w/ max./totA x 2, pt lifting LLE slightly during t/f. D x 2 slide up to HOB.    AM-PAC 6 CLICK MOBILITY  How much help from  another person does this patient currently need?   1 = Unable, Total/Dependent Assistance  2 = A lot, Maximum/Moderate Assistance  3 = A little, Minimum/Contact Guard/Supervision  4 = None, Modified [place]/Independent    Turning over in bed (including adjusting bedclothes, sheets and blankets)?: 1  Sitting down on and standing up from a chair with arms (e.g., wheelchair, bedside commode, etc.): 1  Moving from lying on back to sitting on the side of the bed?: 1  Moving to and from a bed to a chair (including a wheelchair)?: 1  Need to walk in hospital room?: 1  Climbing 3-5 steps with a railing?: 1  Total Score: 6    AM-PAC Raw Score CMS G-Code Modifier Level of Impairment Assistance   6 % Total / Unable   7 - 9 CM 80 - 100% Maximal Assist   10 - 14 CL 60 - 80% Moderate Assist   15 - 19 CK 40 - 60% Moderate Assist   20 - 22 CJ 20 - 40% Minimal Assist   23 CI 1-20% SBA / CGA   24 CH 0% Independent/ Mod I     Patient left supine with all lines intact and OT present.    Assessment:  Esteban Goins is a 77 y.o. male with a medical diagnosis of Acute ischemic left MCA stroke and presents with dec mobility, dec. Strength/endurance, though did inc. Sitting time today at EOB.    Rehab identified problem list/impairments: Rehab identified problem list/impairments: weakness, impaired endurance, impaired self care skills, impaired functional mobilty, impaired balance, impaired cognition, impaired coordination, abnormal tone, decreased safety awareness    Rehab potential is good.    Activity tolerance: Fair    Discharge recommendations: Discharge Facility/Level Of Care Needs: rehabilitation facility     Barriers to discharge: Barriers to Discharge: Inaccessible home environment, Decreased caregiver support    Equipment recommendations: Equipment Needed After Discharge:  (TBD)     GOALS:   Physical Therapy Goals        Problem: Physical Therapy Goal    Goal Priority Disciplines Outcome Goal Variances Interventions    Physical Therapy Goal     PT/OT, PT Ongoing (interventions implemented as appropriate)     Description:  Goals to be met by: 3/14/17     Patient will increase functional independence with mobility by performin. Supine to sit with min A.  2. Sit to supine with min A.   3. Rolling R and L with min A.   4. Sitting at edge of bed >5 minutes with min A.   5. PT will assess sit<>stand.                 PLAN:    Patient to be seen daily  to address the above listed problems via therapeutic activities, therapeutic exercises, neuromuscular re-education, gait training  Plan of Care expires: 17  Plan of Care reviewed with: patient         Linda Byrne, PTA  2017

## 2017-02-21 NOTE — PROGRESS NOTES
Progress Note  Hospital Medicine    Admit Date: 2/13/2017   LOS: 8 days     SUBJECTIVE:     Follow-up For:  Acute ischemic left MCA stroke    Interval history: Esteban Clay is a 77 y.o. male who presents with right hemiparesis and aphasia. He was apparently found by his brother in the morning and it was noted that he had signs of a stroke. EMS was called and he was brought to [facility]. CT of the head shows Small 5mm low density area in the left thalamus poss. Small lacunar infarct. Chronic small vessel ischemic changes are noted. On arrival the pt. Was severely hypertensive (200/119). He was transferred to our facility for higher level of care.      Interval events:  - MRI/MRA showed large left cerebral infarction of the temoral and frontal cortices.   - pt. Has dysphagia and remains NPO. PEG planned tomorrow.   - remains non verbal, not able to follow commands. He tracks with his eyes to some degree but does not seem to understand   - renal function declining.      Review of Systems:  Review of Systems   Unable to perform ROS: Medical condition         OBJECTIVE:     Vital Signs Range (Last 24H):  Temp:  [97.1 °F (36.2 °C)-99.7 °F (37.6 °C)]   Pulse:  []   Resp:  [20-25]   BP: (108-145)/(59-73)   SpO2:  [88 %-98 %]     I & O (Last 24H):  Intake/Output Summary (Last 24 hours) at 02/21/17 1152  Last data filed at 02/21/17 1020   Gross per 24 hour   Intake             3000 ml   Output              550 ml   Net             2450 ml       Physical Exam:  Physical Exam   Constitutional: He is well-developed, well-nourished, and in no distress.   HENT:   Head: Normocephalic.   Neck: Normal range of motion. No JVD present. No thyromegaly present.   Cardiovascular: Normal rate and regular rhythm.    No murmur heard.  Pulmonary/Chest: Effort normal. He has no wheezes.   ronchi bilaterally, tachypnea present.    Abdominal: Soft. He exhibits distension. He exhibits no mass. There is no tenderness.  There is no rebound.   Musculoskeletal: He exhibits no edema.   Neurological:   Non verbal, opens eyes on command. Flaccid paralysis of the right.    Skin: He is not diaphoretic. No erythema.       Medications:   albuterol-ipratropium 2.5mg-0.5mg/3mL  3 mL Nebulization Q6H    amiodarone  200 mg Oral QID (WM & HS)    aspirin  81 mg Oral Daily    atorvastatin  40 mg Oral Daily    calcitRIOL  0.25 mcg Oral Daily    famotidine (PF)  20 mg Intravenous BID    febuxostat  40 mg Per NG tube Daily    ferrous sulfate  325 mg Oral BID    heparin (porcine)  5,000 Units Subcutaneous Q8H    insulin detemir  20 Units Subcutaneous BID    metoprolol tartrate  50 mg Oral BID    nifedipine 30 MG ORAL TR24  60 mg Oral Daily    vitamin D  2,000 Units Oral Daily       Laboratory:   CBC:   Recent Labs  Lab 02/21/17  0536   WBC 13.62*   RBC 5.36   HGB 10.1*   HCT 32.4*      MCV 60*   MCH 18.8*   MCHC 31.2*       CMP:   Recent Labs  Lab 02/21/17  0536   *   CALCIUM 8.4*      K 4.3   CO2 24      *   CREATININE 4.5*       POCT Glucose   Date Value Ref Range Status   02/21/2017 232 (H) 70 - 110 mg/dL Final   02/21/2017 229 (H) 70 - 110 mg/dL Final   02/20/2017 203 (H) 70 - 110 mg/dL Final   02/20/2017 223 (H) 70 - 110 mg/dL Final   02/20/2017 250 (H) 70 - 110 mg/dL Final   02/20/2017 121 (H) 70 - 110 mg/dL Final   02/19/2017 171 (H) 70 - 110 mg/dL Final   02/19/2017 141 (H) 70 - 110 mg/dL Final   02/19/2017 166 (H) 70 - 110 mg/dL Final   02/19/2017 194 (H) 70 - 110 mg/dL Final   02/19/2017 252 (H) 70 - 110 mg/dL Final   02/18/2017 133 (H) 70 - 110 mg/dL Final   02/18/2017 355 (H) 70 - 110 mg/dL Final   02/18/2017 339 (H) 70 - 110 mg/dL Final   02/18/2017 366 (H) 70 - 110 mg/dL Final   02/18/2017 361 (H) 70 - 110 mg/dL Final   02/18/2017 444 (H) 70 - 110 mg/dL Final         Diagnostic Results:  Labs: Reviewed  X-Ray: Reviewed  MRI: Reviewed    ASSESSMENT/PLAN:     Active Hospital Problems     Diagnosis  POA    *Acute ischemic left MCA stroke [I63.512]  Yes    Diabetic retinopathy associated with type 2 diabetes mellitus [E11.319]  Yes    Acute respiratory failure with hypoxia [J96.01]  Yes    Paroxysmal atrial fibrillation [I48.0]  Yes    Hemiparesis, right [G81.91]  Yes    Oral phase dysphagia [R13.11]  Yes    Aphasia [R47.01]  Yes    HTN (hypertension), malignant [I10]  Yes    Iron deficiency anemia [D50.9]  Yes    CKD (chronic kidney disease), stage III [N18.3]  Yes    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     1: Acute ischemic left MCA stroke  - debilitating stroke with right flaccid paralysis and dysphagia  - no significant recovery of neurologic function since admission  - in need for PEG, planned for tomorrow.   - s/p permissive HTN, now normotensive  - PT/Ot recommend inpatient rehab.     2: DM2:   - poor control currently  - on detemir 20 qhs and ISS, adjust  - has been on compliant in the past per sister.   Lab Results   Component Value Date    HGBA1C 9.7 (H) 02/13/2017     3: HTN:   - controlled on Procardia 60 qd, Metoprolol 50 BID    4:  CKD III with ARF  - oliguria with worsening renal function and rising BUN  - poss. Pre-renal component  - discussed with SMITHA Graff nephrology  - IVF  - likely underlying DN/ HTN nephropathy  - is s/p contrast exposure and ACE/Lasix.   - has had positive balance.     5: A-fib with RVR  - has converted with Amiodarone iv and metoprolol  - has begun oral amiodarone and metorpolol  - has intermittent A-fib  - Anticoagulation not favored currently.     6: Prophylaxis  - on heparin.     I have spoken with the pt. Sister Ms. Lambert. We have discussed the current findings and prognosis.   She has told me the pt. Has always been an active man and being bed bound for the rest of his life would have no quality of life for  him.   They will discuss the current situation amongst the family and will let us know to what extent they would like to continue maximum care for the patient.

## 2017-02-21 NOTE — PT/OT/SLP PROGRESS
Occupational Therapy  Treatment (Initial overlap with PT treatment)    Esteban Goins   MRN: [id number]   Admitting Diagnosis: Acute ischemic left MCA stroke    OT Date of Treatment: 02/21/17   OT Start Time: 1406  OT Stop Time: 1428  OT Total Time (min): 22 min    Billable Minutes:  Therapeutic Exercise 10 and Neuromuscular Re-education 12    General Precautions: Standard, NPO, fall  Orthopedic Precautions: N/A  Braces: N/A    Do you have any cultural, spiritual, Caodaism conflicts, given your current situation?:  (pt nonverbal)    Subjective:    Pt found sitting EOB with PT at the start of treatment. Pt groaned during the session but was nonverbal.     Pain Rating:  (pt groaned during tx but unable to describe pain)   Pain Rating Post-Intervention:  (pt unable to verbalize pain)    Objective:  Patient found with: NG tube, oxygen, peripheral IV, telemetry     Functional Mobility:  Bed Mobility:  Sit to Supine: Total Assistance (pt attempted to move LLE toward the bed)    Transfers: None      Functional Ambulation: None    Self-Care  Total A UBD (don hospital gown in bed (pt not able to orient or participate in tasks)    Balance:   Static Sit: 0: Needs MAXIMAL assist to maintain sitting without back support  Dynamic Sit: POOR: N/A  Pt was able to inconsistently do some graded movement with somatosensory input with maximal assist.    Therapeutic Activities and Exercises:  Neuro Re-Ed: dynamic sitting balance with somatosensory cueing at EOB with max A to maintain, pt inconsistently and fleetingly responded to somatosensory cues  Cognitive Re-Ed: orientation and attention training, pt was able to inconsistently orient to face and a blue bottle, pt was briefly orienting to task with tactile and somatosensory cues, attempted to increase awareness of LUE, pt was unable to perform purposeful movements with LUE    AM-PAC 6 CLICK ADL   How much help from another person does this patient currently need?   1 = Unable,  Total/Dependent Assistance  2 = A lot, Maximum/Moderate Assistance  3 = A little, Minimum/Contact Guard/Supervision  4 = None, Modified [place]/Independent    Putting on and taking off regular lower body clothing? : 1  Bathing (including washing, rinsing, drying)?: 1  Toileting, which includes using toilet, bedpan, or urinal? : 1  Putting on and taking off regular upper body clothing?: 1  Taking care of personal grooming such as brushing teeth?: 1  Eating meals?: 1  Total Score: 6     AM-PAC Raw Score CMS G-Code Modifier Level of Impairment Assistance   6 % Total / Unable   7 - 9 CM 80 - 100% Maximal Assist   10 - 14 CL 60 - 80% Moderate Assist   15 - 19 CK 40 - 60% Moderate Assist   20 - 22 CJ 20 - 40% Minimal Assist   23 CI 1-20% SBA / CGA   24 CH 0% Independent/ Mod I     Patient left supine with all lines intact, call button in reach and PCT present    ASSESSMENT:  Esteban Goins is a 77 y.o. male with a medical diagnosis of Acute ischemic left MCA stroke and presents with poor attention, orientation, poor body scheme, memory, spatial and motor neglect of the R side of the body, and visual impairments. Neuro re-education performed including dynamic sitting balance with somatosensory cueing at EOB with Max A to maintain. Pt inconsistently and fleetingly responded to somatosensory cues throughout the session. Orientation and attention were addressed in supine position. Pt was able to inconsistently orient to face and a blue bottle. Pt was briefly orienting to task with tactile and somatosensory cues. Pt was unable to perform purposeful movements with LUE. Pt was unable to actively reach with LUE with somatosensory and situational demands.     Rehab identified problem list/impairments: Rehab identified problem list/impairments: weakness, impaired endurance, impaired self care skills, impaired functional mobilty, impaired balance, impaired cognition, abnormal tone, decreased upper extremity function,  decreased coordination, decreased safety awareness    Rehab potential is fair.    Activity tolerance: Fair    Discharge recommendations: Discharge Facility/Level Of Care Needs: rehabilitation facility     Barriers to discharge: Barriers to Discharge: Inaccessible home environment, Decreased caregiver support    Equipment recommendations:  (TBD)     GOALS:   Occupational Therapy Goals        Problem: Occupational Therapy Goal    Goal Priority Disciplines Outcome Interventions   Occupational Therapy Goal     OT, PT/OT Ongoing (interventions implemented as appropriate)    Description:  Goals to be met by: 2/24/2017    Patient will increase functional independence with ADLs by performing:    Grooming while bedlevel with Moderate Assistance.  Sitting at edge of bed x20  minutes with Stand-by Assistance.  Supine to sit with Maximum Assistance.  Stand pivot transfers assessment without distress.  Increased functional strength to 2/5 for RUE.  RUE Upper extremity exercise program  10 reps per handout, with assistance as needed.                Plan:  Patient to be seen 6 x/week to address the above listed problems via self-care/home management, therapeutic activities, therapeutic exercises, neuromuscular re-education, cognitive retraining  Plan of Care expires: 03/14/17  Plan of Care reviewed with: patient         LAKEISHA Morse  02/21/2017

## 2017-02-21 NOTE — PT/OT/SLP PROGRESS
Speech Language Pathology  Treatment    Esteban Goins   MRN: [id number]   Admitting Diagnosis: Acute ischemic left MCA stroke    Diet recommendations: Solid Diet Level: NPO  Liquid Diet Level: NPO Feed only when awake/alert, HOB to 90 degrees, Small bites/sips, Alternating bites/sips and 1 bite/sip at a time    SLP Treatment Date: 02/21/17  Speech Start Time: 1615     Speech Stop Time: 1624     Speech Total (min): 9 min       TREATMENT BILLABLE MINUTES:  Treatment Swallowing Dysfunction 9    Has the patient been evaluated by SLP for swallowing? : Yes  Keep patient NPO?: Yes   General Precautions: Standard, aspiration, fall, NPO  Current Respiratory Status: nasal cannula       Subjective:  Aphasic/awake/agitated  No evidence of pain, unable to fully determine 2/2 aphasia    Objective:     Pt asleep upon entry, but easily aroused with visual and verbal cues. Ice chip trials attempted; however pt did not accept initial trial and when second trial accepted no oral manipulation or swallow initiation noted. Oral care attempted; however pt reluctant to accept. Pt was unable to follow any simple commands provided max cues or answer basic yes no questions. Global aphasia evident throughout tx. Pt did not produce any intelligible utterances during tx. Continue POC at this time, all goals remain appropriate.     Assessment:  Esteban Goins is a 77 y.o. male with a medical diagnosis of Acute ischemic left MCA stroke and presents with Aphasia/dysphagia.    Discharge recommendations: Discharge Facility/Level Of Care Needs: rehabilitation facility     Goals:   SLP Goals        Problem: SLP Goal    Goal Priority Disciplines Outcome   SLP Goal     SLP           Problem: SLP Goal    Goal Priority Disciplines Outcome   SLP Goal     SLP    Description:  1.  The pt will follow 2-3 simple commands per session, given max visual/verbal cues, and structured environment to facilitate response.  2. The pt will participate in any simple automatic  speech task x1 per session, given max multimodal cues.  3. The pt will demonstrate yes/no response x3 per session via any modality, given max multimodal cues.               Plan:   Patient to be seen Therapy Frequency: 5 x/week   Plan of Care expires: 03/16/17  Plan of Care reviewed with: patient  SLP Follow-up?: Yes              Mery Downey CCC-SLP   Speech Language Pathologist  Pager [phone]  02/21/2017

## 2017-02-21 NOTE — NURSING
Marcial removed after completion of 24hr urine collection as per Dr. Cullen.  Pt tolerated well, no complications.  Will continue to monitor.

## 2017-02-22 NOTE — PROGRESS NOTES
[facility]  Gastroenterology  Progress Note    Patient Name: Esteban Goins  MRN: [id number]  Admission Date: 2/13/2017  Hospital Length of Stay: 9 days  Code Status: Full Code   Attending Provider: Christos Hernández MD  Consulting Provider: Aj Anders MD  Primary Care Physician: [facility]  Principal Problem: Acute ischemic left MCA stroke    Last Recorded LACE+ Scores     None        Subjective:     Interval History:     Now out of the ICU.  He just pulled out hos NG tube for the second time.       Review of Systems   Unable to perform ROS: Mental status change     Objective:     Vital Signs (Most Recent):  Temp: 97.7 °F (36.5 °C) (02/22/17 1132)  Pulse: (!) 119 (02/22/17 1400)  Resp: 20 (02/22/17 1310)  BP: (!) 139/93 (02/22/17 1132)  SpO2: 98 % (02/22/17 1310) Vital Signs (24h Range):  Temp:  [97.7 °F (36.5 °C)-98.5 °F (36.9 °C)] 97.7 °F (36.5 °C)  Pulse:  [] 119  Resp:  [18-22] 20  SpO2:  [95 %-99 %] 98 %  BP: (124-173)/(58-93) 139/93     Weight: 76.9 kg (169 lb 8.5 oz) (02/17/17 0500)  Body mass index is 24.33 kg/(m^2).      Intake/Output Summary (Last 24 hours) at 02/22/17 1514  Last data filed at 02/21/17 1800   Gross per 24 hour   Intake            566.5 ml   Output                0 ml   Net            566.5 ml       Lines/Drains/Airways     Drain                 NG/OG Tube 02/13/17 1455 nasogastric Right nostril 3 days         Urethral Catheter 02/13/17 1015 3 days          Peripheral Intravenous Line                 Peripheral IV - Single Lumen 02/14/17 1455 Right Antecubital 2 days                Physical Exam   Cardiovascular: Normal rate and regular rhythm.    Pulmonary/Chest: Breath sounds normal.   Abdominal: Soft. He exhibits no distension. There is no tenderness.       Significant Labs:  CBC:     Recent Labs  Lab 02/21/17  0536 02/22/17  0530   WBC 13.62* 14.27*   HGB 10.1* 9.4*   HCT 32.4* 30.6*    334     CMP:     Recent Labs  Lab  02/22/17  0530   *   CALCIUM 8.7      K 4.5   CO2 24      *   CREATININE 4.0*             Assessment/Plan:     Active Diagnoses:    Diagnosis Date Noted POA    PRINCIPAL PROBLEM:  Acute ischemic left MCA stroke [I63.512] 02/13/2017 Yes    Diabetic retinopathy associated with type 2 diabetes mellitus [E11.319] 02/20/2017 Yes    Acute respiratory failure with hypoxia [J96.01] 02/18/2017 Yes    Paroxysmal atrial fibrillation [I48.0] 02/14/2017 Yes    Hemiparesis, right [G81.91] 02/13/2017 Yes    Oral phase dysphagia [R13.11] 02/13/2017 Yes    Aphasia [R47.01] 02/13/2017 Yes    HTN (hypertension), malignant [I10] 02/13/2017 Yes    Iron deficiency anemia [D50.9] 02/13/2017 Yes    CKD (chronic kidney disease), stage III [N18.3] 02/13/2017 Yes    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  Seemingly poor overall prognosis, but family would like PEG placed per Dr. Hernández.    Now off clopidogrel for 5 days.    Plan PEG tomorrow.      Discussed with Dr. Hernández    Thank you for your consult.     Aj Anders MD  Gastroenterology  [facility]

## 2017-02-22 NOTE — PLAN OF CARE
Problem: Patient Care Overview  Goal: Plan of Care Review  Outcome: Ongoing (interventions implemented as appropriate)  No significant events overnight. Remains free from fall, injury, and skin breakdown. Incontinence care performed. VSS on RA throughout the night. Turned q2h. Tele maintained; all alarms active and audible. Tube feedings tolerated. Plan of care reviewed with patient and all questions answered. Bed low, locked w/ bed alarm on. Call light within reach. Purposeful rounding performed. Resting comfortably in bed, no other complaints at this time.

## 2017-02-22 NOTE — PT/OT/SLP PROGRESS
Physical Therapy  Treatment    Esteban Goins   MRN: [id number]   Admitting Diagnosis: Acute ischemic left MCA stroke    PT Received On: 02/22/17  PT Start Time: 1053     PT Stop Time: 1124    PT Total Time (min): 31 min       Billable Minutes:  Therapeutic Activity 11, Therapeutic Exercise 8 and Neuromuscular Re-education 12    Treatment Type: Treatment  PT/PTA: PT     PTA Visit Number: 0       General Precautions: Standard, aspiration, fall  Orthopedic Precautions: N/A   Braces: N/A    Do you have any cultural, spiritual, Hindu conflicts, given your current situation?: unable to assess; pt nonverbal    Subjective:  Communicated with nurse prior to session.  Pt groaning/moaning during session, at times agitated especially with rolling and oral care.   Pain Rating Post-Intervention:  (Groaning during session; unable to locate or quantify pain)    Objective:    Pt found supine in bed with HOB elevated.     Functional Mobility:  Bed Mobility:   Rolling/Turning to Left: Total assistance  Rolling/Turning Right: Maximum assistance  Supine to Sit: Total Assistance  Sit to Supine: Maximum Assistance (pt able to elevate L LE onto bed)      Balance:   Static Sit: Max A<>total A at trunk; pt demonstrated use of L UE for support with postural adjustments (between bed and rail)  Dynamic Sit: Total A for anterior/posterior and lateral weight shifting (x 10 reps each) again pt demonstrating change of positioning of L UE for support. Noted urinary incontinence while sitting at edge of bed. Nurse notified.      Therapeutic Activities and Exercises:  PT assisted patient in D1/D2 PNF R UE and R LE x 12 reps. Noted slightly increased tone compared to previous session.   Proprioceptive tactile input through axis of R UE and R LE in sitting. Active assist for R LE ankle pumps, long arc quads, hip internal/external rotation, and hip flexion in sitting (with total A at trunk).   Total assist for oral care and suctioning with [place]  following wet productive cough in supine. Pt groaned, agitated, and grasped [place] with L UE.     AM-PAC 6 CLICK MOBILITY  How much help from another person does this patient currently need?   1 = Unable, Total/Dependent Assistance  2 = A lot, Maximum/Moderate Assistance  3 = A little, Minimum/Contact Guard/Supervision  4 = None, Modified [place]/Independent    Turning over in bed (including adjusting bedclothes, sheets and blankets)?: 1  Sitting down on and standing up from a chair with arms (e.g., wheelchair, bedside commode, etc.): 1  Moving from lying on back to sitting on the side of the bed?: 1  Moving to and from a bed to a chair (including a wheelchair)?: 1  Need to walk in hospital room?: 1  Climbing 3-5 steps with a railing?: 1  Total Score: 6    AM-PAC Raw Score CMS G-Code Modifier Level of Impairment Assistance   6 % Total / Unable   7 - 9 CM 80 - 100% Maximal Assist   10 - 14 CL 60 - 80% Moderate Assist   15 - 19 CK 40 - 60% Moderate Assist   20 - 22 CJ 20 - 40% Minimal Assist   23 CI 1-20% SBA / CGA   24 CH 0% Independent/ Mod I     Patient left supine with all lines intact, call button in reach and nurse notified. L UE restrained.  Assessment:  Esteban Goins is a 77 y.o. male with a medical diagnosis of Acute ischemic left MCA stroke  Pt progressing with PT. He continues to required max<>total A for bed mobility and sitting balance with use of L UE for support in sitting. He attention to mobility and voice has improved. Pt would benefit from continued skilled PT to maximize independence and safety with functional mobility.      Rehab identified problem list/impairments: Rehab identified problem list/impairments: weakness, impaired self care skills, impaired functional mobilty, impaired balance, decreased lower extremity function, decreased upper extremity function, decreased ROM, impaired coordination, impaired cognition    Rehab potential is fair.    Activity tolerance: Good    Discharge  recommendations: Discharge Facility/Level Of Care Needs: rehabilitation facility     Barriers to discharge: Barriers to Discharge: Inaccessible home environment, Decreased caregiver support    Equipment recommendations:       GOALS:   Physical Therapy Goals        Problem: Physical Therapy Goal    Goal Priority Disciplines Outcome Goal Variances Interventions   Physical Therapy Goal     PT/OT, PT Ongoing (interventions implemented as appropriate)     Description:  Goals to be met by: 3/14/17     Patient will increase functional independence with mobility by performin. Supine to sit with min A.  2. Sit to supine with min A.   3. Rolling R and L with min A.   4. Sitting at edge of bed >5 minutes with min A.   5. PT will assess sit<>stand.                 PLAN:    Patient to be seen daily  to address the above listed problems via therapeutic activities, therapeutic exercises, neuromuscular re-education, gait training  Plan of Care expires: 17  Plan of Care reviewed with: patient         Nely Velasquez, PT  2017

## 2017-02-22 NOTE — PROGRESS NOTES
[facility]  Cardiology  Progress Note    Patient Name: Esteban Goins  MRN: [id number]  Admission Date: 2/13/2017  Hospital Length of Stay: 8 days  Code Status: Full Code   Attending Physician: Christos Heránndez MD   Primary Care Physician: [facility]  Expected Discharge Date:   Principal Problem:Acute ischemic left MCA stroke    Subjective:     Brief HPI:    78 yo man with hypertension. Presents after a cerebrovascular accident with right sided weakness on 2/13/2017. On 2/14/2017 he went into atrial fibrillation and was begun on amiodarone for rate control.    Hospital Course:     2/14/2017: Began amiodarone.    2/21/2017: Back into sinus.    Interval History:     Miserable.    Review of Systems   Unable to perform ROS: patient nonverbal     Objective:     Vital Signs (Most Recent):  Temp: 98.5 °F (36.9 °C) (02/21/17 1525)  Pulse: 76 (02/21/17 2000)  Resp: (!) 22 (02/21/17 1918)  BP: (!) 124/58 (02/21/17 1525)  SpO2: 95 % (02/21/17 1918) Vital Signs (24h Range):  Temp:  [98 °F (36.7 °C)-99.7 °F (37.6 °C)] 98.5 °F (36.9 °C)  Pulse:  [68-87] 76  Resp:  [20-22] 22  SpO2:  [92 %-98 %] 95 %  BP: (121-145)/(58-73) 124/58     Weight: 76.9 kg (169 lb 8.5 oz)  Body mass index is 24.33 kg/(m^2).    SpO2: 95 %  O2 Device (Oxygen Therapy): nasal cannula w/ humidification      Intake/Output Summary (Last 24 hours) at 02/21/17 2008  Last data filed at 02/21/17 1800   Gross per 24 hour   Intake           2816.5 ml   Output                0 ml   Net           2816.5 ml       Lines/Drains/Airways     Drain                 NG/OG Tube 02/13/17 1455 nasogastric Right nostril 3 days         Urethral Catheter 02/13/17 1015 3 days          Peripheral Intravenous Line                 Peripheral IV - Single Lumen 02/14/17 1455 Right Antecubital 2 days         Peripheral IV - Single Lumen 02/15/17 Right Forearm 1 day                Physical Exam   Constitutional: No distress.   Neck: No JVD  present. No thyromegaly present.   Cardiovascular: Normal rate, regular rhythm, S1 normal and S2 normal.  Exam reveals gallop. Exam reveals no S3.    Pulmonary/Chest: Effort normal. He has rhonchi.   Abdominal: Normal appearance. There is no tenderness.   Skin: Skin is warm and dry.     Current Medications:     albuterol-ipratropium 2.5mg-0.5mg/3mL  3 mL Nebulization Q6H    amiodarone  200 mg Oral QID (WM & HS)    aspirin  81 mg Oral Daily    atorvastatin  40 mg Oral Daily    calcitRIOL  0.25 mcg Oral Daily    famotidine (PF)  20 mg Intravenous BID    febuxostat  40 mg Per NG tube Daily    ferrous sulfate  325 mg Oral BID    heparin (porcine)  5,000 Units Subcutaneous Q8H    insulin detemir  25 Units Subcutaneous BID    metoprolol tartrate  50 mg Oral BID    nifedipine 30 MG ORAL TR24  60 mg Oral Daily    vitamin D  2,000 Units Oral Daily     Current Laboratory Results:    Recent Results (from the past 24 hour(s))   POCT glucose    Collection Time: 02/20/17 10:05 PM   Result Value Ref Range    POCT Glucose 203 (H) 70 - 110 mg/dL   POCT glucose    Collection Time: 02/21/17  1:25 AM   Result Value Ref Range    POCT Glucose 229 (H) 70 - 110 mg/dL   CBC auto differential    Collection Time: 02/21/17  5:36 AM   Result Value Ref Range    WBC 13.62 (H) 3.90 - 12.70 K/uL    RBC 5.36 4.60 - 6.20 M/uL    Hemoglobin 10.1 (L) 14.0 - 18.0 g/dL    Hematocrit 32.4 (L) 40.0 - 54.0 %    MCV 60 (L) 82 - 98 fL    MCH 18.8 (L) 27.0 - 31.0 pg    MCHC 31.2 (L) 32.0 - 36.0 %    RDW 14.7 (H) 11.5 - 14.5 %    Platelets 298 150 - 350 K/uL    MPV SEE COMMENT 9.2 - 12.9 fL    Gran # 8.7 (H) 1.8 - 7.7 K/uL    Lymph # 2.4 1.0 - 4.8 K/uL    Mono # 1.7 (H) 0.3 - 1.0 K/uL    Eos # 0.7 (H) 0.0 - 0.5 K/uL    Baso # 0.02 0.00 - 0.20 K/uL    Gran% 64.8 38.0 - 73.0 %    Lymph% 17.3 (L) 18.0 - 48.0 %    Mono% 12.4 4.0 - 15.0 %    Eosinophil% 5.4 0.0 - 8.0 %    Basophil% 0.1 0.0 - 1.9 %    Platelet Estimate Appears normal     Aniso Slight      Poik Slight     Hypo Occasional     Ovalocytes Occasional     Target Cells Occasional     Large/Giant Platelets Present     Fragmented Cells Occasional     Differential Method Automated    Basic metabolic panel    Collection Time: 02/21/17  5:36 AM   Result Value Ref Range    Sodium 138 136 - 145 mmol/L    Potassium 4.3 3.5 - 5.1 mmol/L    Chloride 102 95 - 110 mmol/L    CO2 24 23 - 29 mmol/L    Glucose 236 (H) 70 - 110 mg/dL    BUN, Bld 112 (H) 8 - 23 mg/dL    Creatinine 4.5 (H) 0.5 - 1.4 mg/dL    Calcium 8.4 (L) 8.7 - 10.5 mg/dL    Anion Gap 12 8 - 16 mmol/L    eGFR if African American 13 (A) >60 mL/min/1.73 m^2    eGFR if non African American 12 (A) >60 mL/min/1.73 m^2   POCT glucose    Collection Time: 02/21/17  5:58 AM   Result Value Ref Range    POCT Glucose 232 (H) 70 - 110 mg/dL   POCT glucose    Collection Time: 02/21/17  2:07 PM   Result Value Ref Range    POCT Glucose 292 (H) 70 - 110 mg/dL     Current Imaging Results:    Imaging Results         X-Ray Chest 1 View (Final result) Result time:  02/21/17 13:41:13    Final result by Kailee Justice MD (02/21/17 13:41:13)    Impression:        Nasogastric tube appear in stable position. Multiple overlying cardiac monitoring leads.    No apparent change in cardiopulmonary status. Mild cardiomegaly with small bilateral pleural effusions and bibasilar atelectasis. No new focal consolidation.         Electronically signed by: KAILEE JUSTICE MD  Date:     02/21/17  Time:    13:41     Narrative:    XR Chest    Procedure time: 02/21/17 12:52:47    Accession #: [id number]    CLINICAL INDICATION: 77 year old M with dyspnea     TECHNIQUE: Single frontal portable chest radiograph.    COMPARISON:  02/18/2017.    FINDINGS            US Retroperitoneal Complete (Kidney and (Final result) Result time:  02/20/17 01:05:29    Final result by Ernst George MD (02/20/17 01:05:29)    Impression:        Sonographic features suggestive of chronic medical renal disease. No  evidence of hydronephrosis.      Electronically signed by: RYAN ESCOBEDO  Date:     02/20/17  Time:    01:05     Narrative:    Time of Procedure: 02/19/17 19:41:31  Accession # [id number]    Reason for study: Acute kidney injury     Comparison: None    Technique: Routine renal ultrasound was performed.    Findings:   Please note examination is technically limited secondary to patient body habitus and inability to breath-hold.    The kidneys measure 11.0 cm on the right and 10.3 cm on the left. There is bilateral cortical thinning, decreased corticomedullary distinction, and increased cortical echogenicity. There is no evidence of hydronephrosis. There are small bilateral simple appearing renal cysts.Perfusion to the kidneys is decreased. Resistive indices are elevated and as follow: 0.80 on the right and 0.86 on the left. The urinary bladder is decompressed around a Marcial catheter.            X-Ray Chest AP Portable (Final result) Result time:  02/18/17 01:45:09    Final result by Syd Kingsley MD (02/18/17 01:45:09)    Impression:       Enteric tube tip below left hemidiaphragm in appropriate position.    Cardiomegaly with interval increased attenuation of the pulmonary parenchyma and associated stable small bilateral pleural effusions.  The findings are suggestive of probable worsening CHF.  Clinical correlation and followup are suggested.    Nonspecific lucency in the right cardiophrenic angle.                  Electronically signed by: SYD KINGSLEY MD  Date:     02/18/17  Time:    01:45     Narrative:    Exam: [id number]  02/18/17  01:06:46 [id number] ([facility]) : XR CHEST AP PORTABLE    Technique:    Single frontal chest x-ray.    Comparison:    02/16/2017.    Findings:      Enteric tube courses below the left hemidiaphragm with its tip in the right upper abdominal quadrant in the region of the gastric antrum.  Previous monitoring EKG leads have been removed.      The trachea is unremarkable.There is stable enlargement of  the cardiomediastinal silhouette.  There is no evidence of free air beneath the hemidiaphragms.  There is stable obscuration of both costophrenic angles.  There is no evidence of a pneumothorax.  There is nonspecific lucency in the right cardiophrenic angle.   There is increased attenuation of the pulmonary parenchyma.  There are degenerative changes in the osseous structures.            X-Ray Chest 1 View (Final result) Result time:  02/16/17 09:18:52    Final result by Jack Holly Jr., MD (02/16/17 09:18:52)    Narrative:    Chest single view compared to February 14.  NG tube directed toward the gastric antrum.  Increased opacification at the bases likely some pleural fluid left greater than right.  Heart remains enlarged.  No pneumothorax.    Impression NG tube in satisfactory position.      Electronically signed by: JACK HOLLY MD  Date:     02/16/17  Time:    09:18             X-Ray Chest AP Portable (Final result) Result time:  02/14/17 16:53:12    Final result by Anson Garcia DO (02/14/17 16:53:12)    Impression:        See Above       Electronically signed by: ANSON GARCIA DO  Date:     02/14/17  Time:    16:53     Narrative:    Single portable frontal view of the chest    Comparison: None    Results: Ill-defined right basilar opacity concerning for effusion with atelectasis. There is no large pneumothorax. Atherosclerotic aorta. Nasogastric tube identified with looping configuration the epigastric region may be in the gastric cardia or distal esophagus. Clinical correlation and advancement advised. No large lung consolidation.            MRI Brain Without Contrast (Final result) Result time:  02/13/17 21:01:31    Final result by Jose G Amor MD (02/13/17 21:01:31)    Impression:        Occlusion of the left superior division of the M2 segment of the MCA with resulting large left cerebral infarct involving the left parietal, temporal, and frontal cortices.      Electronically signed  by: JOSE G AMOR MD  Date:     02/13/17  Time:    21:01     Narrative:    MRI brain, MRA head,     02/13/17 19:50:00    Accession# [id number]      CLINICAL INDICATION: 77 year old M with stroke     TECHNIQUE: Multiplanar multisequence MR imaging of the brain was performed without intravenous contrast.  Examination performed in conjunction with a 3-D time-of-flight noncontrast MR angiogram of the intracranial vasculature.  Multiple MIP reconstructions were performed.    COMPARISON: No priors.    FINDINGS:  There is a large infarct of the left parietal lobe extending anteriorly to involve the very posterior sulci of the left frontal and temporal lobes. Otherwise, there are a few scattered T2 hyperintensities in the periventricular white matter. Possible remote infarct of the right centrum semi-ovale. The no intracranial blood products. No significant mass effect. No midline shift. Visualized portions of the calvarium and sinuses show no focal abnormality.    MRA:   Visualized portions of the carotid arteries and vertebral arteries are normal in course and caliber. Basilar artery is widely patent. Posterior circulation is intact. Bilateral posterior communicating arteries are intact.    There is mild irregular stenoses of the bilateral MCA. There is occlusion of the superior branch of the M2 segments of the MCA.            MRA Brain (Final result) Result time:  02/13/17 21:01:31    Final result by Jose G Amor MD (02/13/17 21:01:31)    Impression:        Occlusion of the left superior division of the M2 segment of the MCA with resulting large left cerebral infarct involving the left parietal, temporal, and frontal cortices.      Electronically signed by: JOSE G AMOR MD  Date:     02/13/17  Time:    21:01     Narrative:    MRI brain, MRA head,     02/13/17 19:50:00    Accession# [id number]      CLINICAL INDICATION: 77 year old M with stroke     TECHNIQUE: Multiplanar multisequence MR imaging of the brain was  performed without intravenous contrast.  Examination performed in conjunction with a 3-D time-of-flight noncontrast MR angiogram of the intracranial vasculature.  Multiple MIP reconstructions were performed.    COMPARISON: No priors.    FINDINGS:  There is a large infarct of the left parietal lobe extending anteriorly to involve the very posterior sulci of the left frontal and temporal lobes. Otherwise, there are a few scattered T2 hyperintensities in the periventricular white matter. Possible remote infarct of the right centrum semi-ovale. The no intracranial blood products. No significant mass effect. No midline shift. Visualized portions of the calvarium and sinuses show no focal abnormality.    MRA:   Visualized portions of the carotid arteries and vertebral arteries are normal in course and caliber. Basilar artery is widely patent. Posterior circulation is intact. Bilateral posterior communicating arteries are intact.    There is mild irregular stenoses of the bilateral MCA. There is occlusion of the superior branch of the M2 segments of the MCA.            X-Ray Abdomen AP 1 View (Final result) Result time:  02/13/17 17:39:18    Final result by Rosy Hicks MD (02/13/17 17:39:18)    Impression:     As above.      Electronically signed by: ROSY HICKS MD, MD  Date:     02/13/17  Time:    17:39     Narrative:    Comparison: Abdominal radiograph earlier same day at 1610 hrs.    Findings: Single AP portable semiupright view of the mid to upper abdomen which includes the lower chest. The lower abdomen and pelvis are not included in field-of-view. Enteric tube appears to have been retracted with port and tip projected over the medial left upper quadrant, likely within the proximal stomach. Otherwise, no significant interval change of the included lower chest and upper abdomen.            X-Ray Abdomen AP 1 View (Final result) Result time:  02/13/17 16:45:53    Final result by Anson Garcia DO (02/13/17  16:45:53)    Impression:     See Above .      Electronically signed by: ANSON GARCIA DO  Date:     02/13/17  Time:    16:45     Narrative:    Technique: Supine view of the upper abdomen    Comparison: 02/13/2017 at 1606    Results:Interval placement of a feeding tube with tip projected over the right upper abdomen in the region of the expected gastric pylorus/proximal duodenum. External monitors overlie the lower thorax and left mid abdomen. Continued few scattered gas-filled loops of bowel within the upper abdomen.            X-Ray Abdomen AP 1 View (Final result) Result time:  02/13/17 16:41:56    Final result by Anson Garcia DO (02/13/17 16:41:56)    Impression:     See Above .      Electronically signed by: ANSON GARCIA DO  Date:     02/13/17  Time:    16:41     Narrative:    Technique: Supine view the abdomen    Comparison: None    Results:Scattered gas-filled loops of bowel within the abdomen with gas and fecal material projected over the visualized colon. Overall nonspecific bowel gas pattern. No feeding tube projected over the visualized mediastinum or upper abdomen. Clinical correlation advised. Multiple external leads project over the lower thorax.              Assessment and Plan:     Problem List:    Active Diagnoses:    Diagnosis Date Noted POA    PRINCIPAL PROBLEM:  Acute ischemic left MCA stroke [I63.512] 02/13/2017 Yes    Diabetic retinopathy associated with type 2 diabetes mellitus [E11.319] 02/20/2017 Yes    Acute respiratory failure with hypoxia [J96.01] 02/18/2017 Yes    Paroxysmal atrial fibrillation [I48.0] 02/14/2017 Yes    Hemiparesis, right [G81.91] 02/13/2017 Yes    Oral phase dysphagia [R13.11] 02/13/2017 Yes    Aphasia [R47.01] 02/13/2017 Yes    HTN (hypertension), malignant [I10] 02/13/2017 Yes    Iron deficiency anemia [D50.9] 02/13/2017 Yes    CKD (chronic kidney disease), stage III [N18.3] 02/13/2017 Yes    Type 2 diabetes mellitus with diabetic chronic kidney disease  [E11.22] 02/13/2017 Yes      Problems Resolved During this Admission:    Diagnosis Date Noted Date Resolved POA     Assessment and Plan:    1. Atrial Fibrillation              2/14/2017: Diagnosed with paroxysmal atrial fibrillation.              [id number] (HA2S2V)              2/14/2017: Began amiodarone iv and metoprolol.              On amiodarone 200 mg Q6 and metoprolol 50 mg Q8.   2/21/2017: Into sinus rhythm.      2. Stroke Prevention              On aspirin 81 mg Q24.   Heparin 5,000 U sc Q8.              Would not favor anticoagulation at present.     3. Cerebrovascular Accident              Imaging tests reveal lacunar infarct.        VTE Risk Mitigation         Ordered     heparin (porcine) injection 5,000 Units  Every 8 hours     Route:  Subcutaneous        02/13/17 1421     Medium Risk of VTE  Once      02/13/17 1421     Place sequential compression device  Until discontinued      02/13/17 1421          Anticipated Disposition: Long Term Care    Discharge Needs: Not clear at the present time.    Nicanor Freitas MD  Cardiology  [facility]

## 2017-02-22 NOTE — PLAN OF CARE
Problem: Patient Care Overview  Goal: Plan of Care Review  Outcome: Ongoing (interventions implemented as appropriate)  Patient free of falls, injury, or skin breakdown during shift. Nonverbal indicators of pain absent. Patient a bit restless, pulled out two NG tubes today. Spoke with MD, oked to leave out since PEG tube being placed tomorrow morning. Holding tube feedings and meds. Afib on telemetry. Incontinence care provided prn. SCDs maintained. Turned q2h, positioned with wedge and pillows. Bed low and locked, side rails x 3, bed alarm on, call bell in reach. Patient resting comfortably in bed. Will continue to monitor

## 2017-02-22 NOTE — PT/OT/SLP PROGRESS
Occupational Therapy  Treatment    Esteban Goins   MRN: [id number]   Admitting Diagnosis: Acute ischemic left MCA stroke    OT Date of Treatment: 02/22/17   OT Start Time: 1143  OT Stop Time: 1213  OT Total Time (min): 30 min    Billable Minutes:  Neuromuscular Re-education 12 and Cognitive Retraining 18    General Precautions: Standard, fall, NPO  Orthopedic Precautions: N/A  Braces: N/A    Do you have any cultural, spiritual, Scientology conflicts, given your current situation?:  (pt nonverbal)    Subjective:  Pt found supine in bed at the beginning of the session. Pt grunted/groaned and briefly grimaced during treatment.     Pain Rating:  (pt unable to vocalize pain but some groaning and a slight grimace during the tx)    Objective:  Patient found with: NG tube, peripheral IV, oxygen     Functional Mobility:  Bed Mobility:  Rolling/Turning to Left: Total assistance  Rolling/Turning Right: Total assistance  Scooting/Bridging: Total Assistance, With assist of 2  Supine to Sit: Total Assistance, With assist of 2  Sit to Supine: Total Assistance, With assist of 2    Transfers: None    Functional Ambulation: None    Activities of Daily Living:    UE Dressing Level of Assistance: Total assistance (to don hospital gown)  Pt was unable to understand the task demands.     Balance:   Static Sit: fluctuating between mid-max assist with CGA to prevent falling      Therapeutic Activities and Exercises:  Dynamic sitting balance at EOB with min-max assist fluctuating to prevent falls, addressed orientation and attention, visual fixation and visual attention, assessed response to verbal, visual and somatosensory cues. Pt was not able to perform purposeful movement of the LUE. However, Pt demonstrates some spontaneous use of LUE for balance and support while seated EOB. Pt was more visually oriented today with a response to somatosensory and visual cues for orientation approximately 20-25% of the time.Responses remain inconsistent and  variable. He remains unable to follow verbal commands or purposefully engage in self-care tasks.         AM-PAC 6 CLICK ADL   How much help from another person does this patient currently need?   1 = Unable, Total/Dependent Assistance  2 = A lot, Maximum/Moderate Assistance  3 = A little, Minimum/Contact Guard/Supervision  4 = None, Modified [place]/Independent    Putting on and taking off regular lower body clothing? : 1  Bathing (including washing, rinsing, drying)?: 1  Toileting, which includes using toilet, bedpan, or urinal? : 1  Putting on and taking off regular upper body clothing?: 1  Taking care of personal grooming such as brushing teeth?: 1  Eating meals?: 1  Total Score: 6     AM-PAC Raw Score CMS G-Code Modifier Level of Impairment Assistance   6 % Total / Unable   7 - 9 CM 80 - 100% Maximal Assist   10 - 14 CL 60 - 80% Moderate Assist   15 - 19 CK 40 - 60% Moderate Assist   20 - 22 CJ 20 - 40% Minimal Assist   23 CI 1-20% SBA / CGA   24 CH 0% Independent/ Mod I     Patient left supine with all lines intact, call button in reach, bed alarm on and nurse notified    ASSESSMENT:  Esteban Goins is a 77 y.o. male with a medical diagnosis of Acute ischemic left MCA stroke and presents with R visual and spatial neglect, impaired cognition, balance, strength, inconsistent ability to follow commands.    Pt demonstrated visual and spatial neglect of the R side of the body. Pt had inconsistent and brief responses to visual, verbal and somatosensory input. He inconsistently responded to somatosensory cues. Pt was not able to perform purposeful movement of the LUE. However, Pt demonstrates some spontaneous use of LUE for balance and support while seated EOB. Pt required total A to roll bilaterally in the bed. Pt required total assist x2 to move from supine<>sit and bridge in bed. Pt sat EOB x14 min with greater tolerance than previous sessions.Pt demonstrated less agitated behaviors (jerking to the  right, posterior lean etc.) and was more attentive while sitting EOB as compared to yesterday's session. Pt continues to need OT to improve orientation, alertness, and overall function in mobility, self-care, and cognition.       Rehab identified problem list/impairments: Rehab identified problem list/impairments:  (Factors Affecting Function: R visual and spatial neglect, impaired cognition, balance, strength, inconsistent ability to follow commands)    Rehab potential is fair.    Activity tolerance: Good (improvement from previous session)    Discharge recommendations: Discharge Facility/Level Of Care Needs: rehabilitation facility     Barriers to discharge: Barriers to Discharge: Inaccessible home environment, Decreased caregiver support    Equipment recommendations:  (TBD)     GOALS:   Occupational Therapy Goals        Problem: Occupational Therapy Goal    Goal Priority Disciplines Outcome Interventions   Occupational Therapy Goal     OT, PT/OT Ongoing (interventions implemented as appropriate)    Description:  Goals to be met by: 2017    Patient will increase functional independence with ADLs by performin. Maximum Assist Supine-Sit EOB  2. Orients to visual and somatosensory quest for orientation-memory 50%  3. Mod A manual cuing during dynamic sitting balance training EOB without attempting to use LUE assist for support  4. Visual tracking to face and large object 30%                   Plan:  Patient to be seen 6 x/week to address the above listed problems via self-care/home management, therapeutic activities, therapeutic exercises, cognitive retraining, neuromuscular re-education  Plan of Care expires: 17  Plan of Care reviewed with: patient         Christianne Bass, SOGONZALEZ  2017

## 2017-02-22 NOTE — PROGRESS NOTES
Pt pulled out second NG tube. Charge nurse notified. MD and Dr. Anders present. OKed to leave tube out since PEG would be placed tomorrow. Will continue to monitor.

## 2017-02-22 NOTE — PLAN OF CARE
02/22/17 1140   Discharge Reassessment   Assessment Type Discharge Planning Reassessment   Can the patient answer the patient profile reliably? No, cognitively impaired   How does the patient rate their overall health at the present time? Poor   Describe the patient's ability to walk at the present time. Major restrictions/daily assistance from another person   How often would a person be available to care for the patient? Whenever needed   Number of comorbid conditions (as recorded on the chart) Five or more   During the past month, has the patient often been bothered by feeling down, depressed or hopeless? No   During the past month, has the patient often been bothered by little interest or pleasure in doing things? No   Provided patient/caregiver education on the expected discharge date and the discharge plan Yes   Discharge Plan A Rehab   Discharge Plan B Inpatient Hospice   Change in patient condition or support system No   Patient choice form signed by patient/caregiver N/A   Explained to the the patient/caregiver why the discharge planned changed: Yes   Involved the patient/caregiver in establishing a new discharge plan: Yes

## 2017-02-22 NOTE — PROGRESS NOTES
Renal Progress Note    Admit Date: 2/13/2017   LOS: 9 days     SUBJECTIVE:     Discussed with treatment team.  Pt remains aphasic following massive CVA.  Renal fxn somewhat improved with IVF's.  Awaiting family decision about overall plan.  RN staff replacing NGT now.  PEG tube on hold until family decides.     Scheduled Meds:   albuterol-ipratropium 2.5mg-0.5mg/3mL  3 mL Nebulization Q6H    amiodarone  200 mg Oral QID (WM & HS)    aspirin  81 mg Oral Daily    atorvastatin  40 mg Oral Daily    calcitRIOL  0.25 mcg Oral Daily    famotidine (PF)  20 mg Intravenous BID    febuxostat  40 mg Per NG tube Daily    ferrous sulfate  325 mg Oral BID    heparin (porcine)  5,000 Units Subcutaneous Q8H    insulin detemir  25 Units Subcutaneous BID    metoprolol tartrate  50 mg Oral BID    nifedipine 30 MG ORAL TR24  60 mg Oral Daily    vitamin D  2,000 Units Oral Daily       OBJECTIVE:     Vital Signs Range (Last 24H):  Temp:  [97.8 °F (36.6 °C)-98.5 °F (36.9 °C)]   Pulse:  []   Resp:  [18-22]   BP: (121-173)/(58-87)   SpO2:  [92 %-99 %]     I & O (Last 24H):    Intake/Output Summary (Last 24 hours) at 02/22/17 1033  Last data filed at 02/21/17 1800   Gross per 24 hour   Intake            816.5 ml   Output                0 ml   Net            816.5 ml       Physical Exam:  Well developed, (Aphasic) but awake  Neck supple no LAD  Irr Irr HR in 60's No rubs +S4  Course BS upper AW .  Abd soft NTND +BS, obese, NGT noted.  LUE severely deformed with thumb and 3rd digit remaining. Healed scars present  Rided flaccid paralysis  No LE edema          Laboratory:  CBC:     Recent Labs  Lab 02/22/17  0530   WBC 14.27*   RBC 5.01   HGB 9.4*   HCT 30.6*      MCV 61*   MCH 18.8*   MCHC 30.7*     BMP:     Recent Labs  Lab 02/22/17  0530   *      K 4.5      CO2 24   *   CREATININE 4.0*   CALCIUM 8.7       Lab Results   Component Value Date    URICACID 10.3 (H) 02/19/2017     ASSESSMENT/PLAN:         78 YO male with massive acute ischemic left MCA stroke and hypertensive emergency. 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          Minimally improving Non-Oliguric SONYA on CKD3/4- baseline unclear. He did undergo a dye load with the MRA on admission which might have induced a ADDI.  Was also on ACE and given few doses of lasix.  Proteinuria about 1.4 grams by ratio.  Suspect underlying CKD due to combo of hypertension and DM based on history (has +retinopathy). Serology neg thus far, US shows chronic MRDz.. Continue free water per NGT, monitor Ins and Outs closely.  No need for RRT yet.  Continue with IVF's for now.  Would not be an ideal candidate for RRT.  Renally dose meds, avoid nephrotoxins, and monitor I/O's closely.        HTN: As now. No ACE, ARB, or diuretic.        Afib with RVR, new onset: Per Cards. On amiodarone.        Protein-Calorie malnutrition: PEG planned due to dysphagia depending on family. NGT feeds for now with Diabeta Source and free water as noted above.       DM: Per primary.  + History of retinopathy so most likely underlying Diabetic nephropathy as well.        Hyperuricemia: changed allopurinol to Uloric.       Secondary HPT and Vit D defic:  Started oral Vit D3 and Calcitriol      Anemia:  Iron deficient. Likely of CKD.  Started oral iron for now.          See above recs.  Discussed with treatment team.  Poor overall prognosis with severity of CVA and co-morbidities.   Need to consider DNR/Hospice

## 2017-02-22 NOTE — PLAN OF CARE
Problem: Patient Care Overview  Goal: Plan of Care Review  Outcome: Ongoing (interventions implemented as appropriate)  Patient free of falls, injury, or skin breakdown during shift. Nonverbal indicators of pain absent. Patient a bit restless, attempts to grab NGT at times; patient educated. Afib on telemetry. Tube feeds as ordered; NGT without complication. Incontinence care provided prn. SCDs maintained. Turned q2h, positioned with wedge and pillows. Bed low and locked, side rails x 3, bed alarm on, call bell in reach. Patient resting comfortably in bed. Will continue to monitor.

## 2017-02-22 NOTE — PHYSICIAN QUERY
PT Name: Esteban Goins  MR #: [id number]     Physician Query Form - Documentation Clarification    Reviewer Sulema Mccauley RN, [email or web address]    This form is a permanent document in the medical record.     Query Date: February 22, 2017  By submitting this query, we are merely seeking further clarification of documentation to reflect the severity of illness of your patient. Please utilize your independent clinical judgment when addressing the question(s) below.    (The Medical record reflects the following:)      Supporting Clinical Findings Location in Medical Record   78 YO male with massive acute ischemic left MCA stroke and hypertensive emergency. 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    Protein-Calorie malnutrition: PEG planned due to dysphagia depending on family. NGT feeds for now with Diabeta Source and free water as noted above. 2/22-Nephro PN     If pt remains on continuous feeds rec increasing rate to 55ml/hr to better meet needs (noted pt has lost 6% wt since admission). 100ml water flush 5 times day. This rate will provide 1584kcal (95%EEN), 79g pro (11%EPN) , 132g CHO, 1580ml water.        2/16-RD POC                                                                          Doctor, Please specify diagnosis or diagnoses associated with above clinical findings.    Please provide severity of Protein Calorie Malnutrition. Thank you.    Physician Use Only     [   ] Mild PCM     [  x ] Moderate PCM     [   ] Severe PCM     [   ] Other clarification (please specify)_____________________                                                                                                          [   ] Unable to determine

## 2017-02-22 NOTE — PLAN OF CARE
Patient Name: Esteban Goins  MRN: [id number]  Patient Class: IP- Inpatient   Admission Date: 2/13/2017  Length of Stay:  9 days  Attending Physician: Cristy Cullen MD/Christos Hernández MD  Primary Care Provider: [facility] [place]         Discharge Planning:  Chart reviewed  Care plan discussed  Mr Goins is assigned room 315  PCG for Mr Goins is his sister, Petra, ([phone]).      Discussed care plan with treatment team  Discussed care plan with the attending Dr Hernández  Current dispo -pending (rehab vs hospice)- PEG placement-remains pending. Family is scheduled to make a decision today or tomorrow.  Case management to follow with you  Consults following are: neurology, PT, OT, ST, case mgt, GI, cardiology.

## 2017-02-22 NOTE — PROGRESS NOTES
Progress Note  Hospital Medicine    Admit Date: 2/13/2017   LOS: 9 days     SUBJECTIVE:     Follow-up For:  Acute ischemic left MCA stroke    Interval history: Esteban Clay is a 77 y.o. male who presents with right hemiparesis and aphasia. He was apparently found by his brother in the morning and it was noted that he had signs of a stroke. EMS was called and he was brought to [facility]. CT of the head shows Small 5mm low density area in the left thalamus poss. Small lacunar infarct. Chronic small vessel ischemic changes are noted. On arrival the pt. Was severely hypertensive (200/119). He was transferred to our facility for higher level of care.      Interval events:  - MRI/MRA showed large left cerebral infarction of the temoral and frontal cortices.   - pt. Has dysphagia and remains NPO. PEG planned.  - remains non verbal, not able to follow commands. He tracks with his eyes to some degree but does not seem to understand   - has pulled out NGT and needed re-insertion.   - spoke with family    Review of Systems:  Review of Systems   Unable to perform ROS: Medical condition     OBJECTIVE:     Vital Signs Range (Last 24H):  Temp:  [97.7 °F (36.5 °C)-98.5 °F (36.9 °C)]   Pulse:  []   Resp:  [18-22]   BP: (124-173)/(58-93)   SpO2:  [95 %-99 %]     I & O (Last 24H):    Intake/Output Summary (Last 24 hours) at 02/22/17 1347  Last data filed at 02/21/17 1800   Gross per 24 hour   Intake            816.5 ml   Output                0 ml   Net            816.5 ml       Physical Exam:  Physical Exam   Constitutional: He is well-developed, well-nourished, and in no distress.   HENT:   Head: Normocephalic.   Neck: Normal range of motion. No JVD present. No thyromegaly present.   Cardiovascular: Normal rate and regular rhythm.    No murmur heard.  Pulmonary/Chest: Effort normal. He has no wheezes.   ronchi bilaterally, tachypnea present.    Abdominal: Soft. He exhibits distension. He exhibits no mass.  There is no tenderness. There is no rebound.   Musculoskeletal: He exhibits no edema.   Neurological:   Non verbal, opens eyes on command. Flaccid paralysis of the right.    Skin: He is not diaphoretic. No erythema.       Medications:   albuterol-ipratropium 2.5mg-0.5mg/3mL  3 mL Nebulization Q6H    amiodarone  200 mg Oral QID (WM & HS)    aspirin  81 mg Oral Daily    atorvastatin  40 mg Oral Daily    calcitRIOL  0.25 mcg Oral Daily    famotidine (PF)  20 mg Intravenous BID    febuxostat  40 mg Per NG tube Daily    ferrous sulfate  325 mg Oral BID    heparin (porcine)  5,000 Units Subcutaneous Q8H    insulin detemir  25 Units Subcutaneous BID    metoprolol tartrate  50 mg Oral BID    nifedipine 30 MG ORAL TR24  60 mg Oral Daily    vitamin D  2,000 Units Oral Daily       Laboratory:   CBC:     Recent Labs  Lab 02/22/17  0530   WBC 14.27*   RBC 5.01   HGB 9.4*   HCT 30.6*      MCV 61*   MCH 18.8*   MCHC 30.7*     CMP:     Recent Labs  Lab 02/22/17  0530   *   CALCIUM 8.7      K 4.5   CO2 24      *   CREATININE 4.0*       POCT Glucose   Date Value Ref Range Status   02/22/2017 136 (H) 70 - 110 mg/dL Final   02/21/2017 282 (H) 70 - 110 mg/dL Final   02/21/2017 302 (H) 70 - 110 mg/dL Final   02/21/2017 292 (H) 70 - 110 mg/dL Final   02/21/2017 232 (H) 70 - 110 mg/dL Final   02/21/2017 229 (H) 70 - 110 mg/dL Final   02/20/2017 203 (H) 70 - 110 mg/dL Final   02/20/2017 223 (H) 70 - 110 mg/dL Final   02/20/2017 250 (H) 70 - 110 mg/dL Final   02/20/2017 121 (H) 70 - 110 mg/dL Final   02/19/2017 171 (H) 70 - 110 mg/dL Final   02/19/2017 141 (H) 70 - 110 mg/dL Final         Diagnostic Results:  Labs: Reviewed  X-Ray: Reviewed  MRI: Reviewed    ASSESSMENT/PLAN:     Active Hospital Problems    Diagnosis  POA    *Acute ischemic left MCA stroke [I63.512]  Yes    Diabetic retinopathy associated with type 2 diabetes mellitus [E11.319]  Yes    Acute respiratory failure with hypoxia  [J96.01]  Yes    Paroxysmal atrial fibrillation [I48.0]  Yes    Hemiparesis, right [G81.91]  Yes    Oral phase dysphagia [R13.11]  Yes    Aphasia [R47.01]  Yes    HTN (hypertension), malignant [I10]  Yes    Iron deficiency anemia [D50.9]  Yes    CKD (chronic kidney disease), stage III [N18.3]  Yes    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     1: Acute ischemic left MCA stroke  - debilitating stroke with right flaccid paralysis and dysphagia  - no significant recovery of neurologic function since admission  - in need for PEG, planned for tomorrow. NGT until then  - s/p permissive HTN, now normotensive    2: DM2:   - poor control, improved  - on detemir 20 qhs and ISS, adjust  - has been non compliant in the past per sister.   Lab Results   Component Value Date    HGBA1C 9.7 (H) 02/13/2017     3: HTN:   - controlled on Procardia 60 qd, Metoprolol 50 BID    4:  CKD III with ARF  - poss. Pre-renal component  - IVF  - likely underlying DN/ HTN nephropathy  - is s/p contrast exposure and ACE/Lasix.   - has had positive balance.   - some improvement.     5: A-fib with RVR  - has converted with Amiodarone iv and metoprolol  - has begun oral amiodarone and metorpolol  - has intermittent A-fib  - Anticoagulation not favored currently.     6: Prophylaxis  - on heparin.     Discussed with sister. PEG wanted. Will meet with family on Friday to discuss further plans of care.

## 2017-02-22 NOTE — PLAN OF CARE
Problem: Patient Care Overview  Goal: Plan of Care Review  Outcome: Ongoing (interventions implemented as appropriate)  Clearing secretions onhis own well enough that I was ble to avoid NT sx. Bipap not tolerated.

## 2017-02-22 NOTE — PROGRESS NOTES
While attempting morning meds, NG tube was removed by pt. Call MD and he ordered a new one be placed. NG tube placed with charge nurse, x-ray verified placement. MD also notified of a fib on tele and called to confirm whether to administer tube feeding for 10am since there was plans for PEG tube. Will continue to monitor.

## 2017-02-22 NOTE — PLAN OF CARE
Problem: Occupational Therapy Goal  Goal: Occupational Therapy Goal  Goals to be met by: 2017    Patient will increase functional independence with ADLs by performin. Maximum Assist Supine-Sit EOB  2. Orients to visual and somatosensory quest for orientation-memory 50%  3. Mod A manual cuing during dynamic sitting balance training EOB without attempting to use LUE assist for support  4. Visual tracking to face and large object 30%     Outcome: Ongoing (interventions implemented as appropriate)  Pt found supine in bed with HOB slightly elevated. Pt demonstrated visual and spatial neglect of the R side of the body. Pt had inconsistent and brief responses to visual, verbal and somatosensory cues. Pt was not able to perform purposeful movement of the LUE. Pt required total A to roll bilaterally in the bed. Pt required total assist x2 to move from supine<>sit and bridge in bed. Pt sat EOB x14 min with greater tolerance than previous sessions. Pt demonstrated less agitated behaviors (jerking to the right, posterior lean etc.) and was more attentive while sitting EOB than previous sessions.      Christianne Bass, YVROSET  17

## 2017-02-22 NOTE — PLAN OF CARE
Problem: Physical Therapy Goal  Goal: Physical Therapy Goal  Goals to be met by: 3/14/17     Patient will increase functional independence with mobility by performin. Supine to sit with min A.  2. Sit to supine with min A.   3. Rolling R and L with min A.   4. Sitting at edge of bed >5 minutes with min A.   5. PT will assess sit<>stand.    Outcome: Ongoing (interventions implemented as appropriate)  Pt progressing with PT. Continues to required max<>total A for bed mobility and sitting balance with use of L UE for support in sitting. Will continue to follow and progress as tolerated. Please see progress note for detailed plan of care and recommendations.

## 2017-02-23 NOTE — ANESTHESIA PREPROCEDURE EVALUATION
02/23/2017  Esteban Goins is a 77 y.o., male.    [facility] Anesthesia Evaluation    I have reviewed the Patient Summary Reports.    I have reviewed the Nursing Notes.   I have reviewed the Medications.     Review of Systems  Cardiovascular:   Exercise tolerance: poor Hypertension, poorly controlled Dysrhythmias    Pulmonary:   Acute respiratory failure with hypoxia   Renal/:   Chronic Renal Disease, CRI    Neurological:   CVA Acute ischemic CVA   Endocrine:   Diabetes, poorly controlled, type 2        Physical Exam  General:  Well nourished            Mental Status:  Mental Status Findings:  Lethargic         Anesthesia Plan  Type of Anesthesia, risks & benefits discussed:  Anesthesia Type:  general  Patient's Preference:   Intra-op Monitoring Plan:   Intra-op Monitoring Plan Comments:   Post Op Pain Control Plan:   Post Op Pain Control Plan Comments:   Induction:   IV  Beta Blocker:         Informed Consent: Patient representative understands risks and agrees with Anesthesia plan.  Questions answered. Anesthesia consent signed with patient representative.  ASA Score: 4     Day of Surgery Review of History & Physical:    H&P update referred to the surgeon.     Anesthesia Plan Notes: Patient moaning. arousable but non communicative.        Ready For Surgery From Anesthesia Perspective.

## 2017-02-23 NOTE — PLAN OF CARE
Problem: Occupational Therapy Goal  Goal: Occupational Therapy Goal  Goals to be met by: 2017    Patient will increase functional independence with ADLs by performin. Maximum Assist Supine-Sit EOB  2. Orients to visual and somatosensory quest for orientation-memory 50%  3. Mod A manual cuing during dynamic sitting balance training EOB without attempting to use LUE assist for support  4. Visual tracking to face and large object 30%     Outcome: Ongoing (interventions implemented as appropriate)  Pt found supine in bed. Pt required total A x2 for bilateral rolling, bridging in bed and supine<>sit. Pt tolerated sitting EOB x10 min. Facilitated movement for  R UE reach and LE leg extension (reponses inconsistent)  by using compression and resistance with manual and verbal cueing (inconsistent active responses). He had brief inconsist responces to verbal and somatosensory cues. He had good visual regard of therapist face at the beginning of the session but was less response to visual cues (visual orientation and eye tracking)  this session in comparison to yesterday. Addressed sitting balance with intermittent postural displacement. Pt showed no signs of agitation but was less responsive to cues than prior session.      Christianne Bass, SOT  2017

## 2017-02-23 NOTE — ANESTHESIA POSTPROCEDURE EVALUATION
"Anesthesia Post Evaluation    Patient: Esteban Poolari    Procedure(s) Performed: Procedure(s) (LRB):  ESOPHAGOGASTRODUODENOSCOPY (EGD) with PEG placement (N/A)    Final Anesthesia Type: general  Patient location during evaluation: PACU  Patient participation: Yes- Able to Participate  Level of consciousness: awake and alert  Post-procedure vital signs: reviewed and stable  Pain management: adequate  Airway patency: patent  PONV status at discharge: No PONV  Anesthetic complications: no      Cardiovascular status: blood pressure returned to baseline  Respiratory status: unassisted  Hydration status: euvolemic  Follow-up not needed.        Visit Vitals    /65    Pulse 87    Temp 36.2 °C (97.1 °F) (Axillary)    Resp 20    Ht 5' 10" (1.778 m)    Wt 76.9 kg (169 lb 8.5 oz)    SpO2 (!) 94%    BMI 24.33 kg/m2       Pain/Chelsea Score: Pain Assessment Performed: Yes (2/23/2017  8:15 AM)  Presence of Pain: non-verbal indicators present (2/23/2017  8:15 AM)  Pain Rating Prior to Med Admin: 0 (2/23/2017  8:21 AM)  Chelsea Score: 7 (2/23/2017  8:15 AM)      "

## 2017-02-23 NOTE — PROGRESS NOTES
EGD with PEG placed    EGD unremarkable.  PEG placed.    May use PEG for meds and water flushes only today.  Start feeds in the morning.    Discussed with pt's sister Petra by phone.

## 2017-02-23 NOTE — PROGRESS NOTES
[facility]  Cardiology  Progress Note    Patient Name: Esteban Goins  MRN: [id number]  Admission Date: 2/13/2017  Hospital Length of Stay: 9 days  Code Status: Full Code   Attending Physician: Christos Hernández MD   Primary Care Physician: [facility]  Expected Discharge Date:   Principal Problem:Acute ischemic left MCA stroke    Subjective:     Brief HPI:    78 yo man with hypertension. Presents after a cerebrovascular accident with right sided weakness on 2/13/2017. On 2/14/2017 he went into atrial fibrillation and was begun on amiodarone for rate control.    Hospital Course:     2/14/2017: Began amiodarone.    2/21/2017: Back into sinus.    2/22/2017: Back in atrial fibrillation.    Interval History:     Miserable.    Review of Systems   Unable to perform ROS: patient nonverbal     Objective:     Vital Signs (Most Recent):  Temp: 98.8 °F (37.1 °C) (02/22/17 1905)  Pulse: (!) 117 (02/22/17 1905)  Resp: 20 (02/22/17 1905)  BP: (!) 156/69 (02/22/17 1905)  SpO2: (!) 93 % (02/22/17 1905) Vital Signs (24h Range):  Temp:  [97.7 °F (36.5 °C)-98.8 °F (37.1 °C)] 98.8 °F (37.1 °C)  Pulse:  [] 117  Resp:  [18-22] 20  SpO2:  [92 %-99 %] 93 %  BP: (132-173)/(69-93) 156/69     Weight: 76.9 kg (169 lb 8.5 oz)  Body mass index is 24.33 kg/(m^2).    SpO2: (!) 93 %  O2 Device (Oxygen Therapy): nasal cannula      Intake/Output Summary (Last 24 hours) at 02/22/17 2002  Last data filed at 02/22/17 1800   Gross per 24 hour   Intake           1186.5 ml   Output                0 ml   Net           1186.5 ml       Lines/Drains/Airways     Drain                 NG/OG Tube 02/13/17 1455 nasogastric Right nostril 3 days         Urethral Catheter 02/13/17 1015 3 days          Peripheral Intravenous Line                 Peripheral IV - Single Lumen 02/14/17 1455 Right Antecubital 2 days         Peripheral IV - Single Lumen 02/15/17 Right Forearm 1 day                Physical Exam    Constitutional: No distress.   Neck: No JVD present. No thyromegaly present.   Cardiovascular: S1 normal and S2 normal.  An irregularly irregular rhythm present. Tachycardia present.  Exam reveals gallop. Exam reveals no S3.    Pulmonary/Chest: Effort normal. He has rhonchi.   Abdominal: Normal appearance. There is no tenderness.   Skin: Skin is warm and dry.     Current Medications:     albuterol-ipratropium 2.5mg-0.5mg/3mL  3 mL Nebulization Q6H    amiodarone  200 mg Oral QID (WM & HS)    aspirin  81 mg Oral Daily    atorvastatin  40 mg Oral Daily    calcitRIOL  0.25 mcg Oral Daily    famotidine (PF)  20 mg Intravenous BID    febuxostat  40 mg Per NG tube Daily    ferrous sulfate  325 mg Oral BID    insulin detemir  25 Units Subcutaneous BID    metoprolol tartrate  50 mg Oral BID    nifedipine 30 MG ORAL TR24  60 mg Oral Daily    vitamin D  2,000 Units Oral Daily     Current Laboratory Results:    Recent Results (from the past 24 hour(s))   POCT glucose    Collection Time: 02/21/17 11:46 PM   Result Value Ref Range    POCT Glucose 282 (H) 70 - 110 mg/dL   POCT glucose    Collection Time: 02/22/17  5:26 AM   Result Value Ref Range    POCT Glucose 136 (H) 70 - 110 mg/dL   CBC auto differential    Collection Time: 02/22/17  5:30 AM   Result Value Ref Range    WBC 14.27 (H) 3.90 - 12.70 K/uL    RBC 5.01 4.60 - 6.20 M/uL    Hemoglobin 9.4 (L) 14.0 - 18.0 g/dL    Hematocrit 30.6 (L) 40.0 - 54.0 %    MCV 61 (L) 82 - 98 fL    MCH 18.8 (L) 27.0 - 31.0 pg    MCHC 30.7 (L) 32.0 - 36.0 %    RDW 14.7 (H) 11.5 - 14.5 %    Platelets 334 150 - 350 K/uL    MPV SEE COMMENT 9.2 - 12.9 fL    Gran # 9.4 (H) 1.8 - 7.7 K/uL    Lymph # 2.3 1.0 - 4.8 K/uL    Mono # 1.4 (H) 0.3 - 1.0 K/uL    Eos # 1.1 (H) 0.0 - 0.5 K/uL    Baso # 0.03 0.00 - 0.20 K/uL    Gran% 66.6 38.0 - 73.0 %    Lymph% 16.2 (L) 18.0 - 48.0 %    Mono% 9.6 4.0 - 15.0 %    Eosinophil% 7.4 0.0 - 8.0 %    Basophil% 0.2 0.0 - 1.9 %    Platelet Estimate Appears  normal     Aniso Slight     Poik Slight     Poly Occasional     Hypo Occasional     Large/Giant Platelets Present     Fragmented Cells Occasional     Differential Method Automated    Basic metabolic panel    Collection Time: 02/22/17  5:30 AM   Result Value Ref Range    Sodium 141 136 - 145 mmol/L    Potassium 4.5 3.5 - 5.1 mmol/L    Chloride 107 95 - 110 mmol/L    CO2 24 23 - 29 mmol/L    Glucose 121 (H) 70 - 110 mg/dL    BUN, Bld 102 (H) 8 - 23 mg/dL    Creatinine 4.0 (H) 0.5 - 1.4 mg/dL    Calcium 8.7 8.7 - 10.5 mg/dL    Anion Gap 10 8 - 16 mmol/L    eGFR if African American 15 (A) >60 mL/min/1.73 m^2    eGFR if non African American 13 (A) >60 mL/min/1.73 m^2   POCT glucose    Collection Time: 02/22/17  1:53 PM   Result Value Ref Range    POCT Glucose 219 (H) 70 - 110 mg/dL   POCT glucose    Collection Time: 02/22/17  5:56 PM   Result Value Ref Range    POCT Glucose 187 (H) 70 - 110 mg/dL     Current Imaging Results:    Imaging Results         X-Ray Chest 1 View (Final result) Result time:  02/22/17 11:19:45    Final result by Maryam Mcqueen MD (02/22/17 11:19:45)    Impression:      Enteric tube tip overlying the gastric bubble with sidehole poorly seen      Electronically signed by: MARYAM MCQUEEN  Date:     02/22/17  Time:    11:19     Narrative:    CLINICAL HISTORY:  NG tube placement    TECHNIQUE: Single view portable frontal radiograph of the chest    COMPARISON: Chest radiograph 2/21/17      FINDINGS:  Support devices: Enteric tube tip overlies the gastric bubble.  Side hole is not able to be visualized due to attenuation from overlying soft tissues.    Chest: Borderline enlargement of the cardiac silhouette is unchanged, exaggerated by portable technique and body habitus.  Lungs are well aerated and clear.  No pleural effusion or pneumothorax.    Upper Abdomen: Normal.    Other: N/A.            X-Ray Chest 1 View (Final result) Result time:  02/21/17 13:41:13    Final result by Maykel Diamond MD (02/21/17  13:41:13)    Impression:        Nasogastric tube appear in stable position. Multiple overlying cardiac monitoring leads.    No apparent change in cardiopulmonary status. Mild cardiomegaly with small bilateral pleural effusions and bibasilar atelectasis. No new focal consolidation.         Electronically signed by: KAILEE JUSTICE MD  Date:     02/21/17  Time:    13:41     Narrative:    XR Chest    Procedure time: 02/21/17 12:52:47    Accession #: [id number]    CLINICAL INDICATION: 77 year old M with dyspnea     TECHNIQUE: Single frontal portable chest radiograph.    COMPARISON:  02/18/2017.    FINDINGS            US Retroperitoneal Complete (Kidney and (Final result) Result time:  02/20/17 01:05:29    Final result by Ryan George MD (02/20/17 01:05:29)    Impression:        Sonographic features suggestive of chronic medical renal disease. No evidence of hydronephrosis.      Electronically signed by: RYAN GEORGE  Date:     02/20/17  Time:    01:05     Narrative:    Time of Procedure: 02/19/17 19:41:31  Accession # [id number]    Reason for study: Acute kidney injury     Comparison: None    Technique: Routine renal ultrasound was performed.    Findings:   Please note examination is technically limited secondary to patient body habitus and inability to breath-hold.    The kidneys measure 11.0 cm on the right and 10.3 cm on the left. There is bilateral cortical thinning, decreased corticomedullary distinction, and increased cortical echogenicity. There is no evidence of hydronephrosis. There are small bilateral simple appearing renal cysts.Perfusion to the kidneys is decreased. Resistive indices are elevated and as follow: 0.80 on the right and 0.86 on the left. The urinary bladder is decompressed around a Marcial catheter.            X-Ray Chest AP Portable (Final result) Result time:  02/18/17 01:45:09    Final result by Syd Kingsley MD (02/18/17 01:45:09)    Impression:       Enteric tube tip below left hemidiaphragm  in appropriate position.    Cardiomegaly with interval increased attenuation of the pulmonary parenchyma and associated stable small bilateral pleural effusions.  The findings are suggestive of probable worsening CHF.  Clinical correlation and followup are suggested.    Nonspecific lucency in the right cardiophrenic angle.                  Electronically signed by: GAYATHRI MEDINA MD  Date:     02/18/17  Time:    01:45     Narrative:    Exam: [id number]  02/18/17  01:06:46 [id number] ([facility]) : XR CHEST AP PORTABLE    Technique:    Single frontal chest x-ray.    Comparison:    02/16/2017.    Findings:      Enteric tube courses below the left hemidiaphragm with its tip in the right upper abdominal quadrant in the region of the gastric antrum.  Previous monitoring EKG leads have been removed.      The trachea is unremarkable.There is stable enlargement of the cardiomediastinal silhouette.  There is no evidence of free air beneath the hemidiaphragms.  There is stable obscuration of both costophrenic angles.  There is no evidence of a pneumothorax.  There is nonspecific lucency in the right cardiophrenic angle.   There is increased attenuation of the pulmonary parenchyma.  There are degenerative changes in the osseous structures.            X-Ray Chest 1 View (Final result) Result time:  02/16/17 09:18:52    Final result by Parker Padron Jr., MD (02/16/17 09:18:52)    Narrative:    Chest single view compared to February 14.  NG tube directed toward the gastric antrum.  Increased opacification at the bases likely some pleural fluid left greater than right.  Heart remains enlarged.  No pneumothorax.    Impression NG tube in satisfactory position.      Electronically signed by: PARKER PADRON MD  Date:     02/16/17  Time:    09:18             X-Ray Chest AP Portable (Final result) Result time:  02/14/17 16:53:12    Final result by Anson Garcia DO (02/14/17 16:53:12)    Impression:        See Above       Electronically signed  by: MICHELLE MONREAL DO  Date:     02/14/17  Time:    16:53     Narrative:    Single portable frontal view of the chest    Comparison: None    Results: Ill-defined right basilar opacity concerning for effusion with atelectasis. There is no large pneumothorax. Atherosclerotic aorta. Nasogastric tube identified with looping configuration the epigastric region may be in the gastric cardia or distal esophagus. Clinical correlation and advancement advised. No large lung consolidation.            MRI Brain Without Contrast (Final result) Result time:  02/13/17 21:01:31    Final result by Jose G Amor MD (02/13/17 21:01:31)    Impression:        Occlusion of the left superior division of the M2 segment of the MCA with resulting large left cerebral infarct involving the left parietal, temporal, and frontal cortices.      Electronically signed by: JOSE G AMOR MD  Date:     02/13/17  Time:    21:01     Narrative:    MRI brain, MRA head,     02/13/17 19:50:00    Accession# [id number]      CLINICAL INDICATION: 77 year old M with stroke     TECHNIQUE: Multiplanar multisequence MR imaging of the brain was performed without intravenous contrast.  Examination performed in conjunction with a 3-D time-of-flight noncontrast MR angiogram of the intracranial vasculature.  Multiple MIP reconstructions were performed.    COMPARISON: No priors.    FINDINGS:  There is a large infarct of the left parietal lobe extending anteriorly to involve the very posterior sulci of the left frontal and temporal lobes. Otherwise, there are a few scattered T2 hyperintensities in the periventricular white matter. Possible remote infarct of the right centrum semi-ovale. The no intracranial blood products. No significant mass effect. No midline shift. Visualized portions of the calvarium and sinuses show no focal abnormality.    MRA:   Visualized portions of the carotid arteries and vertebral arteries are normal in course and caliber. Basilar artery is  widely patent. Posterior circulation is intact. Bilateral posterior communicating arteries are intact.    There is mild irregular stenoses of the bilateral MCA. There is occlusion of the superior branch of the M2 segments of the MCA.            MRA Brain (Final result) Result time:  02/13/17 21:01:31    Final result by Jose G Amor MD (02/13/17 21:01:31)    Impression:        Occlusion of the left superior division of the M2 segment of the MCA with resulting large left cerebral infarct involving the left parietal, temporal, and frontal cortices.      Electronically signed by: JOSE G AMOR MD  Date:     02/13/17  Time:    21:01     Narrative:    MRI brain, MRA head,     02/13/17 19:50:00    Accession# [id number]      CLINICAL INDICATION: 77 year old M with stroke     TECHNIQUE: Multiplanar multisequence MR imaging of the brain was performed without intravenous contrast.  Examination performed in conjunction with a 3-D time-of-flight noncontrast MR angiogram of the intracranial vasculature.  Multiple MIP reconstructions were performed.    COMPARISON: No priors.    FINDINGS:  There is a large infarct of the left parietal lobe extending anteriorly to involve the very posterior sulci of the left frontal and temporal lobes. Otherwise, there are a few scattered T2 hyperintensities in the periventricular white matter. Possible remote infarct of the right centrum semi-ovale. The no intracranial blood products. No significant mass effect. No midline shift. Visualized portions of the calvarium and sinuses show no focal abnormality.    MRA:   Visualized portions of the carotid arteries and vertebral arteries are normal in course and caliber. Basilar artery is widely patent. Posterior circulation is intact. Bilateral posterior communicating arteries are intact.    There is mild irregular stenoses of the bilateral MCA. There is occlusion of the superior branch of the M2 segments of the MCA.            X-Ray Abdomen AP 1  View (Final result) Result time:  02/13/17 17:39:18    Final result by Rosy Hicks MD (02/13/17 17:39:18)    Impression:     As above.      Electronically signed by: ROSY HICKS MD, MD  Date:     02/13/17  Time:    17:39     Narrative:    Comparison: Abdominal radiograph earlier same day at 1610 hrs.    Findings: Single AP portable semiupright view of the mid to upper abdomen which includes the lower chest. The lower abdomen and pelvis are not included in field-of-view. Enteric tube appears to have been retracted with port and tip projected over the medial left upper quadrant, likely within the proximal stomach. Otherwise, no significant interval change of the included lower chest and upper abdomen.            X-Ray Abdomen AP 1 View (Final result) Result time:  02/13/17 16:45:53    Final result by Anson Monreal DO (02/13/17 16:45:53)    Impression:     See Above .      Electronically signed by: ASNON MONREAL DO  Date:     02/13/17  Time:    16:45     Narrative:    Technique: Supine view of the upper abdomen    Comparison: 02/13/2017 at 1606    Results:Interval placement of a feeding tube with tip projected over the right upper abdomen in the region of the expected gastric pylorus/proximal duodenum. External monitors overlie the lower thorax and left mid abdomen. Continued few scattered gas-filled loops of bowel within the upper abdomen.            X-Ray Abdomen AP 1 View (Final result) Result time:  02/13/17 16:41:56    Final result by Anson Monreal DO (02/13/17 16:41:56)    Impression:     See Above .      Electronically signed by: ANSON MONREAL DO  Date:     02/13/17  Time:    16:41     Narrative:    Technique: Supine view the abdomen    Comparison: None    Results:Scattered gas-filled loops of bowel within the abdomen with gas and fecal material projected over the visualized colon. Overall nonspecific bowel gas pattern. No feeding tube projected over the visualized mediastinum or upper abdomen. Clinical  correlation advised. Multiple external leads project over the lower thorax.              Assessment and Plan:     Problem List:    Active Diagnoses:    Diagnosis Date Noted POA    PRINCIPAL PROBLEM:  Acute ischemic left MCA stroke [I63.512] 02/13/2017 Yes    Diabetic retinopathy associated with type 2 diabetes mellitus [E11.319] 02/20/2017 Yes    Acute respiratory failure with hypoxia [J96.01] 02/18/2017 Yes    Paroxysmal atrial fibrillation [I48.0] 02/14/2017 Yes    Hemiparesis, right [G81.91] 02/13/2017 Yes    Oral phase dysphagia [R13.11] 02/13/2017 Yes    Aphasia [R47.01] 02/13/2017 Yes    HTN (hypertension), malignant [I10] 02/13/2017 Yes    Iron deficiency anemia [D50.9] 02/13/2017 Yes    CKD (chronic kidney disease), stage III [N18.3] 02/13/2017 Yes    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 Atrial Fibrillation              2/14/2017: Diagnosed with paroxysmal atrial fibrillation.              [id number] (HA2S2V)              2/14/2017: Began amiodarone iv and metoprolol.              On amiodarone 200 mg Q6 and metoprolol 50 mg Q8.   2/21/2017: Into sinus rhythm.   2/22/2017: Back in atrial fibrillation.      2. Stroke Prevention              On aspirin 81 mg Q24.   Heparin 5,000 U sc Q8.              Would not favor anticoagulation at present.     3. Cerebrovascular Accident              Imaging tests reveal lacunar infarct.        VTE Risk Mitigation         Ordered     Medium Risk of VTE  Once      02/13/17 1421     Place sequential compression device  Until discontinued      02/13/17 1421          Anticipated Disposition: Long Term Care    Discharge Needs: Not clear at the present time.    Nicanor Freitas MD  Cardiology  [facility]

## 2017-02-23 NOTE — PT/OT/SLP PROGRESS
Physical Therapy  Treatment    Esteban Goins   MRN: [id number]   Admitting Diagnosis: Acute ischemic left MCA stroke    PT Received On: 02/23/17  PT Start Time: 1319     PT Stop Time: 1350    PT Total Time (min): 31 min       Billable Minutes:  Therapeutic Activity 12, Therapeutic Exercise 8 and Neuromuscular Re-education 10    Treatment Type: Treatment  PT/PTA: PT     PTA Visit Number: 0       General Precautions: Standard, aspiration, fall  Orthopedic Precautions: N/A   Braces: N/A    Do you have any cultural, spiritual, Scientologist conflicts, given your current situation?: unable to assess; pt nonverbal    Subjective:  Communicated with nurse prior to session.  Pt making eye contact when PT is on patient's L side and attending to voice. Groaning during session. Pt found incontinent of urine.       Pain Rating Post-Intervention:  (Occasional groaning during session, unable to locate or quantify pain. Withdrawing and increasing agitation with oral suctioning)    Objective:   Patient found with: oxygen, peripheral IV, telemetry (s/p PEG tube this AM)  L side lying in bed with HBO elevated  Functional Mobility:  Bed Mobility:   Rolling/Turning to Left: Total assistance (x 2 trials)  Rolling/Turning Right: Maximum assistance (x 2 trials)  Scooting/Bridging: Total Assistance, With assist of 2 (with drawsheet)  Supine to Sit: Total Assistance  Sit to Supine: Maximum Assistance (pt elevated L LE onto bed)         Balance:   Static Sit: Max A<>total A at trunk; pt demonstrated ability to make postural adjustments without use of L UE support  Dynamic Sit: Total A for R lateral lean (with return to upright sitting) onto R elbow x 10 reps.      Therapeutic Activities and Exercises:  PT assisted patient in D1/D2 PNF R UE and R LE x 10 reps. Noted slightly decreased alertness and tone compared to previous session. He went under general anesthesia for PEG tube this morning.    Proprioceptive tactile input through axis of R UE and R  LE in sitting. Active assist for R LE ankle pumps, long arc quads, hip internal/external rotation, and hip flexion in sitting (with total A at trunk).   Total assist for oral care and suctioning with [place] following wet productive cough in supine. Pt groaned, agitated, and tightly closed mouth around [place], therefore unable to complete oral care.     AM-PAC 6 CLICK MOBILITY  How much help from another person does this patient currently need?   1 = Unable, Total/Dependent Assistance  2 = A lot, Maximum/Moderate Assistance  3 = A little, Minimum/Contact Guard/Supervision  4 = None, Modified [place]/Independent    Turning over in bed (including adjusting bedclothes, sheets and blankets)?: 2  Sitting down on and standing up from a chair with arms (e.g., wheelchair, bedside commode, etc.): 1  Moving from lying on back to sitting on the side of the bed?: 1  Moving to and from a bed to a chair (including a wheelchair)?: 1  Need to walk in hospital room?: 1  Climbing 3-5 steps with a railing?: 1  Total Score: 7    AM-PAC Raw Score CMS G-Code Modifier Level of Impairment Assistance   6 % Total / Unable   7 - 9 CM 80 - 100% Maximal Assist   10 - 14 CL 60 - 80% Moderate Assist   15 - 19 CK 40 - 60% Moderate Assist   20 - 22 CJ 20 - 40% Minimal Assist   23 CI 1-20% SBA / CGA   24 CH 0% Independent/ Mod I     Patient left left sidelying with all lines intact, call button in reach, bed alarm on and nurse and PCT notified (who assisted with changing linens/pads due to urinary incontinence)  Assessment:  Esteban Goins is a 77 y.o. male with a medical diagnosis of Acute ischemic left MCA stroke Pt progressing slowly towards above goals. He required max<>total assist for sitting balance at edge of bed without use of L UE support. He continues to have coughing requiring assist for oral suctioning/oral care.     Rehab identified problem list/impairments: Rehab identified problem list/impairments: weakness, impaired self  care skills, impaired functional mobilty, impaired balance, decreased lower extremity function, decreased upper extremity function, decreased ROM, impaired coordination, impaired cognition    Rehab potential is fair.    Activity tolerance: Fair    Discharge recommendations: Discharge Facility/Level Of Care Needs: rehabilitation facility     Barriers to discharge: Barriers to Discharge: Inaccessible home environment, Decreased caregiver support    Equipment recommendations:       GOALS:   Physical Therapy Goals        Problem: Physical Therapy Goal    Goal Priority Disciplines Outcome Goal Variances Interventions   Physical Therapy Goal     PT/OT, PT Ongoing (interventions implemented as appropriate)     Description:  Goals to be met by: 3/14/17     Patient will increase functional independence with mobility by performin. Supine to sit with min A.  2. Sit to supine with min A.   3. Rolling R and L with min A.   4. Sitting at edge of bed >5 minutes with min A.   5. PT will assess sit<>stand.                 PLAN:    Patient to be seen daily  to address the above listed problems via therapeutic activities, therapeutic exercises, neuromuscular re-education, gait training  Plan of Care expires: 17  Plan of Care reviewed with: patient         Nely Ron, PT  2017

## 2017-02-23 NOTE — PLAN OF CARE
Problem: Patient Care Overview  Goal: Plan of Care Review  Outcome: Ongoing (interventions implemented as appropriate)  Plan of care reviewed with patient. Patient free of falls, injury or skin breakdown. Tolerated turn q2h. Peg tube used for water flushes and meds as ordered without difficulty. Peg tube dressing clean, dry and intact. SCD's maintained. Non-verbal indicted absence of pain. Incontinence care preformed PRN. A fib on telemetry. Bed lowered and locked, bed alarm set. Call light within reach. Patient resting comfortably in bed. Will continue to monitor.

## 2017-02-23 NOTE — PLAN OF CARE
Problem: Physical Therapy Goal  Goal: Physical Therapy Goal  Goals to be met by: 3/14/17     Patient will increase functional independence with mobility by performin. Supine to sit with min A.  2. Sit to supine with min A.   3. Rolling R and L with min A.   4. Sitting at edge of bed >5 minutes with min A.   5. PT will assess sit<>stand.    Outcome: Ongoing (interventions implemented as appropriate)  Pt progressing slowly towards above goals. He required max<>total assist for sitting balance at edge of bed without use of L UE support. He continues to have coughing requiring assist for oral suctioning/oral care. Will continue to follow and progress as tolerated. Please see progress note for detailed plan of care and recommendations.

## 2017-02-23 NOTE — PROGRESS NOTES
Progress Note  Hospital Medicine    Admit Date: 2/13/2017   LOS: 10 days     SUBJECTIVE:     Follow-up For:  Acute ischemic left MCA stroke    Interval history: Esteban Clay is a 77 y.o. male who presents with right hemiparesis and aphasia. He was apparently found by his brother in the morning and it was noted that he had signs of a stroke. EMS was called and he was brought to [facility]. CT of the head shows Small 5mm low density area in the left thalamus poss. Small lacunar infarct. Chronic small vessel ischemic changes are noted. On arrival the pt. Was severely hypertensive (200/119). He was transferred to our facility for higher level of care.      Interval events:  - MRI/MRA showed large left cerebral infarction of the temoral and frontal cortices.   - pt. Has dysphagia and remains NPO. PEG planned.  - remains non verbal, not able to follow commands. He tracks with his eyes to some degree but does not seem to understand   - PEG placed this AM  - Creatinine shows some improvement.   - no new sx.     Review of Systems:  Review of Systems   Unable to perform ROS: Medical condition     OBJECTIVE:     Vital Signs Range (Last 24H):  Temp:  [96.9 °F (36.1 °C)-98.8 °F (37.1 °C)]   Pulse:  []   Resp:  [18-90]   BP: (131-164)/(65-85)   SpO2:  [90 %-98 %]     I & O (Last 24H):    Intake/Output Summary (Last 24 hours) at 02/23/17 1306  Last data filed at 02/23/17 1200   Gross per 24 hour   Intake           1786.5 ml   Output                0 ml   Net           1786.5 ml       Physical Exam:  Physical Exam   Constitutional: He is well-developed, well-nourished, and in no distress.   HENT:   Head: Normocephalic.   Neck: Normal range of motion. No JVD present. No thyromegaly present.   Cardiovascular: Normal rate and regular rhythm.    No murmur heard.  Pulmonary/Chest: Effort normal. He has no wheezes.   ronchi bilaterally, tachypnea present.    Abdominal: Soft. He exhibits distension. He exhibits no  mass. There is no tenderness. There is no rebound.   PEG in place   Musculoskeletal: He exhibits no edema.   Neurological:   Non verbal, opens eyes on command. Flaccid paralysis of the right.    Skin: He is not diaphoretic. No erythema.       Medications:   albuterol-ipratropium 2.5mg-0.5mg/3mL  3 mL Nebulization Q6H    amiodarone  200 mg Oral QID (WM & HS)    aspirin  81 mg Oral Daily    atorvastatin  40 mg Oral Daily    calcitRIOL  0.25 mcg Oral Daily    famotidine (PF)  20 mg Intravenous BID    febuxostat  40 mg Per NG tube Daily    ferrous sulfate  325 mg Oral BID    insulin detemir  25 Units Subcutaneous BID    metoprolol tartrate  50 mg Oral BID    nifedipine 30 MG ORAL TR24  60 mg Oral Daily    vitamin D  2,000 Units Oral Daily       Laboratory:   CBC:     Recent Labs  Lab 02/23/17  0537   WBC 16.63*   RBC 5.18   HGB 9.8*   HCT 32.0*   *   MCV 62*   MCH 18.9*   MCHC 30.6*     CMP:     Recent Labs  Lab 02/23/17  0537   *   CALCIUM 8.9   *   K 4.5   CO2 21*   *   BUN 81*   CREATININE 3.6*       POCT Glucose   Date Value Ref Range Status   02/23/2017 207 (H) 70 - 110 mg/dL Final   02/23/2017 152 (H) 70 - 110 mg/dL Final   02/22/2017 182 (H) 70 - 110 mg/dL Final   02/22/2017 187 (H) 70 - 110 mg/dL Final   02/22/2017 219 (H) 70 - 110 mg/dL Final   02/22/2017 136 (H) 70 - 110 mg/dL Final   02/21/2017 282 (H) 70 - 110 mg/dL Final   02/21/2017 302 (H) 70 - 110 mg/dL Final   02/21/2017 292 (H) 70 - 110 mg/dL Final   02/21/2017 232 (H) 70 - 110 mg/dL Final   02/21/2017 229 (H) 70 - 110 mg/dL Final   02/20/2017 203 (H) 70 - 110 mg/dL Final   02/20/2017 223 (H) 70 - 110 mg/dL Final         Diagnostic Results:  Labs: Reviewed  X-Ray: Reviewed  MRI: Reviewed    ASSESSMENT/PLAN:     Active Hospital Problems    Diagnosis  POA    *Acute ischemic left MCA stroke [I63.512]  Yes    Diabetic retinopathy associated with type 2 diabetes mellitus [E11.319]  Yes    Acute respiratory failure  with hypoxia [J96.01]  Yes    Paroxysmal atrial fibrillation [I48.0]  Yes    Hemiparesis, right [G81.91]  Yes    Oral phase dysphagia [R13.11]  Yes    Aphasia [R47.01]  Yes    HTN (hypertension), malignant [I10]  Yes    Iron deficiency anemia [D50.9]  Yes    CKD (chronic kidney disease), stage III [N18.3]  Yes    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     1: Acute ischemic left MCA stroke  - debilitating stroke with right flaccid paralysis and dysphagia  - no significant recovery of neurologic function since admission  - in need for PEG, planned for tomorrow. NGT until then  - s/p permissive HTN, now normotensive    2: DM2:   - poor control, improved  - on detemir 20 qhs and ISS, adjust  - has been non compliant in the past per sister.   Lab Results   Component Value Date    HGBA1C 9.7 (H) 02/13/2017     3: HTN:   - controlled on Procardia 60 qd, Metoprolol 50 BID    4:  CKD III with ARF  - poss. Pre-renal component  - continues with IVF per nephrology  - likely underlying DN/ HTN nephropathy  - is s/p contrast exposure and ACE/Lasix.   - has had positive balance.   - creatinine improving.     5: A-fib with RVR  - has converted with Amiodarone iv and metoprolol  - has begun oral amiodarone and metorpolol  - has intermittent A-fib  - Anticoagulation not favored currently.     6: Prophylaxis  - on heparin.     Overall very little neurologic recovery so far. Will discuss with family further wishes tomorrow. PT recommends rehab facility.

## 2017-02-23 NOTE — TRANSFER OF CARE
"Anesthesia Transfer of Care Note    Patient: Esteban Poolari    Procedure(s) Performed: Procedure(s) (LRB):  ESOPHAGOGASTRODUODENOSCOPY (EGD) with PEG placement (N/A)    Patient location: PACU    Anesthesia Type: general    Transport from OR: Transported from OR on 6-10 L/min O2 by face mask with adequate spontaneous ventilation    Post pain: adequate analgesia    Post assessment: no apparent anesthetic complications    Post vital signs: stable    Level of consciousness: responds to stimulation    Nausea/Vomiting: no nausea/vomiting    Complications: none          Last vitals:   Visit Vitals    BP (!) 154/80    Pulse 84    Temp 36.3 °C (97.3 °F) (Axillary)    Resp 20    Ht 5' 10" (1.778 m)    Wt 76.9 kg (169 lb 8.5 oz)    SpO2 (!) 92%    BMI 24.33 kg/m2     "

## 2017-02-23 NOTE — PLAN OF CARE
Problem: Patient Care Overview  Goal: Plan of Care Review  Outcome: Ongoing (interventions implemented as appropriate)  No significant events overnight. Remains free from fall, injury, and skin breakdown. Incontinence care performed. VSS on BiPAP throughout the night. Turned q2h. Tele maintained; all alarms active and audible. Accuchecks maintained q6h. Plan of care reviewed with patient and all questions answered. Bed low, locked w/ bed alarm on. Call light within reach. Purposeful rounding performed. Resting comfortably in bed, no other complaints at this time.

## 2017-02-23 NOTE — PLAN OF CARE
Problem: Patient Care Overview  Goal: Plan of Care Review  Outcome: Ongoing (interventions implemented as appropriate)  Patient in mild distress. Pt is grunting and is tachypneic. Pt is non verbal.. Sat's 90-93  % on 2 lpm . Aerosol treatment given Q6. Pt placed on BIPAP with settings as documented. Will continue to monitor.

## 2017-02-23 NOTE — OR NURSING
S/p peg tube placement. Gauze and abd over insert sight. Small amt bloody drainage noted to   Gauze

## 2017-02-23 NOTE — PT/OT/SLP PROGRESS
Speech Language Pathology      Esteban Goins  MRN: [id number]    Patient not seen today secondary to peg tube p lacement  . Will follow-up 2/24  Pippa Harris MA, CCC-SLP

## 2017-02-23 NOTE — PT/OT/SLP PROGRESS
Occupational Therapy  Treatment    Esteban Goins   MRN: [id number]   Admitting Diagnosis: Acute ischemic left MCA stroke    OT Date of Treatment: 02/23/17   OT Start Time: 1432  OT Stop Time: 1455  OT Total Time (min): 23 min    Billable Minutes:  Neuromuscular Re-education 13 and Cognitive Retraining 10    General Precautions: Standard, fall, NPO  Orthopedic Precautions: N/A  Braces: N/A    Do you have any cultural, spiritual, Druze conflicts, given your current situation?:  (pt nonverbal)    Subjective:  Pt found supine in bed. Pt is nonverbal but groaned and grunted during the session.     Pain Rating:  (pt unable to vocalize pain, groaned during session)                   Objective:  Patient found with: peripheral IV, oxygen, telemetry     Functional Mobility:  Bed Mobility:  Rolling/Turning to Left: Total assistance  Rolling/Turning Right: Total assistance, With assist of 2  Scooting/Bridging: With assist of 2, Total Assistance  Supine to Sit: Total Assistance, With assist of 2 (some movement of LLE off of bed)  Sit to Supine: Total Assistance, With assist of 2 (some movement of LLE onto bed)    Transfers:    None    Functional Ambulation: None    Activities of Daily Living:     None         Balance:   Static Sit: POOR: Needs MODERATE assist to maintain (fluctuating between min-max A throughout sitting EOB)  Dynamic Sit: POOR (pt responded to changes in postural adjustment less than 10%, spontaneously reached with LUE to right himself one time)      Therapeutic Activities and Exercises:  Neurological Re-Education: Facilitated movement into the R UE and LE by using compression and resistance with manual and verbal cueing, pt inconsistently initiated UE reach and LE extension with proprioceptive input and facilitated movement  Cognitive: Pt inconsistently and briefly responded to verbal and somatosensory cues, with less response to visual cues this session in comparison to previous session. Addressed sitting  balance with intermittent postural displacement. No visual fixation or tracking on command.    AM-PAC 6 CLICK ADL   How much help from another person does this patient currently need?   1 = Unable, Total/Dependent Assistance  2 = A lot, Maximum/Moderate Assistance  3 = A little, Minimum/Contact Guard/Supervision  4 = None, Modified [place]/Independent    Putting on and taking off regular lower body clothing? : 1  Bathing (including washing, rinsing, drying)?: 1  Toileting, which includes using toilet, bedpan, or urinal? : 1  Putting on and taking off regular upper body clothing?: 1  Taking care of personal grooming such as brushing teeth?: 1  Eating meals?: 1  Total Score: 6     AM-PAC Raw Score CMS G-Code Modifier Level of Impairment Assistance   6 % Total / Unable   7 - 9 CM 80 - 100% Maximal Assist   10 - 14 CL 60 - 80% Moderate Assist   15 - 19 CK 40 - 60% Moderate Assist   20 - 22 CJ 20 - 40% Minimal Assist   23 CI 1-20% SBA / CGA   24 CH 0% Independent/ Mod I     Patient left supine with all lines intact, call button in reach and bed alarm on    ASSESSMENT:  Esteban Goins is a 77 y.o. male with a medical diagnosis of Acute ischemic left MCA stroke and presents with impaired cognition, memory, R visual and spatial neglect, impaired balance, strength and inconsistent ability to respond to commands.  Pt required total A x2 for bilateral rolling, bridging in bed and supine<>sit. Pt tolerated sitting EOB x10 min. Facilitated movement into the R UE and LE by using compression and resistance with manual and verbal cueing. Pt inconsistently elicited movement in response to facilitated movement and proprioceptive feedback. Pt had brief inconsistent responses to verbal and somatosensory cues approximately 25%. He had good visual regard of therapist face at the beginning of the session but was less response to visual cues this session in comparison to yesterday (visual orientation and eye tracking). Addressed  sitting balance with intermittent postural displacement. Pt had automatic movement of his LUE to right himself with his hand on the bed for support on one occasion. Pt showed no signs of agitation but was less responsive to cues than prior session. OT treatment continues to be needed to improve orientation, alertness, and overall function in mobility, self-care, and cognition.      Rehab identified problem list/impairments: Rehab identified problem list/impairments:  (Performance Deficits Affecting Function: decreased cognition, memory, R visual and spatial neglect, impaired balance, strength and inconsistent ability to follow commands)    Rehab potential is fair.    Activity tolerance: Fair    Discharge recommendations: Discharge Facility/Level Of Care Needs: rehabilitation facility     Barriers to discharge: Barriers to Discharge: Inaccessible home environment, Decreased caregiver support    Equipment recommendations:  (TBD)     GOALS:   Occupational Therapy Goals        Problem: Occupational Therapy Goal    Goal Priority Disciplines Outcome Interventions   Occupational Therapy Goal     OT, PT/OT Ongoing (interventions implemented as appropriate)    Description:  Goals to be met by: 2017    Patient will increase functional independence with ADLs by performin. Maximum Assist Supine-Sit EOB  2. Orients to visual and somatosensory quest for orientation-memory 50%  3. Mod A manual cuing during dynamic sitting balance training EOB without attempting to use LUE assist for support  4. Visual tracking to face and large object 30%                   Plan:  Patient to be seen 6 x/week to address the above listed problems via self-care/home management, therapeutic activities, therapeutic exercises, neuromuscular re-education, cognitive retraining  Plan of Care expires: 17  Plan of Care reviewed with: patient         Christianne hSelia, LAKEISHA  2017

## 2017-02-23 NOTE — PROGRESS NOTES
Renal Progress Note    Admit Date: 2/13/2017   LOS: 10 days     SUBJECTIVE:     Discussed with treatment team.  Pt remains aphasic following massive CVA.  Renal fxn improving with IVF's.  PEG tube placed this am.       Scheduled Meds:   albuterol-ipratropium 2.5mg-0.5mg/3mL  3 mL Nebulization Q6H    amiodarone  200 mg Oral QID (WM & HS)    aspirin  81 mg Oral Daily    atorvastatin  40 mg Oral Daily    calcitRIOL  0.25 mcg Oral Daily    famotidine (PF)  20 mg Intravenous BID    febuxostat  40 mg Per NG tube Daily    ferrous sulfate  325 mg Oral BID    insulin detemir  25 Units Subcutaneous BID    metoprolol tartrate  50 mg Oral BID    nifedipine 30 MG ORAL TR24  60 mg Oral Daily    vitamin D  2,000 Units Oral Daily       OBJECTIVE:     Vital Signs Range (Last 24H):  Temp:  [96.9 °F (36.1 °C)-98.8 °F (37.1 °C)]   Pulse:  []   Resp:  [18-90]   BP: (131-164)/(65-85)   SpO2:  [90 %-98 %]     I & O (Last 24H):    Intake/Output Summary (Last 24 hours) at 02/23/17 1248  Last data filed at 02/23/17 1200   Gross per 24 hour   Intake           1786.5 ml   Output                0 ml   Net           1786.5 ml       Physical Exam:  Well developed, (Aphasic) but awake  Neck supple no LAD  Irr Irr HR in 60's No rubs +S4  Course BS upper AW .  Abd soft NTND +BS, obese, PEG noted.  LUE severely deformed with thumb and 3rd digit remaining. Healed scars present  Rided flaccid paralysis  No LE edema          Laboratory:  CBC:     Recent Labs  Lab 02/23/17  0537   WBC 16.63*   RBC 5.18   HGB 9.8*   HCT 32.0*   *   MCV 62*   MCH 18.9*   MCHC 30.6*     BMP:     Recent Labs  Lab 02/23/17  0537   *   *   K 4.5   *   CO2 21*   BUN 81*   CREATININE 3.6*   CALCIUM 8.9       Lab Results   Component Value Date    URICACID 10.3 (H) 02/19/2017     ASSESSMENT/PLAN:        78 YO male with massive acute ischemic left MCA stroke and hypertensive emergency. 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          Slowly  improving Non-Oliguric SONYA on CKD3/4- baseline unclear. He did undergo a dye load with the MRA on admission which might have induced a ADDI.  Was also on ACE and given few doses of lasix.  Proteinuria about 1.4 grams by ratio.  Suspect underlying CKD due to combo of hypertension and DM based on history (has +retinopathy). Serology neg thus far, US shows chronic MRDz.. No need for RRT yet.  Continue with IVF's for now.  Would not be an ideal candidate for RRT.  Renally dose meds, avoid nephrotoxins, and monitor I/O's closely.        HTN: As now. No ACE, ARB, or diuretic.        Afib with RVR, new onset: Per Cards. On amiodarone.        Protein-Calorie malnutrition: PEG placed.       DM: Per primary.  + History of retinopathy so most likely underlying Diabetic nephropathy as well.        Hyperuricemia: changed allopurinol to Uloric.       Secondary HPT and Vit D defic:  Started oral Vit D3 and Calcitriol      Anemia:  Iron deficient. Likely of CKD.  Started oral iron for now.        Mild Hypernatremia/Hyperchloridemia (E87.0):  Changed IVF's to 1/2 NS for now.  Use free water when able to use PEG.         See above recs.  Discussed with treatment team.  Poor overall prognosis with severity of CVA and co-morbidities.   Need to consider DNR/Hospice

## 2017-02-24 NOTE — PLAN OF CARE
Problem: Patient Care Overview  Goal: Plan of Care Review  Outcome: Ongoing (interventions implemented as appropriate)  Patient received on 2L NC SAT 94%, Q6 TX given. Will continue to monitor.

## 2017-02-24 NOTE — PT/OT/SLP PROGRESS
Occupational Therapy  Treatment    Esteban Goins   MRN: [id number]   Admitting Diagnosis: Acute ischemic left MCA stroke    OT Date of Treatment: 02/24/17   OT Start Time: 1502  OT Stop Time: 1522  OT Total Time (min): 20 min    Billable Minutes:  Therapeutic Activity 9 and Cognitive Retraining 11    General Precautions: Standard, fall, NPO  Orthopedic Precautions: N/A  Braces: N/A    Do you have any cultural, spiritual, Bahai conflicts, given your current situation?:  (pt nonverbal)    Subjective:  Pt found supine in bed. Pt was alert but nonverbal.     Pain Rating:  (unable to vocalize pain)              Pain Rating Post-Intervention:  (pt groaned and grimaced during session)    Objective:  Patient found with: peripheral IV, telemetry, oxygen     Functional Mobility:  Bed Mobility:  Rolling/Turning to Left: Total assistance  Rolling/Turning Right: Total assistance    Transfers:    None    Functional Ambulation: None    Activities of Daily Living:  Toileting Where Assessed: Bed level  Toileting Level of Assistance: Total assistance       Therapeutic Activities and Exercises:  Addressed visual tracking with inconsistent responses, approximately 30%. Pt did not elicit movement (RUE reach or RLE extension) on the right side with proprioceptive feedback, verbal cueing and repetitive movement. Pt did not elicit any purposeful reach or movement of the LUE with verbal or somatosensory cues. He had improved ability to respond to somatosensory and verbal cues on his left side nearly 100% of the time. Pt was able to turn his head in response to verbal cues from his right side in each attempt but not in response to somatosensory or visual cues.     AM-PAC 6 CLICK ADL   How much help from another person does this patient currently need?   1 = Unable, Total/Dependent Assistance  2 = A lot, Maximum/Moderate Assistance  3 = A little, Minimum/Contact Guard/Supervision  4 = None, Modified [place]/Independent    Putting on and  taking off regular lower body clothing? : 1  Bathing (including washing, rinsing, drying)?: 1  Toileting, which includes using toilet, bedpan, or urinal? : 1  Putting on and taking off regular upper body clothing?: 1  Taking care of personal grooming such as brushing teeth?: 1  Eating meals?: 1  Total Score: 6     AM-PAC Raw Score CMS G-Code Modifier Level of Impairment Assistance   6 % Total / Unable   7 - 9 CM 80 - 100% Maximal Assist   10 - 14 CL 60 - 80% Moderate Assist   15 - 19 CK 40 - 60% Moderate Assist   20 - 22 CJ 20 - 40% Minimal Assist   23 CI 1-20% SBA / CGA   24 CH 0% Independent/ Mod I     Patient left supine with all lines intact, call button in reach, bed alarm on and nurse and PCT notified    ASSESSMENT:  Esteban Goins is a 77 y.o. male with a medical diagnosis of Acute ischemic left MCA stroke and presents with decreased cognition, spatial and motor neglect of R side, and an inability to follow commands.  Pt seemed more oriented and alert this session. He had improved ability to respond to somatosensory and verbal cues on his right side nearly 100% of the time. Pt was able to turn his head in response to verbal cues from his left side in each attempt but not in response to somatosensory or visual cues. Pt required total A to roll bilaterally. Pt inconsistently visually tracked therapist and objects, approximately 30% of the time. Pt had slight increase in RLE tone in comparison to last session. Pt was unable to elicit movement (RUE reach and RLE extension) with proprioceptive feedback, verbal cues and repetitive movements this session. Pt was unable to elicit intentional movement of the LUE with verbal and somatosensory cues. Pt could benefit from continuing OT services to address mobility, attention and orientation.    Rehab identified problem list/impairments: Rehab identified problem list/impairments:  (Performance Deficits Affecting Function: decreased cognition, spatial and motor  neglect of R side, inability to follow commands)    Rehab potential is fair.    Activity tolerance: Fair    Discharge recommendations: Discharge Facility/Level Of Care Needs: nursing facility, skilled     Barriers to discharge: Barriers to Discharge: Inaccessible home environment, Decreased caregiver support    Equipment recommendations:  (TBD)     GOALS:   Occupational Therapy Goals        Problem: Occupational Therapy Goal    Goal Priority Disciplines Outcome Interventions   Occupational Therapy Goal     OT, PT/OT Ongoing (interventions implemented as appropriate)    Description:  Goals to be met by: 2017    Patient will increase functional independence with ADLs by performin. Maximum Assist Supine-Sit EOB  2. Orients to visual and somatosensory quest for orientation-memory 50%  3. Mod A manual cuing during dynamic sitting balance training EOB without attempting to use LUE assist for support  4. Visual tracking to face and large object 30%                   Plan:  Patient to be seen 6 x/week to address the above listed problems via self-care/home management, therapeutic activities, therapeutic exercises, neuromuscular re-education, cognitive retraining  Plan of Care expires: 17  Plan of Care reviewed with: patient         LAKEISHA Morse  2017

## 2017-02-24 NOTE — PT/OT/SLP PROGRESS
Physical Therapy  Treatment    Esteban Goins   MRN: [id number]   Admitting Diagnosis: Acute ischemic left MCA stroke    PT Received On: 02/24/17  PT Start Time: 1308     PT Stop Time: 1333    PT Total Time (min): 25 min       Billable Minutes:  Therapeutic Activity 10 and Neuromuscular Re-education 15    Treatment Type: Treatment  PT/PTA: PT     PTA Visit Number: 0       General Precautions: Standard, fall, aspiration  Orthopedic Precautions: N/A     Do you have any cultural, spiritual, Christian conflicts, given your current situation?: unable to assess; pt nonverbal    Subjective:  Communicated with RN prior to session.    Pain Rating:  (Unable to vocalize pain)  Pain Rating Post-Intervention:  (Increased agitation with attempts to felicia nasal cannula, pt using LUE to cover face )    Objective:   Patient found with: peripheral IV, oxygen, telemetry (nasal cannula found doffed in bed and pt did not let therapist felicia again until after session )    Functional Mobility:  Bed Mobility:   Rolling/Turning Right: Maximum assistance (x 1 pt required max A for L knee flexion and pt able to maintaing requiring max A for reaching activities with LUE)  Scooting/Bridging: Total Assistance (x 2 person drawsheet towards HOB )  Supine to Sit: Total Assistance  Sit to Supine: Maximum Assistance (pt able to elevated LLE onto bed)    Balance:   Static Sit: POOR: Needs MODERATE assist to maintain Tolerated sitting at EOB x 15 min   Dynamic Sit: POOR: N/A     Therapeutic Activities and Exercises:  In supine position performed PNF patterns D1/D2 with total assist and verbal cues for tracking with eyes (unable to perform)   In sitting at EOB pt presents with LUE pushing trunk to the R requiring mod A to prevent LOB.   Pt LUE placed in lap and therapist performed L lateral minimal pertubation with pt reaction to brace with LUE x 5 trials and pushing back into midline position (crossing over into pushing syndrome again).    Pt intermittently  requiring min A to maintain upright sitting balance at trunk for ~ 3 seconds before requiring mod-max A to prevent trunk LOB.   RUE shoulder subluxation, performed proprioceptive input into RUE     AM-PAC 6 CLICK MOBILITY  How much help from another person does this patient currently need?   1 = Unable, Total/Dependent Assistance  2 = A lot, Maximum/Moderate Assistance  3 = A little, Minimum/Contact Guard/Supervision  4 = None, Modified [place]/Independent    Turning over in bed (including adjusting bedclothes, sheets and blankets)?: 2  Sitting down on and standing up from a chair with arms (e.g., wheelchair, bedside commode, etc.): 1  Moving from lying on back to sitting on the side of the bed?: 1  Moving to and from a bed to a chair (including a wheelchair)?: 1  Need to walk in hospital room?: 1  Climbing 3-5 steps with a railing?: 1  Total Score: 7    AM-PAC Raw Score CMS G-Code Modifier Level of Impairment Assistance   6 % Total / Unable   7 - 9 CM 80 - 100% Maximal Assist   10 - 14 CL 60 - 80% Moderate Assist   15 - 19 CK 40 - 60% Moderate Assist   20 - 22 CJ 20 - 40% Minimal Assist   23 CI 1-20% SBA / CGA   24 CH 0% Independent/ Mod I     Patient left supine with all lines intact, call button in reach, bed alarm on and RN notified.    Assessment:  Pt progressing slowly towards goals. Tolerated sitting EOB x 15 min with various amount of assist. At beginning of session pt tracking therapist with eyes in supine position but unable to rotate trunk or cervical spine to the R to visual therapist throughout session despite maximal cues. Pt would benefit from continued skilled PT to maximize independence and safety with functional mobility.    Rehab identified problem list/impairments: Rehab identified problem list/impairments: weakness, impaired balance, decreased upper extremity function, decreased lower extremity function, gait instability, impaired coordination, impaired cardiopulmonary response to  activity, impaired fine motor, decreased safety awareness, visual deficits, impaired self care skills, impaired functional mobilty, decreased coordination, abnormal tone, pain, impaired cognition    Rehab potential is fair.    Activity tolerance: Fair    Discharge recommendations: Discharge Facility/Level Of Care Needs: rehabilitation facility     Barriers to discharge: Barriers to Discharge: Inaccessible home environment, Decreased caregiver support    Equipment recommendations: TBD    GOALS:   Physical Therapy Goals        Problem: Physical Therapy Goal    Goal Priority Disciplines Outcome Goal Variances Interventions   Physical Therapy Goal     PT/OT, PT Ongoing (interventions implemented as appropriate)     Description:  Goals to be met by: 3/14/17     Patient will increase functional independence with mobility by performin. Supine to sit with min A.  2. Sit to supine with min A.   3. Rolling R and L with min A.   4. Sitting at edge of bed >5 minutes with min A.   5. PT will assess sit<>stand.               PLAN:    Patient to be seen daily  to address the above listed problems via therapeutic activities, therapeutic exercises, neuromuscular re-education, gait training  Plan of Care expires: 17  Plan of Care reviewed with: patient    Alla Jerome, PT  2017

## 2017-02-24 NOTE — PT/OT/SLP PROGRESS
Speech Language Pathology  Treatment    Esteban Goins   MRN: [id number]   Admitting Diagnosis: Acute ischemic left MCA stroke    Diet recommendations: Solid Diet Level: NPO  Liquid Diet Level: NPO Trial oral feedings in speech therapy only  SLP Treatment Date: 17  Speech Start Time: 105     Speech Stop Time: 1106     Speech Total (min): 11 min       TREATMENT BILLABLE MINUTES:  Speech Therapy Individual 11 min    Has the patient been evaluated by SLP for swallowing? : Yes  Keep patient NPO?: Yes   General Precautions: Standard, aspiration  Current Respiratory Status: nasal cannula       Subjective:  Pt in bed, sleeping, alerted to his name.    Objective:    Social Interaction: 1 Total Assistance--The patient interact appropriately les than 25% of the time, or not at all, and may need restraint due to socially inappropriate behaviors.   Problem Solvin Total Assistance--The patient solves routine problems less than 25% of the time, or does not effectively solve problems, and may require constant one-to-one direction to complete simple daily activities.  The patient may need a restraint for safety.   Comprehension: 1 Total Assistance--The patient understands direction and conversation about basic daily needs less than 25% of the time, or does no understand simple, commonly used spoken expressions (e.g hello, how are you) or gestures (e.g. waving good-bye, thank   Expression: 1 Total Assistance--The patient expresses basic daily needs and ideas less than 25% of the time, or does not express basic needs appropriately or consistently despite prompting.   Memory: 1 Total Assistance--The patient recognizes and remembers less than 25% of the time, or does not effectively recognize and remember.    COGNITIVE COMMUNICATION STATUS: Pt sleeping, alerted to his name. Attempted to localize to his name, unable to locate speaker on his right. Able to make brief eye contact with speaker on the left, unable to sustain eye  contact more than 1-2 secs. Severely dcr ability to focus and sustain attention. Able to follow a simple command x1, close your eyes.  No yes/no modality elicited. No verbalizations elicited. Primitive vocalizations only: moaning only.  No attempts at gestural communication.    SWALLOWING: Dry cracked lips and tongue, opened mouth resting posture. Oral care provided. No oral or pharyngeal swallow stimulated with oral care or with spoon dipped on water. No lip closure, lingual extention retraction, or functional oral motor response to oral stimulation.    Assessment:  Esteban Goins is a 77 y.o. male with a medical diagnosis of Acute ischemic left MCA stroke and presents with severe to profound cognitive communication and swallowing deficits. Continue alternate feeding and nutrition method.    Discharge recommendations: Discharge Facility/Level Of Care Needs: rehabilitation facility     Goals:   SLP Goals        Problem: SLP Goal    Goal Priority Disciplines Outcome   SLP Goal     SLP           Problem: SLP Goal    Goal Priority Disciplines Outcome   SLP Goal     SLP    Description:  1.  The pt will follow 2-3 simple commands per session, given max visual/verbal cues, and structured environment to facilitate response.  2. The pt will participate in any simple automatic speech task x1 per session, given max multimodal cues.  3. The pt will demonstrate yes/no response x3 per session via any modality, given max multimodal cues.               Plan:   Patient to be seen Therapy Frequency: 5 x/week   Plan of Care expires: 03/16/17  Plan of Care reviewed with: patient  SLP Follow-up?: Yes              Judith Light, CCC-SLP  02/24/2017

## 2017-02-24 NOTE — PLAN OF CARE
Problem: Occupational Therapy Goal  Goal: Occupational Therapy Goal  Goals to be met by: 2017    Patient will increase functional independence with ADLs by performin. Maximum Assist Supine-Sit EOB  2. Orients to visual and somatosensory quest for orientation-memory 50%  3. Mod A manual cuing during dynamic sitting balance training EOB without attempting to use LUE assist for support  4. Visual tracking to face and large object 30%     Outcome: Ongoing (interventions implemented as appropriate)  Pt found supine in bed. Pt seemed more oriented and alert this session. He had improved ability to respond to somatosensory and verbal cues on his Left side nearly 100% of the time. Pt was able to turn his head in response to verbal cues from his Right side in each attempt but not in response to somatosensory or visual cues. Pt required total A to roll bilaterally. Pt inconsistently visually tracked therapist and objects, approximately 30% of the time. Pt had slight increase in RLE tone in comparison to last session. Pt was unable to elicit movement (RUE reach and RLE extension) with proprioceptive feedback, verbal cues and repetitive movements this session. Pt could benefit from continuing OT services to address mobility, attention and orientation.

## 2017-02-24 NOTE — PLAN OF CARE
Patient Name: Esteban Goins  MRN: [id number]  Patient Class: IP- Inpatient   Admission Date: 2/13/2017  Length of Stay:  11 days  Attending Physician: Cristy Cullen MD/Christos Hernández MD  Primary Care Provider: [facility]     Discharge Planning:  Spoke with sister (Perta)  Pt and family are aware of the plan (SNF)  [facility]  Nurse report, [phone]   4 p today  [facility]  IMM and Pt choice forms discussed via telephone with sister/PCG, Petra.  Both forms placed in blue chart before discharge  Case mgt to remain supportive.

## 2017-02-24 NOTE — PLAN OF CARE
[facility]  [address] [place]. [address]    Facility Transfer Orders                        02/24/2017    Admit to: SNF    Diagnoses:  Active Hospital Problems    Diagnosis  POA    *Acute ischemic left MCA stroke [I63.512]  Yes    Diabetic retinopathy associated with type 2 diabetes mellitus [E11.319]  Yes    Acute respiratory failure with hypoxia [J96.01]  Yes    Paroxysmal atrial fibrillation [I48.0]  Yes    Hemiparesis, right [G81.91]  Yes    Oral phase dysphagia [R13.11]  Yes    Aphasia [R47.01]  Yes    HTN (hypertension), malignant [I10]  Yes    Iron deficiency anemia [D50.9]  Yes    CKD (chronic kidney disease), stage III [N18.3]  Yes    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       Allergies:Review of patient's allergies indicates:  No Known Allergies    Vitals:       Every shift (Skilled Nursing patients)    Diet: NPO                                Tube Feeding:     - Type:  House   diabetic    - Bolus 1can  Every 8 hours    - Flush tube with 100 mL water every 6 hours    Activity:      - Up in a chair each morning as tolerated    Nursing Precautions:     - Aspiration precautions:             - Total assistance with meals            -  Upright 90 degrees befor during and after meals             -  Suction at bedside          - Decubitus precautions:        -  for positioning   - Pressure reducing foam mattress   - Turn patient every two hours. Use wedge pillows to anchor patient    CONSULTS:      PT to evaluate and treat - five times a week     OT to evaluate and treat - five times a week     ST to evaluate and treat     Nutrition to evaluate and recommend diet      LABS:  Per routine      DIABETES CARE:      Check blood sugar:       Fingerstick blood sugar AC and HS      Report CBG < 60 or > 400 to physician.                                           Insulin Sliding Scale          Glucose  Novolog Insulin Subcutaneous        0 - 60   Orange juice or glucose tablet, hold insulin      No insulin   201-250  2 units   251-300  4 units   301-350  6 units   351-400  8 units   >400   10 units then call physician      Medications: Discontinue all previous medication orders, if any. See new list below.         Medication List      START taking these medications          amiodarone 200 MG Tab   Commonly known as:  PACERONE   Take 1 tablet (200 mg total) by mouth once daily.       aspirin 81 MG EC tablet   Commonly known as:  ECOTRIN   Take 1 tablet (81 mg total) by mouth once daily.       atorvastatin 40 MG tablet   Commonly known as:  LIPITOR   Take 1 tablet (40 mg total) by mouth once daily.       insulin glargine 100 unit/mL injection   Commonly known as:  LANTUS   Inject 25 Units into the skin every evening.       metoprolol tartrate 50 MG tablet   Commonly known as:  LOPRESSOR   Take 1 tablet (50 mg total) by mouth 2 (two) times daily.       nifedipine 60 MG (OSM) 24 hr tablet   Commonly known as:  PROCARDIA-XL   Take 1 tablet (60 mg total) by mouth once daily.         CONTINUE taking these medications          allopurinol 100 MG tablet   Commonly known as:  ZYLOPRIM       clopidogrel 75 mg tablet   Commonly known as:  PLAVIX       gabapentin 300 MG capsule   Commonly known as:  NEURONTIN       VITAMIN D2 50,000 unit Cap   Generic drug:  ergocalciferol         STOP taking these medications          glipiZIDE 10 MG tablet   Commonly known as:  GLUCOTROL       lisinopril 40 MG tablet   Commonly known as:  PRINIVIL,ZESTRIL       simvastatin 40 MG tablet   Commonly known as:  ZOCOR            Where to Get Your Medications      You can get these medications from any pharmacy     Bring a paper prescription for each of these medications     amiodarone 200 MG Tab    atorvastatin 40 MG tablet    insulin glargine 100 unit/mL injection     metoprolol tartrate 50 MG tablet    nifedipine 60 MG (OSM) 24 hr tablet       You don't need a prescription for these medications     aspirin 81 MG EC tablet                   _________________________________  Christos Hernández MD  02/24/2017

## 2017-02-24 NOTE — PROGRESS NOTES
Progress Note  Hospital Medicine    Admit Date: 2/13/2017   LOS: 11 days     SUBJECTIVE:     Follow-up For:  Acute ischemic left MCA stroke    Interval history: Esteban Clay is a 77 y.o. male who presents with right hemiparesis and aphasia. He was apparently found by his brother in the morning and it was noted that he had signs of a stroke. EMS was called and he was brought to [facility]. CT of the head shows Small 5mm low density area in the left thalamus poss. Small lacunar infarct. Chronic small vessel ischemic changes are noted. On arrival the pt. Was severely hypertensive (200/119). He was transferred to our facility for higher level of care.      Interval events:  - MRI/MRA showed large left cerebral infarction of the temoral and frontal cortices.   - pt. Has dysphagia and remains NPO. PEG planned.  - remains non verbal, not able to follow commands. He tracks with his eyes to some degree but does not seem to understand   - PEG placed this AM  - Creatinine shows some improvement.   - no new sx.     Review of Systems:  Review of Systems   Unable to perform ROS: Medical condition     OBJECTIVE:     Vital Signs Range (Last 24H):  Temp:  [97.5 °F (36.4 °C)-98.8 °F (37.1 °C)]   Pulse:  []   Resp:  [16-20]   BP: (125-152)/(64-87)   SpO2:  [90 %-98 %]     I & O (Last 24H):    Intake/Output Summary (Last 24 hours) at 02/24/17 0908  Last data filed at 02/24/17 0523   Gross per 24 hour   Intake          1725.84 ml   Output                0 ml   Net          1725.84 ml       Physical Exam:  Physical Exam    Medications:   albuterol-ipratropium 2.5mg-0.5mg/3mL  3 mL Nebulization Q6H    amiodarone  200 mg Oral QID (WM & HS)    aspirin  81 mg Oral Daily    atorvastatin  40 mg Oral Daily    calcitRIOL  0.25 mcg Oral Daily    famotidine (PF)  20 mg Intravenous BID    febuxostat  40 mg Per NG tube Daily    ferrous sulfate  325 mg Oral BID    insulin detemir  25 Units Subcutaneous BID     metoprolol tartrate  50 mg Oral BID    nifedipine 30 MG ORAL TR24  60 mg Oral Daily    vitamin D  2,000 Units Oral Daily     Laboratory:   CBC:     Recent Labs  Lab 02/24/17  0516   WBC 15.31*   RBC 4.88   HGB 9.2*   HCT 30.2*   *   MCV 62*   MCH 18.9*   MCHC 30.5*     CMP:     Recent Labs  Lab 02/24/17  0516   GLU 59*   CALCIUM 8.8   *   K 4.4   CO2 22*   *   BUN 74*   CREATININE 3.5*     POCT Glucose   Date Value Ref Range Status   02/24/2017 117 (H) 70 - 110 mg/dL Final   02/24/2017 67 (L) 70 - 110 mg/dL Final   02/23/2017 94 70 - 110 mg/dL Final   02/23/2017 109 70 - 110 mg/dL Final   02/23/2017 120 (H) 70 - 110 mg/dL Final   02/23/2017 207 (H) 70 - 110 mg/dL Final   02/23/2017 152 (H) 70 - 110 mg/dL Final   02/22/2017 182 (H) 70 - 110 mg/dL Final   02/22/2017 187 (H) 70 - 110 mg/dL Final   02/22/2017 219 (H) 70 - 110 mg/dL Final   02/22/2017 136 (H) 70 - 110 mg/dL Final   02/21/2017 282 (H) 70 - 110 mg/dL Final   02/21/2017 302 (H) 70 - 110 mg/dL Final   02/21/2017 292 (H) 70 - 110 mg/dL Final     Diagnostic Results:  Labs: Reviewed  X-Ray: Reviewed  MRI: Reviewed    ASSESSMENT/PLAN:     Active Hospital Problems    Diagnosis  POA    *Acute ischemic left MCA stroke [I63.512]  Yes    Diabetic retinopathy associated with type 2 diabetes mellitus [E11.319]  Yes    Acute respiratory failure with hypoxia [J96.01]  Yes    Paroxysmal atrial fibrillation [I48.0]  Yes    Hemiparesis, right [G81.91]  Yes    Oral phase dysphagia [R13.11]  Yes    Aphasia [R47.01]  Yes    HTN (hypertension), malignant [I10]  Yes    Iron deficiency anemia [D50.9]  Yes    CKD (chronic kidney disease), stage III [N18.3]  Yes    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     1: Acute ischemic left MCA stroke  - debilitating stroke with right flaccid paralysis and dysphagia  - no  significant recovery of neurologic function since admission  - PEG placed. Resume TF and oral BP.     2: DM2:   - poor control, improved  - on detemir 20 qhs and ISS, adjust  - has been non compliant in the past per sister.   Lab Results   Component Value Date    HGBA1C 9.7 (H) 02/13/2017     3: HTN:   - controlled on Procardia 60 qd, Metoprolol 50 BID    4:  CKD III with ARF  - poss. Pre-renal component  - likely underlying DN/ HTN nephropathy  - is s/p contrast exposure and ACE/Lasix.   - creatinine improving.   - continue with gently IVF and enteral hydration    5: A-fib with RVR  - has converted with Amiodarone iv and metoprolol  - has begun oral amiodarone and metorpolol  - currently in rapid A-fib, poss. Due to NPO and loss of NGT.   - will resume Amiodarone and Metoprolol now that has     6: Prophylaxis  - on heparin.     The family will not be able to come to the bedside for further discussion today. We will go ahead with rehab placement.

## 2017-02-24 NOTE — PROGRESS NOTES
[facility]  Adult Nutrition  Progress Note    SUMMARY     Recommendations  1. If pt to remain on continuous TF using Isosource 1.5, recommend increasing to 45ml to better meet EEN, maintain weight. 160ml water flush 6 times day.  This will provide 1620kcal/day, 73g pro, 190g CHO, 1785ml water-meet 98% estimated needs.  -If bolus is needed: recommend 1 (250ml) carton Diabetisource AC q 4 hrs (6Xdaily). 120ml water flush q feed This will provide 1800kcal, 90 g pro, 150gCHO, 1947ml water -meets EEN.  -(Noted pt diabetic, on non DM formula. Will monitor blood glucose. DM formula may be better suited for pt if switched to bolus.) Hold for N/V, residuals >400ml. Will adjust water flushes prn.  2. RD to monitor  Goals: provide >85%EEN via enteral feeds  Nutrition Goal Status: progressing towards goal  Communication of RD Recs: other (comment) (sticky npote)    Continuum of Care Plan  D/C planning: enteral TF       Reason for Assessment  Reason for Assessment: RD follow-up  Diagnosis: stroke/CVA  Relevent Medical History: Dm 2, CKD III, HTN   Interdisciplinary Rounds: did not attend  General Information Comments: Pt received PEG. TF formula was changed. Changed from bolus to continuous.     Nutrition Prescription Ordered  Current Diet Order: NPO  Current Nutrition Support Formula Ordered: Isosource 1.5  Current Nutrition Support Rate Ordered: 40 (ml)  Current Nutrition Support Frequency Ordered:  (330ml 5 times daily)      Evaluation of Received Nutrients/Fluid Intake  Enteral Calories (kcal): 1440  Enteral Protein (gm): 65  Enteral (Free Water) Fluid (mL): 633  Energy Calories Required: meeting needs  Protein Required: meeting needs  Fluid Required: meeting needs     Tolerance: tolerating     Nutrition Risk Screen   Nutrition Risk Screen: no indicators present    Nutrition/Diet History  Patient Reported Diet/Restrictions/Preferences: general  Typical Food/Fluid Intake: NPO  Food Preferences: NPO    Factors Affecting Nutritional Intake: NPO, difficulty/impaired swallowing       Labs/Tests/Procedures/Meds  Pertinent Labs Reviewed: reviewed ,   Pertinent Medications Reviewed: reviewed  Pertinent Medications Comments: atorvastatin, insulin (prn)    Physical Findings  Overall Physical Appearance: overweight  Tubes: gastrostomy tube  Skin: intact    Anthropometrics  Height (inches): 70 in  Weight Method: Bed Scale  Weight (kg): 76.9 kg  Ideal Body Weight (IBW), Male: 166 lb  % Ideal Body Weight, Male (lb): 102.13 lb  BMI (kg/m2): 24.33  BMI Grade: 18.5-24.9 - normal    Estimated/Assessed Needs  Weight Used For Calorie Calculations: 76.9 kg (169 lb 8.5 oz)   Height (cm): 177.8 cm  Energy Need Method: [place]-St Jeor (1644-1794kcal/day (X1.1-1.2))  RMR ([place]-St. Jeor Equation): 1495.8  Weight Used For Protein Calculations: 78.6 kg (173 lb 4.5 oz)  Protein Requirements: 63-71g/day  0.8 gm Protein (gm): 63.01 and 1.0 gm Protein (gm): 78.76  Fluid Need Method: RDA Method (1 ml per kcal or per MD)    Nutrition Diagnosis:  Problem: Difficulty swallowing  Etiology: CVA  Symptoms: NPO  Status: continues      Monitor and Evaluation  Food and Nutrient Intake: energy intake, food and beverage intake, enteral nutrition intake  Food and Nutrient Adminstration: diet order, enteral and parenteral nutrition administration  Physical Activity and Function: nutrition-related ADLs and IADLs  Anthropometric Measurements: weight, weight change  Biochemical Data, Medical Tests and Procedures: electrolyte and renal panel, gastrointestinal profile, glucose/endocrine profile, inflammatory profile, lipid profile  Nutrition-Focused Physical Findings: overall appearance    Nutrition Risk    Level of Risk: moderate    Nutrition Follow-Up    RD Follow-up?: Yes    Assessment and Plan    No new Assessment & Plan notes have been filed under this hospital service since the last note was generated.  Service: Nutrition

## 2017-02-24 NOTE — NURSING
Report called to Nurse Nicole at [facility].  IV removed without complication.  Peg tube cdi.  Patient to be transported off unit via [facility] as per case management.

## 2017-02-24 NOTE — PLAN OF CARE
Problem: Patient Care Overview  Goal: Plan of Care Review  Outcome: Ongoing (interventions implemented as appropriate)  Recommendations  1. If pt to remain on continuous TF using Isosource 1.5, recommend increasing to 45ml to better meet EEN, maintain weight. 160ml water flush 6 times day. This will provide 1620kcal/day, 73g pro, 190g CHO, 1785ml water-meet 98% estimated needs.  -If bolus is needed: recommend 1 (250ml) carton Diabetisource AC q 4 hrs (6Xdaily). 120ml water flush q feed This will provide 1800kcal, 90 g pro, 150gCHO, 1947ml water -meets EEN.  -(Noted pt diabetic, on non DM formula. Will monitor blood glucose. DM formula may be better suited for pt if switched to bolus.) Hold for N/V, residuals >400ml. Will adjust water flushes prn.  2. RD to monitor  Goals: provide >85%EEN via enteral feeds  Nutrition Goal Status: progressing towards goal  Communication of RD Recs: other (comment) (sticky npote)     Continuum of Care Plan  D/C planning: enteral TF

## 2017-02-24 NOTE — PLAN OF CARE
Problem: Physical Therapy Goal  Goal: Physical Therapy Goal  Goals to be met by: 3/14/17     Patient will increase functional independence with mobility by performin. Supine to sit with min A.  2. Sit to supine with min A.   3. Rolling R and L with min A.   4. Sitting at edge of bed >5 minutes with min A.   5. PT will assess sit<>stand.    Outcome: Ongoing (interventions implemented as appropriate)  Pt continues to progress slowly towards goals. Please see progress note for details, POC, and recommendations.

## 2017-02-24 NOTE — PROGRESS NOTES
Renal Progress Note    Admit Date: 2/13/2017   LOS: 11 days     SUBJECTIVE:     Discussed with treatment team.  Pt remains aphasic following massive CVA.  Renal fxn slowly improving.     Scheduled Meds:   albuterol-ipratropium 2.5mg-0.5mg/3mL  3 mL Nebulization Q6H    amiodarone  200 mg Oral QID (WM & HS)    aspirin  81 mg Oral Daily    atorvastatin  40 mg Oral Daily    calcitRIOL  0.25 mcg Oral Daily    famotidine (PF)  20 mg Intravenous BID    febuxostat  40 mg Per NG tube Daily    ferrous sulfate  325 mg Oral BID    insulin detemir  25 Units Subcutaneous BID    metoprolol tartrate  50 mg Oral BID    nifedipine 30 MG ORAL TR24  60 mg Oral Daily    vitamin D  2,000 Units Oral Daily       OBJECTIVE:     Vital Signs Range (Last 24H):  Temp:  [97.5 °F (36.4 °C)-98.8 °F (37.1 °C)]   Pulse:  []   Resp:  [16-20]   BP: (125-152)/(64-87)   SpO2:  [90 %-98 %]     I & O (Last 24H):    Intake/Output Summary (Last 24 hours) at 02/24/17 1119  Last data filed at 02/24/17 1000   Gross per 24 hour   Intake          2125.84 ml   Output                0 ml   Net          2125.84 ml       Physical Exam:  Well developed, (Aphasic) but awake  Neck supple no LAD  Irr Irr HR in 60's No rubs +S4  Course BS upper AW .  Abd soft NTND +BS, obese, PEG noted.  LUE severely deformed with thumb and 3rd digit remaining. Healed scars present  Rided flaccid paralysis  No LE edema          Laboratory:  CBC:     Recent Labs  Lab 02/24/17  0516   WBC 15.31*   RBC 4.88   HGB 9.2*   HCT 30.2*   *   MCV 62*   MCH 18.9*   MCHC 30.5*     BMP:     Recent Labs  Lab 02/24/17  0516   GLU 59*   *   K 4.4   *   CO2 22*   BUN 74*   CREATININE 3.5*   CALCIUM 8.8       Lab Results   Component Value Date    URICACID 10.3 (H) 02/19/2017     ASSESSMENT/PLAN:        76 YO male with massive acute ischemic left MCA stroke and hypertensive emergency. 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          Slowly improving Non-Oliguric SONYA on CKD3/4-  baseline unclear. He did undergo a dye load with the MRA on admission which might have induced a ADDI.  Was also on ACE and given few doses of lasix.  Proteinuria about 1.4 grams by ratio.  Suspect underlying CKD due to combo of hypertension and DM based on history (has +retinopathy). Serology neg, US shows chronic MRDz.. No need for RRT yet.  Would not be an ideal candidate for RRT.  Renally dose meds, avoid nephrotoxins, and monitor I/O's closely.        HTN: As now. No ACE, ARB, or diuretic.        Afib with RVR, new onset: Per Cards. On amiodarone.        Protein-Calorie malnutrition: PEG placed.       DM: Per primary.  + History of retinopathy so most likely underlying Diabetic nephropathy as well.        Hyperuricemia: changed allopurinol to Uloric.       Secondary HPT and Vit D defic:  Started oral Vit D3 and Calcitriol      Anemia:  Iron deficient. Likely of CKD.  Started oral iron for now.        Mild Hypernatremia/Hyperchloridemia (E87.0):  free water via PEG.         See above recs.  Discussed with treatment team.  Poor overall prognosis with severity of CVA and co-morbidities.   Need to consider DNR/Hospice  Cleared from Renal for Rehab/NH/etc.

## 2017-02-24 NOTE — PROGRESS NOTES
This patient had an uncomplicated PEG placement yesterday. No acute visceral signs or that of blood loss or peritonitis.  T<100 HR 88  Anicteric  Chest few upper airway noises  Abdomen soft active sounds no mass. PEG clean no signs of infection or peritonioitis  May start tube feeding, elevate head of bed.  Please call us if needed. Thanks.

## 2017-02-24 NOTE — PLAN OF CARE
Patient Name: Esteban Goins  MRN: [id number]  Patient Class: IP- Inpatient   Admission Date: 2/13/2017  Length of Stay:  11 days  Attending Physician: Cristy Cullen MD/Christos Hernández MD  Primary Care Provider: [facility]          Discharge Planning:  Chart reviewed  Care plan discussed  Mr Goins is assigned room 315  PCG for Mr Goins is his sister, Petra, ([phone]).      Discussed care plan with treatment team  Discussed care plan with the attending Dr Hernández  Current dispo -Mr Goins has been denied by many rehabs locally and in the [facility].  New Plan is SNF.  Per sisters request, she prefers a SNF near her home in [facility].    Case management to follow with you  Consults following are: neurology, PT, OT, ST, case mgt, GI, cardiology.

## 2017-02-24 NOTE — PROGRESS NOTES
[facility]  Cardiology  Progress Note    Patient Name: Esteban Goins  MRN: [id number]  Admission Date: 2/13/2017  Hospital Length of Stay: 11 days  Code Status: Full Code   Attending Physician: Christos Hernández MD   Primary Care Physician: [facility]  Expected Discharge Date:   Principal Problem:Acute ischemic left MCA stroke    Subjective:     Brief HPI:    78 yo man with hypertension. Presents after a cerebrovascular accident with right sided weakness on 2/13/2017. On 2/14/2017 he went into atrial fibrillation and was begun on amiodarone for rate control.    Hospital Course:     2/14/2017: Began amiodarone.    2/21/2017: Back into sinus.    2/22/2017: Back in atrial fibrillation.    2/23/2017: Mostly sinus rhythm.    Interval History:     Miserable.    Review of Systems   Unable to perform ROS: patient nonverbal     Objective:     Vital Signs (Most Recent):  Temp: 97.6 °F (36.4 °C) (02/24/17 1500)  Pulse: 73 (02/24/17 1500)  Resp: 20 (02/24/17 1500)  BP: (!) 148/70 (02/24/17 1500)  SpO2: (!) 91 % (02/24/17 1500) Vital Signs (24h Range):  Temp:  [97 °F (36.1 °C)-98.8 °F (37.1 °C)] 97.6 °F (36.4 °C)  Pulse:  [] 73  Resp:  [16-22] 20  SpO2:  [91 %-95 %] 91 %  BP: (125-152)/(56-87) 148/70     Weight: 76.9 kg (169 lb 8.5 oz)  Body mass index is 24.33 kg/(m^2).    SpO2: (!) 91 %  O2 Device (Oxygen Therapy): nasal cannula      Intake/Output Summary (Last 24 hours) at 02/24/17 1710  Last data filed at 02/24/17 1400   Gross per 24 hour   Intake          2437.51 ml   Output                0 ml   Net          2437.51 ml       Lines/Drains/Airways     Drain                 NG/OG Tube 02/13/17 1455 nasogastric Right nostril 3 days         Urethral Catheter 02/13/17 1015 3 days          Peripheral Intravenous Line                 Peripheral IV - Single Lumen 02/14/17 1455 Right Antecubital 2 days         Peripheral IV - Single Lumen 02/15/17 Right Forearm 1 day                 Physical Exam   Cardiovascular: Normal rate and regular rhythm.    Pulmonary/Chest: Effort normal. He has rhonchi.   Abdominal: Normal appearance.     Current Medications:     albuterol-ipratropium 2.5mg-0.5mg/3mL  3 mL Nebulization Q6H    amiodarone  200 mg Oral QID (WM & HS)    aspirin  81 mg Oral Daily    atorvastatin  40 mg Oral Daily    calcitRIOL  0.25 mcg Oral Daily    famotidine (PF)  20 mg Intravenous BID    febuxostat  40 mg Per NG tube Daily    ferrous sulfate  325 mg Oral BID    insulin detemir  25 Units Subcutaneous BID    metoprolol tartrate  50 mg Oral BID    nifedipine 30 MG ORAL TR24  60 mg Oral Daily    vitamin D  2,000 Units Oral Daily     Current Laboratory Results:    Recent Results (from the past 24 hour(s))   POCT glucose    Collection Time: 02/23/17  5:58 PM   Result Value Ref Range    POCT Glucose 120 (H) 70 - 110 mg/dL   POCT glucose    Collection Time: 02/23/17  9:02 PM   Result Value Ref Range    POCT Glucose 109 70 - 110 mg/dL   POCT glucose    Collection Time: 02/23/17 11:45 PM   Result Value Ref Range    POCT Glucose 94 70 - 110 mg/dL   CBC auto differential    Collection Time: 02/24/17  5:16 AM   Result Value Ref Range    WBC 15.31 (H) 3.90 - 12.70 K/uL    RBC 4.88 4.60 - 6.20 M/uL    Hemoglobin 9.2 (L) 14.0 - 18.0 g/dL    Hematocrit 30.2 (L) 40.0 - 54.0 %    MCV 62 (L) 82 - 98 fL    MCH 18.9 (L) 27.0 - 31.0 pg    MCHC 30.5 (L) 32.0 - 36.0 %    RDW 14.7 (H) 11.5 - 14.5 %    Platelets 389 (H) 150 - 350 K/uL    MPV 11.3 9.2 - 12.9 fL    Gran # 11.1 (H) 1.8 - 7.7 K/uL    Lymph # 2.2 1.0 - 4.8 K/uL    Mono # 1.3 (H) 0.3 - 1.0 K/uL    Eos # 0.6 (H) 0.0 - 0.5 K/uL    Baso # 0.03 0.00 - 0.20 K/uL    nRBC 0 0 /100 WBC    Gran% 73.4 (H) 38.0 - 73.0 %    Lymph% 14.4 (L) 18.0 - 48.0 %    Mono% 8.2 4.0 - 15.0 %    Eosinophil% 3.8 0.0 - 8.0 %    Basophil% 0.2 0.0 - 1.9 %    Platelet Estimate Increased (A)     Aniso Slight     Poik Slight     Ovalocytes Occasional     Target Cells  Occasional     Large/Giant Platelets Present     Fragmented Cells Occasional     Differential Method Automated    Basic metabolic panel    Collection Time: 02/24/17  5:16 AM   Result Value Ref Range    Sodium 149 (H) 136 - 145 mmol/L    Potassium 4.4 3.5 - 5.1 mmol/L    Chloride 117 (H) 95 - 110 mmol/L    CO2 22 (L) 23 - 29 mmol/L    Glucose 59 (L) 70 - 110 mg/dL    BUN, Bld 74 (H) 8 - 23 mg/dL    Creatinine 3.5 (H) 0.5 - 1.4 mg/dL    Calcium 8.8 8.7 - 10.5 mg/dL    Anion Gap 10 8 - 16 mmol/L    eGFR if African American 18 (A) >60 mL/min/1.73 m^2    eGFR if non African American 16 (A) >60 mL/min/1.73 m^2   POCT glucose    Collection Time: 02/24/17  5:41 AM   Result Value Ref Range    POCT Glucose 67 (L) 70 - 110 mg/dL   POCT glucose    Collection Time: 02/24/17  6:59 AM   Result Value Ref Range    POCT Glucose 117 (H) 70 - 110 mg/dL     Current Imaging Results:    Imaging Results         X-Ray Chest 1 View (Final result) Result time:  02/22/17 11:19:45    Final result by Maryam Mcqueen MD (02/22/17 11:19:45)    Impression:      Enteric tube tip overlying the gastric bubble with sidehole poorly seen      Electronically signed by: MARYAM MCQUEEN  Date:     02/22/17  Time:    11:19     Narrative:    CLINICAL HISTORY:  NG tube placement    TECHNIQUE: Single view portable frontal radiograph of the chest    COMPARISON: Chest radiograph 2/21/17      FINDINGS:  Support devices: Enteric tube tip overlies the gastric bubble.  Side hole is not able to be visualized due to attenuation from overlying soft tissues.    Chest: Borderline enlargement of the cardiac silhouette is unchanged, exaggerated by portable technique and body habitus.  Lungs are well aerated and clear.  No pleural effusion or pneumothorax.    Upper Abdomen: Normal.    Other: N/A.            X-Ray Chest 1 View (Final result) Result time:  02/21/17 13:41:13    Final result by Maykel Diamond MD (02/21/17 13:41:13)    Impression:        Nasogastric tube appear in  stable position. Multiple overlying cardiac monitoring leads.    No apparent change in cardiopulmonary status. Mild cardiomegaly with small bilateral pleural effusions and bibasilar atelectasis. No new focal consolidation.         Electronically signed by: KAILEE JUSTICE MD  Date:     02/21/17  Time:    13:41     Narrative:    XR Chest    Procedure time: 02/21/17 12:52:47    Accession #: [id number]    CLINICAL INDICATION: 77 year old M with dyspnea     TECHNIQUE: Single frontal portable chest radiograph.    COMPARISON:  02/18/2017.    FINDINGS            US Retroperitoneal Complete (Kidney and (Final result) Result time:  02/20/17 01:05:29    Final result by Ryan George MD (02/20/17 01:05:29)    Impression:        Sonographic features suggestive of chronic medical renal disease. No evidence of hydronephrosis.      Electronically signed by: RYAN GEORGE  Date:     02/20/17  Time:    01:05     Narrative:    Time of Procedure: 02/19/17 19:41:31  Accession # [id number]    Reason for study: Acute kidney injury     Comparison: None    Technique: Routine renal ultrasound was performed.    Findings:   Please note examination is technically limited secondary to patient body habitus and inability to breath-hold.    The kidneys measure 11.0 cm on the right and 10.3 cm on the left. There is bilateral cortical thinning, decreased corticomedullary distinction, and increased cortical echogenicity. There is no evidence of hydronephrosis. There are small bilateral simple appearing renal cysts.Perfusion to the kidneys is decreased. Resistive indices are elevated and as follow: 0.80 on the right and 0.86 on the left. The urinary bladder is decompressed around a Marcial catheter.            X-Ray Chest AP Portable (Final result) Result time:  02/18/17 01:45:09    Final result by Syd Kingsley MD (02/18/17 01:45:09)    Impression:       Enteric tube tip below left hemidiaphragm in appropriate position.    Cardiomegaly with interval  increased attenuation of the pulmonary parenchyma and associated stable small bilateral pleural effusions.  The findings are suggestive of probable worsening CHF.  Clinical correlation and followup are suggested.    Nonspecific lucency in the right cardiophrenic angle.                  Electronically signed by: GAYATHRI MEDINA MD  Date:     02/18/17  Time:    01:45     Narrative:    Exam: [id number]  02/18/17  01:06:46 [id number] ([facility]) : XR CHEST AP PORTABLE    Technique:    Single frontal chest x-ray.    Comparison:    02/16/2017.    Findings:      Enteric tube courses below the left hemidiaphragm with its tip in the right upper abdominal quadrant in the region of the gastric antrum.  Previous monitoring EKG leads have been removed.      The trachea is unremarkable.There is stable enlargement of the cardiomediastinal silhouette.  There is no evidence of free air beneath the hemidiaphragms.  There is stable obscuration of both costophrenic angles.  There is no evidence of a pneumothorax.  There is nonspecific lucency in the right cardiophrenic angle.   There is increased attenuation of the pulmonary parenchyma.  There are degenerative changes in the osseous structures.            X-Ray Chest 1 View (Final result) Result time:  02/16/17 09:18:52    Final result by Jack Holly Jr., MD (02/16/17 09:18:52)    Narrative:    Chest single view compared to February 14.  NG tube directed toward the gastric antrum.  Increased opacification at the bases likely some pleural fluid left greater than right.  Heart remains enlarged.  No pneumothorax.    Impression NG tube in satisfactory position.      Electronically signed by: JACK HOLLY MD  Date:     02/16/17  Time:    09:18             X-Ray Chest AP Portable (Final result) Result time:  02/14/17 16:53:12    Final result by Anson Garcia DO (02/14/17 16:53:12)    Impression:        See Above       Electronically signed by: ANSON GARCIA DO  Date:     02/14/17  Time:    16:53      Narrative:    Single portable frontal view of the chest    Comparison: None    Results: Ill-defined right basilar opacity concerning for effusion with atelectasis. There is no large pneumothorax. Atherosclerotic aorta. Nasogastric tube identified with looping configuration the epigastric region may be in the gastric cardia or distal esophagus. Clinical correlation and advancement advised. No large lung consolidation.            MRI Brain Without Contrast (Final result) Result time:  02/13/17 21:01:31    Final result by Jose G Amor MD (02/13/17 21:01:31)    Impression:        Occlusion of the left superior division of the M2 segment of the MCA with resulting large left cerebral infarct involving the left parietal, temporal, and frontal cortices.      Electronically signed by: JOSE G AMOR MD  Date:     02/13/17  Time:    21:01     Narrative:    MRI brain, MRA head,     02/13/17 19:50:00    Accession# [id number]      CLINICAL INDICATION: 77 year old M with stroke     TECHNIQUE: Multiplanar multisequence MR imaging of the brain was performed without intravenous contrast.  Examination performed in conjunction with a 3-D time-of-flight noncontrast MR angiogram of the intracranial vasculature.  Multiple MIP reconstructions were performed.    COMPARISON: No priors.    FINDINGS:  There is a large infarct of the left parietal lobe extending anteriorly to involve the very posterior sulci of the left frontal and temporal lobes. Otherwise, there are a few scattered T2 hyperintensities in the periventricular white matter. Possible remote infarct of the right centrum semi-ovale. The no intracranial blood products. No significant mass effect. No midline shift. Visualized portions of the calvarium and sinuses show no focal abnormality.    MRA:   Visualized portions of the carotid arteries and vertebral arteries are normal in course and caliber. Basilar artery is widely patent. Posterior circulation is intact. Bilateral  posterior communicating arteries are intact.    There is mild irregular stenoses of the bilateral MCA. There is occlusion of the superior branch of the M2 segments of the MCA.            MRA Brain (Final result) Result time:  02/13/17 21:01:31    Final result by Jose G Amor MD (02/13/17 21:01:31)    Impression:        Occlusion of the left superior division of the M2 segment of the MCA with resulting large left cerebral infarct involving the left parietal, temporal, and frontal cortices.      Electronically signed by: JOSE G AMOR MD  Date:     02/13/17  Time:    21:01     Narrative:    MRI brain, MRA head,     02/13/17 19:50:00    Accession# [id number]      CLINICAL INDICATION: 77 year old M with stroke     TECHNIQUE: Multiplanar multisequence MR imaging of the brain was performed without intravenous contrast.  Examination performed in conjunction with a 3-D time-of-flight noncontrast MR angiogram of the intracranial vasculature.  Multiple MIP reconstructions were performed.    COMPARISON: No priors.    FINDINGS:  There is a large infarct of the left parietal lobe extending anteriorly to involve the very posterior sulci of the left frontal and temporal lobes. Otherwise, there are a few scattered T2 hyperintensities in the periventricular white matter. Possible remote infarct of the right centrum semi-ovale. The no intracranial blood products. No significant mass effect. No midline shift. Visualized portions of the calvarium and sinuses show no focal abnormality.    MRA:   Visualized portions of the carotid arteries and vertebral arteries are normal in course and caliber. Basilar artery is widely patent. Posterior circulation is intact. Bilateral posterior communicating arteries are intact.    There is mild irregular stenoses of the bilateral MCA. There is occlusion of the superior branch of the M2 segments of the MCA.            X-Ray Abdomen AP 1 View (Final result) Result time:  02/13/17 17:39:18     Final result by Rosy Hicks MD (02/13/17 17:39:18)    Impression:     As above.      Electronically signed by: ROSY HICKS MD, MD  Date:     02/13/17  Time:    17:39     Narrative:    Comparison: Abdominal radiograph earlier same day at 1610 hrs.    Findings: Single AP portable semiupright view of the mid to upper abdomen which includes the lower chest. The lower abdomen and pelvis are not included in field-of-view. Enteric tube appears to have been retracted with port and tip projected over the medial left upper quadrant, likely within the proximal stomach. Otherwise, no significant interval change of the included lower chest and upper abdomen.            X-Ray Abdomen AP 1 View (Final result) Result time:  02/13/17 16:45:53    Final result by Anson Monreal DO (02/13/17 16:45:53)    Impression:     See Above .      Electronically signed by: ANSON MONREAL DO  Date:     02/13/17  Time:    16:45     Narrative:    Technique: Supine view of the upper abdomen    Comparison: 02/13/2017 at 1606    Results:Interval placement of a feeding tube with tip projected over the right upper abdomen in the region of the expected gastric pylorus/proximal duodenum. External monitors overlie the lower thorax and left mid abdomen. Continued few scattered gas-filled loops of bowel within the upper abdomen.            X-Ray Abdomen AP 1 View (Final result) Result time:  02/13/17 16:41:56    Final result by Anson Monreal DO (02/13/17 16:41:56)    Impression:     See Above .      Electronically signed by: ANSON MONREAL DO  Date:     02/13/17  Time:    16:41     Narrative:    Technique: Supine view the abdomen    Comparison: None    Results:Scattered gas-filled loops of bowel within the abdomen with gas and fecal material projected over the visualized colon. Overall nonspecific bowel gas pattern. No feeding tube projected over the visualized mediastinum or upper abdomen. Clinical correlation advised. Multiple external leads project  over the lower thorax.              Assessment and Plan:     Problem List:    Active Diagnoses:    Diagnosis Date Noted POA    PRINCIPAL PROBLEM:  Acute ischemic left MCA stroke [I63.512] 02/13/2017 Yes    Diabetic retinopathy associated with type 2 diabetes mellitus [E11.319] 02/20/2017 Yes    Acute respiratory failure with hypoxia [J96.01] 02/18/2017 Yes    Paroxysmal atrial fibrillation [I48.0] 02/14/2017 Yes    Hemiparesis, right [G81.91] 02/13/2017 Yes    Oral phase dysphagia [R13.11] 02/13/2017 Yes    Aphasia [R47.01] 02/13/2017 Yes    HTN (hypertension), malignant [I10] 02/13/2017 Yes    Iron deficiency anemia [D50.9] 02/13/2017 Yes    CKD (chronic kidney disease), stage III [N18.3] 02/13/2017 Yes    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 Atrial Fibrillation              2/14/2017: Diagnosed with paroxysmal atrial fibrillation.              [id number] (HA2S2V)              2/14/2017: Began amiodarone iv and metoprolol.              On amiodarone 200 mg Q6 and metoprolol 50 mg Q8.   2/21/2017: Into sinus rhythm.   2/22/2017: Back in atrial fibrillation.   Again in sinus.   Reduce amiodarone to 200 mg Q12 and metoprolol to 50 mg Q12 omn discharge.      2. Stroke Prevention              On aspirin 81 mg Q24.   Heparin 5,000 U sc Q8.              Would not favor anticoagulation at present.     3. Cerebrovascular Accident              Imaging tests reveal lacunar infarct.        VTE Risk Mitigation         Ordered     Medium Risk of VTE  Once      02/13/17 1421     Place sequential compression device  Until discontinued      02/13/17 1421          Anticipated Disposition: Long Term Care    Discharge Needs: Not clear at the present time.    Nicanor Freitas MD  Cardiology  [facility]

## 2017-02-24 NOTE — PLAN OF CARE
02/24/17 1542   Final Note   Assessment Type Final Discharge Note   Discharge Disposition SNF   Discharge planning education complete? Yes   What phone number can be called within the next 1-3 days to see how you are doing after discharge? (sister)   Hospital Follow Up  Appt(s) scheduled? Yes   Discharge plans and expectations educations in teach back method with documentation complete? Yes   Offered [facility] -- Bedside Delivery? n/a   Discharge/Hospital Encounter Summary to ([facility]) PCP Yes   Referral to Outpatient Case Management complete? n/a   Referral to / orders for Home Health Complete? n/a   30 day supply of medicines given at discharge, if documented non-compliance / non-adherence? n/a   Any social issues identified prior to discharge? Yes   Did you assess the readiness or willingness of the family or caregiver to support self management of care? Yes

## 2017-02-24 NOTE — DISCHARGE SUMMARY
Discharge Summary  Hospital Medicine      Admit Date: 2/13/2017    Discharge Date and Time: 2/24/2017 1:23 PM    Discharge Attending Physician: Christos Hernández MD     Diagnoses:  Active Hospital Problems    Diagnosis  POA    *Acute ischemic left MCA stroke [I63.512]  Yes    Diabetic retinopathy associated with type 2 diabetes mellitus [E11.319]  Yes    Acute respiratory failure with hypoxia [J96.01]  Yes    Paroxysmal atrial fibrillation [I48.0]  Yes    Hemiparesis, right [G81.91]  Yes    Oral phase dysphagia [R13.11]  Yes    Aphasia [R47.01]  Yes    HTN (hypertension), malignant [I10]  Yes    Iron deficiency anemia [D50.9]  Yes    CKD (chronic kidney disease), stage III [N18.3]  Yes    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       Discharged Condition: stable    Hospital Course: Esteban Clay is a 77 y.o. male who presents with right hemiparesis and aphasia. He was apparently found by his brother in the morning and it was noted that he had signs of a stroke. EMS was called and he was brought to [facility]. CT of the head shows Small 5mm low density area in the left thalamus poss. Small lacunar infarct. Chronic small vessel ischemic changes are noted. On arrival the pt. Was severely hypertensive (200/119). He was transferred to our facility for higher level of care.       Interval events:  - MRI/MRA showed large left cerebral infarction of the temoral and frontal cortices.   - pt. Has dysphagia and remains NPO. PEG placed 2/23/17  - remains non verbal, not able to follow commands. He tracks with his eyes to some degree but does not seem to understand     1: Acute ischemic left MCA stroke  - debilitating stroke with right flaccid paralysis and dysphagia  - no significant recovery of neurologic function since admission  - PEG placed.   - anticipate SNF  today      2: DM2:   - poor control on admit, overall improved  - on detemir qhs and ISS, adjust  - has been non compliant in the past per sister.         Lab Results   Component Value Date     HGBA1C 9.7 (H) 02/13/2017      3: HTN:   - controlled on Procardia 60 qd, Metoprolol 50 BID     4: CKD III with ARF  - poss. Pre-renal component  - likely underlying DN/ HTN nephropathy  - is s/p contrast exposure and ACE/Lasix.   - creatinine improved now stable at 3.5 creatinine.   - enteral feeding and hydration  - no nephrotoxins or ACE/ARB  - no need for HD yet, but needs close observation and poss. Nephrology referral for management.      5: A-fib with RVR  - has converted with Amiodarone iv and metoprolol  - has begun oral amiodarone and metorpolol, now in SR rate controlled  - anticoagulation was not favored in this pt. Case with at this point.      Consults: Cardiology, Nephrology and Neurology    Significant Diagnostic Studies:   CBC:     Recent Labs  Lab 02/24/17  0516   WBC 15.31*   RBC 4.88   HGB 9.2*   HCT 30.2*   *   MCV 62*   MCH 18.9*   MCHC 30.5*       CMP:     Recent Labs  Lab 02/24/17  0516   GLU 59*   CALCIUM 8.8   *   K 4.4   CO2 22*   *   BUN 74*   CREATININE 3.5*       Special Treatments/Procedures: PEG placement    Disposition: Skilled Nursing Facility    Diet: PEG feeding    Activity: as tolerated    Patient Instructions:   Reconciled Home Medications:   Current Discharge Medication List      START taking these medications    Details   amiodarone (PACERONE) 200 MG Tab Take 1 tablet (200 mg total) by mouth once daily.  Qty: 60 tablet, Refills: 1      aspirin (ECOTRIN) 81 MG EC tablet Take 1 tablet (81 mg total) by mouth once daily.  Refills: 0      atorvastatin (LIPITOR) 40 MG tablet Take 1 tablet (40 mg total) by mouth once daily.  Qty: 30 tablet, Refills: 1      insulin glargine (LANTUS) 100 unit/mL injection Inject 25 Units into the skin every evening.  Qty: 7.5 mL, Refills: 11       metoprolol tartrate (LOPRESSOR) 50 MG tablet Take 1 tablet (50 mg total) by mouth 2 (two) times daily.  Qty: 60 tablet, Refills: 1      nifedipine 30 MG ORAL TR24 (PROCARDIA-XL) 60 MG (OSM) 24 hr tablet Take 1 tablet (60 mg total) by mouth once daily.  Qty: 30 tablet, Refills: 0         CONTINUE these medications which have NOT CHANGED    Details   allopurinol (ZYLOPRIM) 100 MG tablet Take 200 mg by mouth once daily.      clopidogrel (PLAVIX) 75 mg tablet Take 75 mg by mouth once daily.      ergocalciferol (VITAMIN D2) 50,000 unit Cap Take 50,000 Units by mouth every 7 days.      gabapentin (NEURONTIN) 300 MG capsule Take 300 mg by mouth 3 (three) times daily.         STOP taking these medications       glipiZIDE (GLUCOTROL) 10 MG tablet Comments:   Reason for Stopping:         lisinopril (PRINIVIL,ZESTRIL) 40 MG tablet Comments:   Reason for Stopping:         simvastatin (ZOCOR) 40 MG tablet Comments:   Reason for Stopping:                 Discharge Procedure Orders  Activity as tolerated     Call MD for:  temperature >100.4     Call MD for:  severe uncontrolled pain     Call MD for:  difficulty breathing or increased cough     Call MD for:  severe persistent headache     Call MD for:  increased confusion or weakness     No dressing needed         Follow-up Information     Follow up with [facility] [place] In 2 weeks.    Contact information:    [address] [place] [address]  [place] [address] [phone]          Follow up with Nicanor Freitas MD In 2 weeks.    Specialty:  Cardiology    Contact information:    [place]  [facility] [id number] [phone]

## 2017-02-24 NOTE — PROGRESS NOTES
[facility]  Cardiology  Progress Note    Patient Name: Esteban Goins  MRN: [id number]  Admission Date: 2/13/2017  Hospital Length of Stay: 10 days  Code Status: Full Code   Attending Physician: Christos Hernández MD   Primary Care Physician: [facility] [place]  Expected Discharge Date:   Principal Problem:Acute ischemic left MCA stroke    Subjective:     Brief HPI:    76 yo man with hypertension. Presents after a cerebrovascular accident with right sided weakness on 2/13/2017. On 2/14/2017 he went into atrial fibrillation and was begun on amiodarone for rate control.    Hospital Course:     2/14/2017: Began amiodarone.    2/21/2017: Back into sinus.    2/22/2017: Back in atrial fibrillation.    2/23/2017: Mostly sinus rhythm.    Interval History:     Miserable.    Review of Systems   Unable to perform ROS: patient nonverbal     Objective:     Vital Signs (Most Recent):  Temp: 97.5 °F (36.4 °C) (02/23/17 1527)  Pulse: 73 (02/23/17 1527)  Resp: 18 (02/23/17 1527)  BP: 137/64 (02/23/17 1527)  SpO2: 98 % (02/23/17 1527) Vital Signs (24h Range):  Temp:  [96.9 °F (36.1 °C)-98.8 °F (37.1 °C)] 97.5 °F (36.4 °C)  Pulse:  [] 73  Resp:  [18-90] 18  SpO2:  [90 %-98 %] 98 %  BP: (131-164)/(64-80) 137/64     Weight: 76.9 kg (169 lb 8.5 oz)  Body mass index is 24.33 kg/(m^2).    SpO2: 98 %  O2 Device (Oxygen Therapy): nasal cannula      Intake/Output Summary (Last 24 hours) at 02/23/17 1810  Last data filed at 02/23/17 1721   Gross per 24 hour   Intake          1224.17 ml   Output                0 ml   Net          1224.17 ml       Lines/Drains/Airways     Drain                 NG/OG Tube 02/13/17 1455 nasogastric Right nostril 3 days         Urethral Catheter 02/13/17 1015 3 days          Peripheral Intravenous Line                 Peripheral IV - Single Lumen 02/14/17 1455 Right Antecubital 2 days         Peripheral IV - Single Lumen 02/15/17 Right Forearm 1 day                Physical  Exam   Constitutional: No distress.   Neck: No JVD present. No thyromegaly present.   Cardiovascular: Normal rate, regular rhythm, S1 normal and S2 normal.  Exam reveals gallop and S4. Exam reveals no S3.    Pulmonary/Chest: Effort normal. He has rhonchi.   Abdominal: Normal appearance. There is no tenderness.   Skin: Skin is warm and dry.     Current Medications:     albuterol-ipratropium 2.5mg-0.5mg/3mL  3 mL Nebulization Q6H    amiodarone  200 mg Oral QID (WM & HS)    aspirin  81 mg Oral Daily    atorvastatin  40 mg Oral Daily    calcitRIOL  0.25 mcg Oral Daily    famotidine (PF)  20 mg Intravenous BID    febuxostat  40 mg Per NG tube Daily    ferrous sulfate  325 mg Oral BID    insulin detemir  25 Units Subcutaneous BID    metoprolol tartrate  50 mg Oral BID    nifedipine 30 MG ORAL TR24  60 mg Oral Daily    vitamin D  2,000 Units Oral Daily     Current Laboratory Results:    Recent Results (from the past 24 hour(s))   POCT glucose    Collection Time: 02/22/17 11:13 PM   Result Value Ref Range    POCT Glucose 182 (H) 70 - 110 mg/dL   CBC auto differential    Collection Time: 02/23/17  5:37 AM   Result Value Ref Range    WBC 16.63 (H) 3.90 - 12.70 K/uL    RBC 5.18 4.60 - 6.20 M/uL    Hemoglobin 9.8 (L) 14.0 - 18.0 g/dL    Hematocrit 32.0 (L) 40.0 - 54.0 %    MCV 62 (L) 82 - 98 fL    MCH 18.9 (L) 27.0 - 31.0 pg    MCHC 30.6 (L) 32.0 - 36.0 %    RDW 14.6 (H) 11.5 - 14.5 %    Platelets 389 (H) 150 - 350 K/uL    MPV 11.0 9.2 - 12.9 fL    Gran # 13.3 (H) 1.8 - 7.7 K/uL    Lymph # 1.9 1.0 - 4.8 K/uL    Mono # 1.2 (H) 0.3 - 1.0 K/uL    Eos # 0.2 0.0 - 0.5 K/uL    Baso # 0.03 0.00 - 0.20 K/uL    Gran% 80.3 (H) 38.0 - 73.0 %    Lymph% 11.4 (L) 18.0 - 48.0 %    Mono% 6.9 4.0 - 15.0 %    Eosinophil% 1.2 0.0 - 8.0 %    Basophil% 0.2 0.0 - 1.9 %    Platelet Estimate Increased (A)     Aniso Slight     Poik Slight     Ovalocytes Occasional     Target Cells Occasional     Large/Giant Platelets Present     Fragmented  Cells Occasional     Differential Method Automated    Basic metabolic panel    Collection Time: 02/23/17  5:37 AM   Result Value Ref Range    Sodium 147 (H) 136 - 145 mmol/L    Potassium 4.5 3.5 - 5.1 mmol/L    Chloride 114 (H) 95 - 110 mmol/L    CO2 21 (L) 23 - 29 mmol/L    Glucose 137 (H) 70 - 110 mg/dL    BUN, Bld 81 (H) 8 - 23 mg/dL    Creatinine 3.6 (H) 0.5 - 1.4 mg/dL    Calcium 8.9 8.7 - 10.5 mg/dL    Anion Gap 12 8 - 16 mmol/L    eGFR if African American 18 (A) >60 mL/min/1.73 m^2    eGFR if non African American 15 (A) >60 mL/min/1.73 m^2   Protime-INR    Collection Time: 02/23/17  5:37 AM   Result Value Ref Range    Prothrombin Time 11.3 9.0 - 12.5 sec    INR 1.0 0.8 - 1.2   POCT glucose    Collection Time: 02/23/17  5:43 AM   Result Value Ref Range    POCT Glucose 152 (H) 70 - 110 mg/dL   POCT glucose    Collection Time: 02/23/17 11:37 AM   Result Value Ref Range    POCT Glucose 207 (H) 70 - 110 mg/dL     Current Imaging Results:    Imaging Results         X-Ray Chest 1 View (Final result) Result time:  02/22/17 11:19:45    Final result by Maryam Mcqueen MD (02/22/17 11:19:45)    Impression:      Enteric tube tip overlying the gastric bubble with sidehole poorly seen      Electronically signed by: MARYAM MCQUEEN  Date:     02/22/17  Time:    11:19     Narrative:    CLINICAL HISTORY:  NG tube placement    TECHNIQUE: Single view portable frontal radiograph of the chest    COMPARISON: Chest radiograph 2/21/17      FINDINGS:  Support devices: Enteric tube tip overlies the gastric bubble.  Side hole is not able to be visualized due to attenuation from overlying soft tissues.    Chest: Borderline enlargement of the cardiac silhouette is unchanged, exaggerated by portable technique and body habitus.  Lungs are well aerated and clear.  No pleural effusion or pneumothorax.    Upper Abdomen: Normal.    Other: N/A.            X-Ray Chest 1 View (Final result) Result time:  02/21/17 13:41:13    Final result by Maykel SEGUNDO  MD Lobo (02/21/17 13:41:13)    Impression:        Nasogastric tube appear in stable position. Multiple overlying cardiac monitoring leads.    No apparent change in cardiopulmonary status. Mild cardiomegaly with small bilateral pleural effusions and bibasilar atelectasis. No new focal consolidation.         Electronically signed by: KAILEE JUSTICE MD  Date:     02/21/17  Time:    13:41     Narrative:    XR Chest    Procedure time: 02/21/17 12:52:47    Accession #: [id number]    CLINICAL INDICATION: 77 year old M with dyspnea     TECHNIQUE: Single frontal portable chest radiograph.    COMPARISON:  02/18/2017.    FINDINGS            US Retroperitoneal Complete (Kidney and (Final result) Result time:  02/20/17 01:05:29    Final result by Rayn George MD (02/20/17 01:05:29)    Impression:        Sonographic features suggestive of chronic medical renal disease. No evidence of hydronephrosis.      Electronically signed by: RYAN GEORGE  Date:     02/20/17  Time:    01:05     Narrative:    Time of Procedure: 02/19/17 19:41:31  Accession # [id number]    Reason for study: Acute kidney injury     Comparison: None    Technique: Routine renal ultrasound was performed.    Findings:   Please note examination is technically limited secondary to patient body habitus and inability to breath-hold.    The kidneys measure 11.0 cm on the right and 10.3 cm on the left. There is bilateral cortical thinning, decreased corticomedullary distinction, and increased cortical echogenicity. There is no evidence of hydronephrosis. There are small bilateral simple appearing renal cysts.Perfusion to the kidneys is decreased. Resistive indices are elevated and as follow: 0.80 on the right and 0.86 on the left. The urinary bladder is decompressed around a Marcial catheter.            X-Ray Chest AP Portable (Final result) Result time:  02/18/17 01:45:09    Final result by Syd Kingsley MD (02/18/17 01:45:09)    Impression:       Enteric tube tip  below left hemidiaphragm in appropriate position.    Cardiomegaly with interval increased attenuation of the pulmonary parenchyma and associated stable small bilateral pleural effusions.  The findings are suggestive of probable worsening CHF.  Clinical correlation and followup are suggested.    Nonspecific lucency in the right cardiophrenic angle.                  Electronically signed by: GAYATHRI MEDINA MD  Date:     02/18/17  Time:    01:45     Narrative:    Exam: [id number]  02/18/17  01:06:46 [id number] ([facility]) : XR CHEST AP PORTABLE    Technique:    Single frontal chest x-ray.    Comparison:    02/16/2017.    Findings:      Enteric tube courses below the left hemidiaphragm with its tip in the right upper abdominal quadrant in the region of the gastric antrum.  Previous monitoring EKG leads have been removed.      The trachea is unremarkable.There is stable enlargement of the cardiomediastinal silhouette.  There is no evidence of free air beneath the hemidiaphragms.  There is stable obscuration of both costophrenic angles.  There is no evidence of a pneumothorax.  There is nonspecific lucency in the right cardiophrenic angle.   There is increased attenuation of the pulmonary parenchyma.  There are degenerative changes in the osseous structures.            X-Ray Chest 1 View (Final result) Result time:  02/16/17 09:18:52    Final result by Parker Padron Jr., MD (02/16/17 09:18:52)    Narrative:    Chest single view compared to February 14.  NG tube directed toward the gastric antrum.  Increased opacification at the bases likely some pleural fluid left greater than right.  Heart remains enlarged.  No pneumothorax.    Impression NG tube in satisfactory position.      Electronically signed by: PARKER PADRON MD  Date:     02/16/17  Time:    09:18             X-Ray Chest AP Portable (Final result) Result time:  02/14/17 16:53:12    Final result by Anson Garcia DO (02/14/17 16:53:12)    Impression:        See Above        Electronically signed by: MICHELLE MONREAL DO  Date:     02/14/17  Time:    16:53     Narrative:    Single portable frontal view of the chest    Comparison: None    Results: Ill-defined right basilar opacity concerning for effusion with atelectasis. There is no large pneumothorax. Atherosclerotic aorta. Nasogastric tube identified with looping configuration the epigastric region may be in the gastric cardia or distal esophagus. Clinical correlation and advancement advised. No large lung consolidation.            MRI Brain Without Contrast (Final result) Result time:  02/13/17 21:01:31    Final result by Jose G Amor MD (02/13/17 21:01:31)    Impression:        Occlusion of the left superior division of the M2 segment of the MCA with resulting large left cerebral infarct involving the left parietal, temporal, and frontal cortices.      Electronically signed by: JOSE G AMOR MD  Date:     02/13/17  Time:    21:01     Narrative:    MRI brain, MRA head,     02/13/17 19:50:00    Accession# [id number]      CLINICAL INDICATION: 77 year old M with stroke     TECHNIQUE: Multiplanar multisequence MR imaging of the brain was performed without intravenous contrast.  Examination performed in conjunction with a 3-D time-of-flight noncontrast MR angiogram of the intracranial vasculature.  Multiple MIP reconstructions were performed.    COMPARISON: No priors.    FINDINGS:  There is a large infarct of the left parietal lobe extending anteriorly to involve the very posterior sulci of the left frontal and temporal lobes. Otherwise, there are a few scattered T2 hyperintensities in the periventricular white matter. Possible remote infarct of the right centrum semi-ovale. The no intracranial blood products. No significant mass effect. No midline shift. Visualized portions of the calvarium and sinuses show no focal abnormality.    MRA:   Visualized portions of the carotid arteries and vertebral arteries are normal in course and  caliber. Basilar artery is widely patent. Posterior circulation is intact. Bilateral posterior communicating arteries are intact.    There is mild irregular stenoses of the bilateral MCA. There is occlusion of the superior branch of the M2 segments of the MCA.            MRA Brain (Final result) Result time:  02/13/17 21:01:31    Final result by Jose G Amor MD (02/13/17 21:01:31)    Impression:        Occlusion of the left superior division of the M2 segment of the MCA with resulting large left cerebral infarct involving the left parietal, temporal, and frontal cortices.      Electronically signed by: JOSE G AMOR MD  Date:     02/13/17  Time:    21:01     Narrative:    MRI brain, MRA head,     02/13/17 19:50:00    Accession# [id number]      CLINICAL INDICATION: 77 year old M with stroke     TECHNIQUE: Multiplanar multisequence MR imaging of the brain was performed without intravenous contrast.  Examination performed in conjunction with a 3-D time-of-flight noncontrast MR angiogram of the intracranial vasculature.  Multiple MIP reconstructions were performed.    COMPARISON: No priors.    FINDINGS:  There is a large infarct of the left parietal lobe extending anteriorly to involve the very posterior sulci of the left frontal and temporal lobes. Otherwise, there are a few scattered T2 hyperintensities in the periventricular white matter. Possible remote infarct of the right centrum semi-ovale. The no intracranial blood products. No significant mass effect. No midline shift. Visualized portions of the calvarium and sinuses show no focal abnormality.    MRA:   Visualized portions of the carotid arteries and vertebral arteries are normal in course and caliber. Basilar artery is widely patent. Posterior circulation is intact. Bilateral posterior communicating arteries are intact.    There is mild irregular stenoses of the bilateral MCA. There is occlusion of the superior branch of the M2 segments of the MCA.             X-Ray Abdomen AP 1 View (Final result) Result time:  02/13/17 17:39:18    Final result by Rosy Hicks MD (02/13/17 17:39:18)    Impression:     As above.      Electronically signed by: ROSY HICKS MD, MD  Date:     02/13/17  Time:    17:39     Narrative:    Comparison: Abdominal radiograph earlier same day at 1610 hrs.    Findings: Single AP portable semiupright view of the mid to upper abdomen which includes the lower chest. The lower abdomen and pelvis are not included in field-of-view. Enteric tube appears to have been retracted with port and tip projected over the medial left upper quadrant, likely within the proximal stomach. Otherwise, no significant interval change of the included lower chest and upper abdomen.            X-Ray Abdomen AP 1 View (Final result) Result time:  02/13/17 16:45:53    Final result by Anson Monreal DO (02/13/17 16:45:53)    Impression:     See Above .      Electronically signed by: ANSON MONREAL DO  Date:     02/13/17  Time:    16:45     Narrative:    Technique: Supine view of the upper abdomen    Comparison: 02/13/2017 at 1606    Results:Interval placement of a feeding tube with tip projected over the right upper abdomen in the region of the expected gastric pylorus/proximal duodenum. External monitors overlie the lower thorax and left mid abdomen. Continued few scattered gas-filled loops of bowel within the upper abdomen.            X-Ray Abdomen AP 1 View (Final result) Result time:  02/13/17 16:41:56    Final result by Anson Monreal DO (02/13/17 16:41:56)    Impression:     See Above .      Electronically signed by: ANSON MONREAL DO  Date:     02/13/17  Time:    16:41     Narrative:    Technique: Supine view the abdomen    Comparison: None    Results:Scattered gas-filled loops of bowel within the abdomen with gas and fecal material projected over the visualized colon. Overall nonspecific bowel gas pattern. No feeding tube projected over the visualized mediastinum  or upper abdomen. Clinical correlation advised. Multiple external leads project over the lower thorax.              Assessment and Plan:     Problem List:    Active Diagnoses:    Diagnosis Date Noted POA    PRINCIPAL PROBLEM:  Acute ischemic left MCA stroke [I63.512] 02/13/2017 Yes    Diabetic retinopathy associated with type 2 diabetes mellitus [E11.319] 02/20/2017 Yes    Acute respiratory failure with hypoxia [J96.01] 02/18/2017 Yes    Paroxysmal atrial fibrillation [I48.0] 02/14/2017 Yes    Hemiparesis, right [G81.91] 02/13/2017 Yes    Oral phase dysphagia [R13.11] 02/13/2017 Yes    Aphasia [R47.01] 02/13/2017 Yes    HTN (hypertension), malignant [I10] 02/13/2017 Yes    Iron deficiency anemia [D50.9] 02/13/2017 Yes    CKD (chronic kidney disease), stage III [N18.3] 02/13/2017 Yes    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 Atrial Fibrillation              2/14/2017: Diagnosed with paroxysmal atrial fibrillation.              [id number] (HA2S2V)              2/14/2017: Began amiodarone iv and metoprolol.              On amiodarone 200 mg Q6 and metoprolol 50 mg Q8.   2/21/2017: Into sinus rhythm.   2/22/2017: Back in atrial fibrillation.   Again in sinus.      2. Stroke Prevention              On aspirin 81 mg Q24.   Heparin 5,000 U sc Q8.              Would not favor anticoagulation at present.     3. Cerebrovascular Accident              Imaging tests reveal lacunar infarct.        VTE Risk Mitigation         Ordered     Medium Risk of VTE  Once      02/13/17 1421     Place sequential compression device  Until discontinued      02/13/17 1421          Anticipated Disposition: Long Term Care    Discharge Needs: Not clear at the present time.    Nicanor Freitas MD  Cardiology  [facility]

## 2017-02-25 NOTE — PT/OT/SLP DISCHARGE
Occupational Therapy Discharge Summary    Esteban Goins  MRN: [id number]   Acute ischemic left MCA stroke   Patient Discharged from acute Occupational Therapy on 17.  Please refer to prior OT note dated on 17 for functional status.     Assessment:   Patient appropriate for care in another setting.  GOALS:   Occupational Therapy Goals        Problem: Occupational Therapy Goal    Goal Priority Disciplines Outcome Interventions   Occupational Therapy Goal     OT, PT/OT Ongoing (interventions implemented as appropriate)    Description:  Goals to be met by: 2017    Patient will increase functional independence with ADLs by performin. Maximum Assist Supine-Sit EOB  2. Orients to visual and somatosensory quest for orientation-memory 50%  3. Mod A manual cuing during dynamic sitting balance training EOB without attempting to use LUE assist for support  4. Visual tracking to face and large object 30%                 Reasons for Discontinuation of Therapy Services  Transfer to alternate level of care.      Plan:  Patient Discharged to: Skilled Nursing Facility.    OT of record not available for documentation.    LINO Polo, 2017

## 2017-02-25 NOTE — PROGRESS NOTES
Physical Therapy Discharge Summary    Esteban Goins  MRN: [id number]   Acute ischemic left MCA stroke   Patient Discharged from acute Physical Therapy on 17.  Please refer to prior PT noted date on 17 for functional status.     Assessment:   Patient appropriate for care in another setting.  GOALS:   Physical Therapy Goals        Problem: Physical Therapy Goal    Goal Priority Disciplines Outcome Goal Variances Interventions   Physical Therapy Goal     PT/OT, PT Ongoing (interventions implemented as appropriate)     Description:  Goals to be met by: 3/14/17     Patient will increase functional independence with mobility by performin. Supine to sit with min A.  2. Sit to supine with min A.   3. Rolling R and L with min A.   4. Sitting at edge of bed >5 minutes with min A.   5. PT will assess sit<>stand.               Reasons for Discontinuation of Therapy Services  Transfer to alternate level of care.      Plan:  Patient Discharged to: Skilled Nursing Facility.

## 2017-02-26 PROBLEM — J98.9 RESPIRATORY DISEASE: Status: ACTIVE | Noted: 2017-01-01

## 2017-02-26 PROBLEM — N18.30 CKD (CHRONIC KIDNEY DISEASE), STAGE III: Chronic | Status: ACTIVE | Noted: 2017-01-01

## 2017-02-26 PROBLEM — R06.02 SHORTNESS OF BREATH: Status: ACTIVE | Noted: 2017-01-01

## 2017-02-26 PROBLEM — E87.0 HYPERNATREMIA: Status: ACTIVE | Noted: 2017-01-01

## 2017-02-26 PROBLEM — E86.0 SEVERE DEHYDRATION: Status: ACTIVE | Noted: 2017-01-01

## 2017-02-27 PROBLEM — R06.02 SHORTNESS OF BREATH: Status: ACTIVE | Noted: 2017-01-01

## 2017-02-27 PROBLEM — R73.9 HYPERGLYCEMIA: Status: ACTIVE | Noted: 2017-01-01

## 2017-02-27 PROBLEM — E87.5 HYPERKALEMIA: Status: ACTIVE | Noted: 2017-01-01

## 2017-03-01 PROBLEM — J96.01 ACUTE HYPOXEMIC RESPIRATORY FAILURE: Status: ACTIVE | Noted: 2017-01-01

## 2017-03-02 PROBLEM — J96.01 ACUTE HYPOXEMIC RESPIRATORY FAILURE: Status: ACTIVE | Noted: 2017-01-01

## 2019-06-04 NOTE — PLAN OF CARE
Problem: Patient Care Overview  Goal: Plan of Care Review  Outcome: Ongoing (interventions implemented as appropriate)  Patient free of falls, injury, or skin breakdown during shift.  Nonverbal indicators of pain absent.  Patient asleep majority of shift; arouses to pain.  Repositioned q2h with pillows. Left soft wrist restraint maintained as ordered, no signs of discomfort or injury.  Remains on BiPap.  Marcial maintained.  Tube feeds as ordered.  SCDs maintained.  Bed low, locked, side rails x 3, call bell in reach, bed alarm on.  Patient sleeping comfortably in bed.  Will continue to monitor.       none

## 2021-12-06 NOTE — PROGRESS NOTES
[facility] Hospital Medicine  Progress Note    Patient Name: Esteban Goins  MRN: [id number]  Patient Class: IP- Inpatient   Admission Date: 2/13/2017  Length of Stay: 3 days  Attending Physician: Cristy Cullen MD  Primary Care Provider: [facility]        Subjective:     Principal Problem:Acute ischemic left MCA stroke    HPI:  Mr. Goins is a 77 year old man who was found by his brother with aphasia and right hemiparesis, was brought to [facility] and found to have severe hypertension and evidence of a stroke.  He was transferred to [facility] for a higher level of care.  On initial evaluation the patient was nonverbal and unable to follow commands.  Right arm showed flaccid paralysis and right leg withdrew to painful stimuli.  Initial labs showed likely stage IV CKD with proteinuria and uncontrolled diabetes.      Hospital Course:  Patient was admitted to the ICU and was seen by the neurologist.  MRI/MRA showed an occlusion of the left superior division of a segment of the MCA resulting in a large left cerebral infarction involving the left parietal, temporal and frontal cortices.  Permissive hypertension was recommended for 24-48 hours.  He was unable to speak or swallow when evaluated by Speech and an NGT was placed.  Overnight he developed atrial fibrillation with a rapid ventricular response and was started on an amiodarone drip.  He transitioned to oral amiodarone and was transferred to the floor on 2/16.                Interval History:  HR in the 60's.  He makes eye contact but does not follow commands.  Briefly twitched his RLE when touched.  Requires frequent suctioning of thick secretions.    Review of Systems   Unable to perform ROS: Patient nonverbal     Objective:     Vital Signs (Most Recent):  Temp: 98.2 °F (36.8 °C) (02/16/17 0701)  Pulse: 60 (02/16/17 0701)  Resp: (!) 26 (02/16/17 0701)  BP: (!) 181/73 (02/16/17 0701)  SpO2:  Per [place] Pa- pt can try tramadol for pain per MD recs at rehab. Rn relayed this to the pt.  97 % (02/16/17 0701) Vital Signs (24h Range):  Temp:  [97.8 °F (36.6 °C)-98.8 °F (37.1 °C)] 98.2 °F (36.8 °C)  Pulse:  [] 60  Resp:  [19-37] 26  SpO2:  [93 %-99 %] 97 %  BP: (138-212)/() 181/73     Weight: 78.6 kg (173 lb 4.5 oz)  Body mass index is 24.86 kg/(m^2).    Intake/Output Summary (Last 24 hours) at 02/16/17 0743  Last data filed at 02/16/17 0701   Gross per 24 hour   Intake            989.4 ml   Output             1495 ml   Net           -505.6 ml      Physical Exam   Constitutional: He appears well-developed and well-nourished.   HENT:   Head: Normocephalic and atraumatic.   Edentulous.  Tongue appears dry.  Secretions evident in back of throat.   Eyes: Conjunctivae are normal. Pupils are equal, round, and reactive to light.   Squints his left eye.   Neck: Normal range of motion. Neck supple. No thyromegaly present.   Cardiovascular:   Irregularly irregular rhythm with HR in the 60's.   Pulmonary/Chest: He has no wheezes. He has no rales.   Breathing unlabored, unable to take a deep breath on command.  Coarse upper airway noise.   Abdominal: Soft. Bowel sounds are normal. He exhibits no distension. There is no tenderness.   Musculoskeletal:   Left hand deformed with only the thumb and 3rd digit remaining, heavily scarred.     Neurological: He is alert.   Paralyzed on the right.  When asked to push down with left leg he moves his toes but is unable to follow specific directions.  Slight grasp with left hand on command.   Skin: Skin is warm and dry.       Significant Labs: All pertinent labs within the past 24 hours have been reviewed.    Significant Imaging: I have reviewed all pertinent imaging results/findings within the past 24 hours.    Assessment/Plan:      * Acute ischemic left MCA stroke  - Patient found down by brother, presented outside thrombolysis window.  - MRI/MRA with large left MCA stroke.  - 2D echo with diastolic dysfunction  - Continue speech/PT/OT  - ASA/Plavix/statin, blood  pressure control  - Consult inpatient rehab      Hemiparesis, right  - Due to left MCA stroke.  - Continue therapy  - IP Rehab recommended.      Oral phase dysphagia  - Speech following and recommend NPO status with tube feeding.  - Secretions evident due to inability to handle them - check CXR today  - Consult GI for PEG - hopefully this will not be permanent and he can regain some function in rehab.    Aphasia  - Expressive and receptive.  - Patient is unable to speak or follow commands but makes eye contact.      HTN (hypertension), malignant  - Metoprolol increased to 50 mg tid  - Lisinopril 20 mg daily added yesterday - creatinine increased to 2.4 today so will back off this and add hydralazine.  - Add nifedipine 30 mg today.    Iron deficiency anemia  - Appears to have iron deficiency in addition to anemia of chronic kidney disease.  - Iron studies show low iron stores with normal TIBC      CKD (chronic kidney disease), stage III  - Chronic kidney disease stage III-IV with uncertain baseline.  - Patient on allopurinol for hyperuricemia  - Proteinuria with diabetes as contributor.  - Lisinopril held for increasing creatinine but will eventually need ACE or ARB for proteinuria.        Type 2 diabetes mellitus with diabetic chronic kidney disease  - Patient on glipizide at home - SSI while here and Levemir twice daily until bolus feeding is started.  - Increase long acting as needed  - HbA1c 9.7 and sister reports he was noncompliant with diabetes treatment.      Paroxysmal atrial fibrillation  - Patient in NSR on arrival but now in rate controlled atrial fibrillation.  - Cardiologist following and transitioning patient to oral amiodarone.    VTE Risk Mitigation         Ordered     heparin (porcine) injection 5,000 Units  Every 8 hours     Route:  Subcutaneous        02/13/17 1421     Medium Risk of VTE  Once      02/13/17 1421     Place sequential compression device  Until discontinued      02/13/17 1421           Cristy Maher MD  Department of Hospital Medicine   [facility]

## 2022-12-14 NOTE — ASSESSMENT & PLAN NOTE
- Expressive and receptive.  - Patient yawns frequently and vocalizes with moans  - Unable to speak but makes eye contact.     No